# Patient Record
Sex: MALE | Race: WHITE
[De-identification: names, ages, dates, MRNs, and addresses within clinical notes are randomized per-mention and may not be internally consistent; named-entity substitution may affect disease eponyms.]

---

## 2017-03-08 ENCOUNTER — APPOINTMENT (OUTPATIENT)
Dept: OTOLARYNGOLOGY | Facility: CLINIC | Age: 72
End: 2017-03-08

## 2017-03-13 ENCOUNTER — INPATIENT (INPATIENT)
Facility: HOSPITAL | Age: 72
LOS: 6 days | Discharge: HOME | End: 2017-03-20
Attending: INTERNAL MEDICINE | Admitting: INTERNAL MEDICINE

## 2017-04-18 ENCOUNTER — OUTPATIENT (OUTPATIENT)
Dept: OUTPATIENT SERVICES | Facility: HOSPITAL | Age: 72
LOS: 1 days | Discharge: HOME | End: 2017-04-18

## 2017-05-02 ENCOUNTER — APPOINTMENT (OUTPATIENT)
Dept: OTOLARYNGOLOGY | Facility: HOSPITAL | Age: 72
End: 2017-05-02

## 2017-05-02 ENCOUNTER — APPOINTMENT (OUTPATIENT)
Dept: OTOLARYNGOLOGY | Facility: AMBULATORY SURGERY CENTER | Age: 72
End: 2017-05-02

## 2017-05-16 ENCOUNTER — APPOINTMENT (OUTPATIENT)
Dept: OTOLARYNGOLOGY | Facility: HOSPITAL | Age: 72
End: 2017-05-16

## 2017-05-18 ENCOUNTER — APPOINTMENT (OUTPATIENT)
Dept: OTOLARYNGOLOGY | Facility: CLINIC | Age: 72
End: 2017-05-18

## 2017-05-23 ENCOUNTER — APPOINTMENT (OUTPATIENT)
Dept: OTOLARYNGOLOGY | Facility: AMBULATORY SURGERY CENTER | Age: 72
End: 2017-05-23

## 2017-05-24 ENCOUNTER — APPOINTMENT (OUTPATIENT)
Dept: OTOLARYNGOLOGY | Facility: CLINIC | Age: 72
End: 2017-05-24

## 2017-06-10 ENCOUNTER — OUTPATIENT (OUTPATIENT)
Dept: OUTPATIENT SERVICES | Facility: HOSPITAL | Age: 72
LOS: 1 days | Discharge: HOME | End: 2017-06-10

## 2017-06-10 DIAGNOSIS — I95.1 ORTHOSTATIC HYPOTENSION: ICD-10-CM

## 2017-06-10 DIAGNOSIS — G40.909 EPILEPSY, UNSPECIFIED, NOT INTRACTABLE, WITHOUT STATUS EPILEPTICUS: ICD-10-CM

## 2017-06-14 ENCOUNTER — APPOINTMENT (OUTPATIENT)
Dept: OTOLARYNGOLOGY | Facility: CLINIC | Age: 72
End: 2017-06-14

## 2017-06-28 DIAGNOSIS — E06.3 AUTOIMMUNE THYROIDITIS: ICD-10-CM

## 2017-06-28 DIAGNOSIS — E21.0 PRIMARY HYPERPARATHYROIDISM: ICD-10-CM

## 2017-07-13 ENCOUNTER — OUTPATIENT (OUTPATIENT)
Dept: OUTPATIENT SERVICES | Facility: HOSPITAL | Age: 72
LOS: 1 days | Discharge: HOME | End: 2017-07-13

## 2017-07-13 DIAGNOSIS — G40.909 EPILEPSY, UNSPECIFIED, NOT INTRACTABLE, WITHOUT STATUS EPILEPTICUS: ICD-10-CM

## 2017-07-13 DIAGNOSIS — I95.1 ORTHOSTATIC HYPOTENSION: ICD-10-CM

## 2017-07-13 DIAGNOSIS — E03.9 HYPOTHYROIDISM, UNSPECIFIED: ICD-10-CM

## 2017-07-13 DIAGNOSIS — D64.9 ANEMIA, UNSPECIFIED: ICD-10-CM

## 2017-07-13 DIAGNOSIS — E78.9 DISORDER OF LIPOPROTEIN METABOLISM, UNSPECIFIED: ICD-10-CM

## 2017-10-07 ENCOUNTER — OUTPATIENT (OUTPATIENT)
Dept: OUTPATIENT SERVICES | Facility: HOSPITAL | Age: 72
LOS: 1 days | Discharge: HOME | End: 2017-10-07

## 2017-10-07 DIAGNOSIS — Z79.899 OTHER LONG TERM (CURRENT) DRUG THERAPY: ICD-10-CM

## 2017-10-07 DIAGNOSIS — I25.10 ATHEROSCLEROTIC HEART DISEASE OF NATIVE CORONARY ARTERY WITHOUT ANGINA PECTORIS: ICD-10-CM

## 2017-10-07 DIAGNOSIS — D64.9 ANEMIA, UNSPECIFIED: ICD-10-CM

## 2017-10-07 DIAGNOSIS — G40.909 EPILEPSY, UNSPECIFIED, NOT INTRACTABLE, WITHOUT STATUS EPILEPTICUS: ICD-10-CM

## 2017-10-07 DIAGNOSIS — E11.9 TYPE 2 DIABETES MELLITUS WITHOUT COMPLICATIONS: ICD-10-CM

## 2017-10-07 DIAGNOSIS — I95.1 ORTHOSTATIC HYPOTENSION: ICD-10-CM

## 2017-10-07 DIAGNOSIS — E78.00 PURE HYPERCHOLESTEROLEMIA, UNSPECIFIED: ICD-10-CM

## 2017-12-12 DIAGNOSIS — D50.9 IRON DEFICIENCY ANEMIA, UNSPECIFIED: ICD-10-CM

## 2017-12-12 DIAGNOSIS — E66.01 MORBID (SEVERE) OBESITY DUE TO EXCESS CALORIES: ICD-10-CM

## 2017-12-12 DIAGNOSIS — I25.2 OLD MYOCARDIAL INFARCTION: ICD-10-CM

## 2017-12-12 DIAGNOSIS — Z95.5 PRESENCE OF CORONARY ANGIOPLASTY IMPLANT AND GRAFT: ICD-10-CM

## 2017-12-12 DIAGNOSIS — I10 ESSENTIAL (PRIMARY) HYPERTENSION: ICD-10-CM

## 2017-12-12 DIAGNOSIS — N40.0 BENIGN PROSTATIC HYPERPLASIA WITHOUT LOWER URINARY TRACT SYMPTOMS: ICD-10-CM

## 2017-12-12 DIAGNOSIS — G40.909 EPILEPSY, UNSPECIFIED, NOT INTRACTABLE, WITHOUT STATUS EPILEPTICUS: ICD-10-CM

## 2017-12-12 DIAGNOSIS — E11.9 TYPE 2 DIABETES MELLITUS WITHOUT COMPLICATIONS: ICD-10-CM

## 2017-12-12 DIAGNOSIS — Z79.84 LONG TERM (CURRENT) USE OF ORAL HYPOGLYCEMIC DRUGS: ICD-10-CM

## 2017-12-12 DIAGNOSIS — I25.10 ATHEROSCLEROTIC HEART DISEASE OF NATIVE CORONARY ARTERY WITHOUT ANGINA PECTORIS: ICD-10-CM

## 2017-12-12 DIAGNOSIS — G89.29 OTHER CHRONIC PAIN: ICD-10-CM

## 2017-12-12 DIAGNOSIS — K21.9 GASTRO-ESOPHAGEAL REFLUX DISEASE WITHOUT ESOPHAGITIS: ICD-10-CM

## 2017-12-12 DIAGNOSIS — G47.33 OBSTRUCTIVE SLEEP APNEA (ADULT) (PEDIATRIC): ICD-10-CM

## 2017-12-12 DIAGNOSIS — M54.9 DORSALGIA, UNSPECIFIED: ICD-10-CM

## 2017-12-12 DIAGNOSIS — Z86.718 PERSONAL HISTORY OF OTHER VENOUS THROMBOSIS AND EMBOLISM: ICD-10-CM

## 2017-12-12 DIAGNOSIS — E21.3 HYPERPARATHYROIDISM, UNSPECIFIED: ICD-10-CM

## 2017-12-12 DIAGNOSIS — E78.5 HYPERLIPIDEMIA, UNSPECIFIED: ICD-10-CM

## 2017-12-12 DIAGNOSIS — M25.512 PAIN IN LEFT SHOULDER: ICD-10-CM

## 2017-12-12 DIAGNOSIS — E03.9 HYPOTHYROIDISM, UNSPECIFIED: ICD-10-CM

## 2017-12-12 DIAGNOSIS — M25.511 PAIN IN RIGHT SHOULDER: ICD-10-CM

## 2017-12-27 ENCOUNTER — OUTPATIENT (OUTPATIENT)
Dept: OUTPATIENT SERVICES | Facility: HOSPITAL | Age: 72
LOS: 1 days | Discharge: HOME | End: 2017-12-27

## 2017-12-27 DIAGNOSIS — E11.9 TYPE 2 DIABETES MELLITUS WITHOUT COMPLICATIONS: ICD-10-CM

## 2017-12-27 DIAGNOSIS — I10 ESSENTIAL (PRIMARY) HYPERTENSION: ICD-10-CM

## 2017-12-27 DIAGNOSIS — G40.909 EPILEPSY, UNSPECIFIED, NOT INTRACTABLE, WITHOUT STATUS EPILEPTICUS: ICD-10-CM

## 2017-12-27 DIAGNOSIS — E78.5 HYPERLIPIDEMIA, UNSPECIFIED: ICD-10-CM

## 2017-12-27 DIAGNOSIS — I95.1 ORTHOSTATIC HYPOTENSION: ICD-10-CM

## 2018-01-18 ENCOUNTER — OUTPATIENT (OUTPATIENT)
Dept: OUTPATIENT SERVICES | Facility: HOSPITAL | Age: 73
LOS: 1 days | Discharge: HOME | End: 2018-01-18

## 2018-01-18 DIAGNOSIS — I95.1 ORTHOSTATIC HYPOTENSION: ICD-10-CM

## 2018-01-18 DIAGNOSIS — G40.909 EPILEPSY, UNSPECIFIED, NOT INTRACTABLE, WITHOUT STATUS EPILEPTICUS: ICD-10-CM

## 2018-01-23 DIAGNOSIS — H25.89 OTHER AGE-RELATED CATARACT: ICD-10-CM

## 2018-01-23 DIAGNOSIS — E11.9 TYPE 2 DIABETES MELLITUS WITHOUT COMPLICATIONS: ICD-10-CM

## 2018-01-23 DIAGNOSIS — J44.9 CHRONIC OBSTRUCTIVE PULMONARY DISEASE, UNSPECIFIED: ICD-10-CM

## 2018-01-23 DIAGNOSIS — E66.9 OBESITY, UNSPECIFIED: ICD-10-CM

## 2018-01-25 ENCOUNTER — OUTPATIENT (OUTPATIENT)
Dept: OUTPATIENT SERVICES | Facility: HOSPITAL | Age: 73
LOS: 1 days | Discharge: HOME | End: 2018-01-25

## 2018-02-04 DIAGNOSIS — G40.909 EPILEPSY, UNSPECIFIED, NOT INTRACTABLE, WITHOUT STATUS EPILEPTICUS: ICD-10-CM

## 2018-02-04 DIAGNOSIS — I95.1 ORTHOSTATIC HYPOTENSION: ICD-10-CM

## 2018-02-09 DIAGNOSIS — H25.89 OTHER AGE-RELATED CATARACT: ICD-10-CM

## 2018-02-09 DIAGNOSIS — E11.9 TYPE 2 DIABETES MELLITUS WITHOUT COMPLICATIONS: ICD-10-CM

## 2018-02-09 DIAGNOSIS — R56.9 UNSPECIFIED CONVULSIONS: ICD-10-CM

## 2018-02-09 DIAGNOSIS — I25.2 OLD MYOCARDIAL INFARCTION: ICD-10-CM

## 2018-02-09 DIAGNOSIS — I10 ESSENTIAL (PRIMARY) HYPERTENSION: ICD-10-CM

## 2018-02-09 DIAGNOSIS — E78.5 HYPERLIPIDEMIA, UNSPECIFIED: ICD-10-CM

## 2018-02-09 DIAGNOSIS — E66.01 MORBID (SEVERE) OBESITY DUE TO EXCESS CALORIES: ICD-10-CM

## 2018-02-09 DIAGNOSIS — K21.9 GASTRO-ESOPHAGEAL REFLUX DISEASE WITHOUT ESOPHAGITIS: ICD-10-CM

## 2018-02-09 DIAGNOSIS — Z95.5 PRESENCE OF CORONARY ANGIOPLASTY IMPLANT AND GRAFT: ICD-10-CM

## 2018-02-21 DIAGNOSIS — Z01.818 ENCOUNTER FOR OTHER PREPROCEDURAL EXAMINATION: ICD-10-CM

## 2018-02-21 DIAGNOSIS — E21.0 PRIMARY HYPERPARATHYROIDISM: ICD-10-CM

## 2018-03-30 ENCOUNTER — OUTPATIENT (OUTPATIENT)
Dept: OUTPATIENT SERVICES | Facility: HOSPITAL | Age: 73
LOS: 1 days | Discharge: HOME | End: 2018-03-30

## 2018-03-30 DIAGNOSIS — I10 ESSENTIAL (PRIMARY) HYPERTENSION: ICD-10-CM

## 2018-03-30 DIAGNOSIS — E78.5 HYPERLIPIDEMIA, UNSPECIFIED: ICD-10-CM

## 2018-03-30 DIAGNOSIS — E11.9 TYPE 2 DIABETES MELLITUS WITHOUT COMPLICATIONS: ICD-10-CM

## 2018-04-23 ENCOUNTER — EMERGENCY (EMERGENCY)
Facility: HOSPITAL | Age: 73
LOS: 0 days | Discharge: HOME | End: 2018-04-23
Attending: EMERGENCY MEDICINE | Admitting: EMERGENCY MEDICINE

## 2018-04-23 VITALS
SYSTOLIC BLOOD PRESSURE: 154 MMHG | HEIGHT: 71 IN | DIASTOLIC BLOOD PRESSURE: 84 MMHG | OXYGEN SATURATION: 98 % | HEART RATE: 67 BPM | TEMPERATURE: 98 F | RESPIRATION RATE: 18 BRPM | WEIGHT: 279.99 LBS

## 2018-04-23 DIAGNOSIS — I82.432 ACUTE EMBOLISM AND THROMBOSIS OF LEFT POPLITEAL VEIN: ICD-10-CM

## 2018-04-23 DIAGNOSIS — I10 ESSENTIAL (PRIMARY) HYPERTENSION: ICD-10-CM

## 2018-04-23 DIAGNOSIS — E11.9 TYPE 2 DIABETES MELLITUS WITHOUT COMPLICATIONS: ICD-10-CM

## 2018-04-23 DIAGNOSIS — M25.569 PAIN IN UNSPECIFIED KNEE: ICD-10-CM

## 2018-04-23 RX ORDER — RIVAROXABAN 15 MG-20MG
1 KIT ORAL
Qty: 1 | Refills: 0 | OUTPATIENT
Start: 2018-04-23 | End: 2018-05-22

## 2018-04-23 NOTE — ED PROVIDER NOTE - PHYSICAL EXAMINATION
--EXAM--  VITAL SIGNS: I have reviewed vs documented at present.  CONSTITUTIONAL: Well-developed; well-nourished; in no acute distress.   SKIN: Warm and dry, no acute rash.   HEAD: Normocephalic; atraumatic.  EYES: PERRL, EOM intact; conjunctiva and sclera clear. No nystagmus.  ENT: No nasal discharge; airway clear.  NECK: Supple; non tender.CARD: S1, S2, Regular rate and rhythm.   RESP: No wheezes, rales or rhonchi.    EXT: Normal ROM. left knee no tenderness no swelling no eccymosis full rom   NEURO: Alert, oriented, grossly unremarkable. Strength 5/5 in all extremities. Sensation intact throughout. --EXAM--  VITAL SIGNS: I have reviewed vs documented at present.  CONSTITUTIONAL: Well-developed; well-nourished; in no acute distress.   SKIN: Warm and dry, no acute rash.   HEAD: Normocephalic; atraumatic.  EYES: PERRL, EOM intact; conjunctiva and sclera clear. No nystagmus.  ENT: No nasal discharge; airway clear.  NECK: Supple; non tender.CARD: S1, S2, Regular rate and rhythm.   RESP: No wheezes, rales or rhonchi.  EXT: Normal ROM. left knee no tenderness no swelling no eccymosis full rom   NEURO: Alert, oriented, grossly unremarkable. Strength 5/5 in all extremities. Sensation intact throughout.

## 2018-04-23 NOTE — ED PROVIDER NOTE - NS ED ROS FT
Review of Systems:  	•	CONSTITUTIONAL - no fever, no diaphoresis, no chills  	•	SKIN - no rash  	•	HEMATOLOGIC - no bleeding, no bruising    	•	ENT - no change in hearing, no sore throat, no ear pain or tinnitus  	•	RESPIRATORY - no shortness of breath, no cough  	•	CARDIAC - no chest pain, no palpitations  	  	•	MUSCULOSKELETAL -left knee pain , no swelling, no redness  	•	NEUROLOGIC - no weakness, no headache, no paresthesias, no LOC Review of Systems:  	•	CONSTITUTIONAL - no fever, no diaphoresis, no chills  	•	SKIN - no rash  	•	HEMATOLOGIC - no bleeding, no bruising  	•	ENT - no change in hearing, no sore throat, no ear pain or tinnitus  	•	RESPIRATORY - no shortness of breath, no cough  	•	CARDIAC - no chest pain, no palpitations  	•	MUSCULOSKELETAL -left knee pain , no swelling, no redness  	•	NEUROLOGIC - no weakness, no headache, no paresthesias, no LOC

## 2018-04-23 NOTE — ED PROVIDER NOTE - PROGRESS NOTE DETAILS
Pt aware of dvt.  Denies rectal bleeding or other rf.  Pt wishes case d/w his cardiologist Dr. Rizzo prior to anticoagulation. Unable to d/w pts cardiologist.  Pt states he will take xarelto and hold his plavix today.  He states he will call his cardiologist tomorrow to discuss further treatment.  I discussed with patient treatment, need to follow-up as well as reasons to return and they agree.

## 2018-04-23 NOTE — ED PROVIDER NOTE - OBJECTIVE STATEMENT
this is 71 yo male presents to ed for evaluation of knee pain . patient states that pain is going on for past couple of days. patient denies any injury to knee. patient states pain is in the back of his knee. patient states that about 5 years ago he had blood clot in that leg. patient admits that he did have injury at that time. patient states that his feel the same . patient states the only difference is no injury. patient is able to ambulate with no problem

## 2018-04-27 ENCOUNTER — EMERGENCY (EMERGENCY)
Facility: HOSPITAL | Age: 73
LOS: 0 days | Discharge: HOME | End: 2018-04-27
Attending: EMERGENCY MEDICINE | Admitting: EMERGENCY MEDICINE

## 2018-04-27 VITALS
TEMPERATURE: 97 F | OXYGEN SATURATION: 98 % | HEART RATE: 69 BPM | DIASTOLIC BLOOD PRESSURE: 79 MMHG | SYSTOLIC BLOOD PRESSURE: 158 MMHG | RESPIRATION RATE: 18 BRPM

## 2018-04-27 VITALS
DIASTOLIC BLOOD PRESSURE: 97 MMHG | HEIGHT: 71 IN | TEMPERATURE: 97 F | SYSTOLIC BLOOD PRESSURE: 163 MMHG | OXYGEN SATURATION: 100 % | WEIGHT: 184.97 LBS | HEART RATE: 80 BPM | RESPIRATION RATE: 18 BRPM

## 2018-04-27 DIAGNOSIS — Z79.899 OTHER LONG TERM (CURRENT) DRUG THERAPY: ICD-10-CM

## 2018-04-27 DIAGNOSIS — E11.9 TYPE 2 DIABETES MELLITUS WITHOUT COMPLICATIONS: ICD-10-CM

## 2018-04-27 DIAGNOSIS — I82.402 ACUTE EMBOLISM AND THROMBOSIS OF UNSPECIFIED DEEP VEINS OF LEFT LOWER EXTREMITY: ICD-10-CM

## 2018-04-27 DIAGNOSIS — Z79.02 LONG TERM (CURRENT) USE OF ANTITHROMBOTICS/ANTIPLATELETS: ICD-10-CM

## 2018-04-27 DIAGNOSIS — Z79.01 LONG TERM (CURRENT) USE OF ANTICOAGULANTS: ICD-10-CM

## 2018-04-27 DIAGNOSIS — Z79.2 LONG TERM (CURRENT) USE OF ANTIBIOTICS: ICD-10-CM

## 2018-04-27 DIAGNOSIS — Z79.811 LONG TERM (CURRENT) USE OF AROMATASE INHIBITORS: ICD-10-CM

## 2018-04-27 DIAGNOSIS — I10 ESSENTIAL (PRIMARY) HYPERTENSION: ICD-10-CM

## 2018-04-27 LAB
ALBUMIN SERPL ELPH-MCNC: 4.6 G/DL — SIGNIFICANT CHANGE UP (ref 3.5–5.2)
ALP SERPL-CCNC: 57 U/L — SIGNIFICANT CHANGE UP (ref 30–115)
ALT FLD-CCNC: 33 U/L — SIGNIFICANT CHANGE UP (ref 0–41)
ANION GAP SERPL CALC-SCNC: 14 MMOL/L — SIGNIFICANT CHANGE UP (ref 7–14)
AST SERPL-CCNC: 22 U/L — SIGNIFICANT CHANGE UP (ref 0–41)
BILIRUB SERPL-MCNC: 0.3 MG/DL — SIGNIFICANT CHANGE UP (ref 0.2–1.2)
BUN SERPL-MCNC: 20 MG/DL — SIGNIFICANT CHANGE UP (ref 10–20)
CALCIUM SERPL-MCNC: 9.3 MG/DL — SIGNIFICANT CHANGE UP (ref 8.5–10.1)
CHLORIDE SERPL-SCNC: 103 MMOL/L — SIGNIFICANT CHANGE UP (ref 98–110)
CO2 SERPL-SCNC: 24 MMOL/L — SIGNIFICANT CHANGE UP (ref 17–32)
CREAT SERPL-MCNC: 0.8 MG/DL — SIGNIFICANT CHANGE UP (ref 0.7–1.5)
GLUCOSE SERPL-MCNC: 127 MG/DL — HIGH (ref 70–99)
HCT VFR BLD CALC: 38.7 % — LOW (ref 42–52)
HGB BLD-MCNC: 13.3 G/DL — LOW (ref 14–18)
MCHC RBC-ENTMCNC: 30.2 PG — SIGNIFICANT CHANGE UP (ref 27–31)
MCHC RBC-ENTMCNC: 34.4 G/DL — SIGNIFICANT CHANGE UP (ref 32–37)
MCV RBC AUTO: 88 FL — SIGNIFICANT CHANGE UP (ref 80–94)
NRBC # BLD: 0 /100 WBCS — SIGNIFICANT CHANGE UP (ref 0–0)
PLATELET # BLD AUTO: 131 K/UL — SIGNIFICANT CHANGE UP (ref 130–400)
POTASSIUM SERPL-MCNC: 4.2 MMOL/L — SIGNIFICANT CHANGE UP (ref 3.5–5)
POTASSIUM SERPL-SCNC: 4.2 MMOL/L — SIGNIFICANT CHANGE UP (ref 3.5–5)
PROT SERPL-MCNC: 6.7 G/DL — SIGNIFICANT CHANGE UP (ref 6–8)
RBC # BLD: 4.4 M/UL — LOW (ref 4.7–6.1)
RBC # FLD: 13.2 % — SIGNIFICANT CHANGE UP (ref 11.5–14.5)
SODIUM SERPL-SCNC: 141 MMOL/L — SIGNIFICANT CHANGE UP (ref 135–146)
WBC # BLD: 5.01 K/UL — SIGNIFICANT CHANGE UP (ref 4.8–10.8)
WBC # FLD AUTO: 5.01 K/UL — SIGNIFICANT CHANGE UP (ref 4.8–10.8)

## 2018-04-27 NOTE — ED PROVIDER NOTE - PHYSICAL EXAMINATION
Alert, NAD, WDWN, well-appearing  PERRL, EOMI, normal pupils, no icterus, normal external ENT, pink/moist membranes  Airway intact, Lungs CTAB, no wheezing or rhonchi, normal resp effort w/o tachypnea, no retractions  CVS1S2, RRR, no m/g/r, 2+ pulses b/l, warm/well-perfused  Abdomen soft, nondistended, no tenderness on palpation to all 4 quadrants  FROM all 4 ext, no bony tenderness or obvious deformities, no LE edema, left leg tenderness  Skin warm/dry, no acute rash

## 2018-04-27 NOTE — ED PROVIDER NOTE - ATTENDING CONTRIBUTION TO CARE
I personally evaluated the patient. I reviewed the Resident’s or Physician Assistant’s note (as assigned above), and agree with the findings and plan except as documented in my note.  Pt instructed in proper dosing/frequency

## 2018-04-27 NOTE — ED PROVIDER NOTE - NS ED ROS FT
Review of Systems:  	•	CONSTITUTIONAL - no fever, no diaphoresis, no chills  	•	SKIN - no rash  	•	RESPIRATORY - no shortness of breath, no cough  	•	CARDIAC - no chest pain, no palpitations  	•	GI - no abd pain, no nausea, no vomiting, no diarrhea  	•	MUSCULOSKELETAL - +left leg pain

## 2018-04-27 NOTE — ED PROVIDER NOTE - OBJECTIVE STATEMENT
72 yr old male, presenting with concern for taking his Xarelto, was just prescribed 4 days ago for newly discovered left popliteal DVT, started taking it 2 days ago, states he got discharge instructions to take 15 mg PO daily for first 3 weeks, however read package insert only which stated for him to take 15 mg PO BID for 3 weeks and has been doing so for the last 2 days, denies any active bleeding or bruising since taking medications. Has appointment on May 12 for vascular surg

## 2018-05-11 ENCOUNTER — APPOINTMENT (OUTPATIENT)
Dept: VASCULAR SURGERY | Facility: CLINIC | Age: 73
End: 2018-05-11
Payer: MEDICARE

## 2018-05-11 VITALS
BODY MASS INDEX: 39.2 KG/M2 | HEIGHT: 71 IN | DIASTOLIC BLOOD PRESSURE: 90 MMHG | WEIGHT: 280 LBS | SYSTOLIC BLOOD PRESSURE: 142 MMHG

## 2018-05-11 DIAGNOSIS — Z78.9 OTHER SPECIFIED HEALTH STATUS: ICD-10-CM

## 2018-05-11 DIAGNOSIS — K22.70 BARRETT'S ESOPHAGUS W/OUT DYSPLASIA: ICD-10-CM

## 2018-05-11 DIAGNOSIS — E03.9 HYPOTHYROIDISM, UNSPECIFIED: ICD-10-CM

## 2018-05-11 PROCEDURE — 99203 OFFICE O/P NEW LOW 30 MIN: CPT

## 2018-05-11 PROCEDURE — 93971 EXTREMITY STUDY: CPT

## 2018-06-29 ENCOUNTER — APPOINTMENT (OUTPATIENT)
Dept: VASCULAR SURGERY | Facility: CLINIC | Age: 73
End: 2018-06-29
Payer: MEDICARE

## 2018-06-29 PROCEDURE — 99213 OFFICE O/P EST LOW 20 MIN: CPT

## 2018-06-29 PROCEDURE — 93971 EXTREMITY STUDY: CPT

## 2018-07-07 ENCOUNTER — OUTPATIENT (OUTPATIENT)
Dept: OUTPATIENT SERVICES | Facility: HOSPITAL | Age: 73
LOS: 1 days | Discharge: HOME | End: 2018-07-07

## 2018-07-07 DIAGNOSIS — N19 UNSPECIFIED KIDNEY FAILURE: ICD-10-CM

## 2018-07-07 DIAGNOSIS — E11.9 TYPE 2 DIABETES MELLITUS WITHOUT COMPLICATIONS: ICD-10-CM

## 2018-07-07 DIAGNOSIS — E03.9 HYPOTHYROIDISM, UNSPECIFIED: ICD-10-CM

## 2018-07-07 DIAGNOSIS — E78.2 MIXED HYPERLIPIDEMIA: ICD-10-CM

## 2018-07-31 PROBLEM — I10 ESSENTIAL (PRIMARY) HYPERTENSION: Chronic | Status: ACTIVE | Noted: 2018-04-23

## 2018-08-14 ENCOUNTER — OUTPATIENT (OUTPATIENT)
Dept: OUTPATIENT SERVICES | Facility: HOSPITAL | Age: 73
LOS: 1 days | Discharge: HOME | End: 2018-08-14

## 2018-08-14 VITALS
HEIGHT: 71 IN | WEIGHT: 270.07 LBS | DIASTOLIC BLOOD PRESSURE: 94 MMHG | OXYGEN SATURATION: 96 % | RESPIRATION RATE: 16 BRPM | HEART RATE: 76 BPM | SYSTOLIC BLOOD PRESSURE: 158 MMHG | TEMPERATURE: 98 F

## 2018-08-14 DIAGNOSIS — Z01.818 ENCOUNTER FOR OTHER PREPROCEDURAL EXAMINATION: ICD-10-CM

## 2018-08-14 DIAGNOSIS — Z98.890 OTHER SPECIFIED POSTPROCEDURAL STATES: Chronic | ICD-10-CM

## 2018-08-14 DIAGNOSIS — M75.122 COMPLETE ROTATOR CUFF TEAR OR RUPTURE OF LEFT SHOULDER, NOT SPECIFIED AS TRAUMATIC: ICD-10-CM

## 2018-08-14 RX ORDER — LOSARTAN POTASSIUM 100 MG/1
0 TABLET, FILM COATED ORAL
Qty: 0 | Refills: 0 | COMMUNITY

## 2018-08-14 RX ORDER — OMEPRAZOLE 10 MG/1
0 CAPSULE, DELAYED RELEASE ORAL
Qty: 0 | Refills: 0 | COMMUNITY

## 2018-08-14 NOTE — H&P PST ADULT - HISTORY OF PRESENT ILLNESS
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 Denies c/o CP ,PALP, SOB, URI , FEVER, RASH OR UTI SYMPTOMS. Exercise anjana 2 FOS

## 2018-08-27 NOTE — ASU PATIENT PROFILE, ADULT - PMH
CAD (coronary artery disease)  STENTS 2000 AND 2002  Cardiac abnormality    Diabetes    DVT (deep venous thrombosis)  LEFT LE- 2008 AND 2018 AFTER INJURY  GERD (gastroesophageal reflux disease)    Hypertension complications    ETELVINA (obstructive sleep apnea)  NOT USING cpap

## 2018-08-28 ENCOUNTER — OUTPATIENT (OUTPATIENT)
Dept: OUTPATIENT SERVICES | Facility: HOSPITAL | Age: 73
LOS: 1 days | Discharge: HOME | End: 2018-08-28

## 2018-08-28 ENCOUNTER — RESULT REVIEW (OUTPATIENT)
Age: 73
End: 2018-08-28

## 2018-08-28 VITALS
OXYGEN SATURATION: 95 % | SYSTOLIC BLOOD PRESSURE: 140 MMHG | DIASTOLIC BLOOD PRESSURE: 69 MMHG | RESPIRATION RATE: 19 BRPM | HEART RATE: 80 BPM

## 2018-08-28 VITALS
WEIGHT: 270.07 LBS | HEIGHT: 71 IN | OXYGEN SATURATION: 97 % | TEMPERATURE: 99 F | SYSTOLIC BLOOD PRESSURE: 187 MMHG | HEART RATE: 77 BPM | RESPIRATION RATE: 18 BRPM | DIASTOLIC BLOOD PRESSURE: 89 MMHG

## 2018-08-28 DIAGNOSIS — Z98.890 OTHER SPECIFIED POSTPROCEDURAL STATES: Chronic | ICD-10-CM

## 2018-08-28 RX ORDER — SODIUM CHLORIDE 9 MG/ML
1000 INJECTION, SOLUTION INTRAVENOUS
Qty: 0 | Refills: 0 | Status: DISCONTINUED | OUTPATIENT
Start: 2018-08-28 | End: 2018-09-12

## 2018-08-28 RX ORDER — ONDANSETRON 8 MG/1
4 TABLET, FILM COATED ORAL ONCE
Qty: 0 | Refills: 0 | Status: DISCONTINUED | OUTPATIENT
Start: 2018-08-28 | End: 2018-09-12

## 2018-08-28 RX ORDER — OXYCODONE AND ACETAMINOPHEN 5; 325 MG/1; MG/1
1 TABLET ORAL ONCE
Qty: 0 | Refills: 0 | Status: DISCONTINUED | OUTPATIENT
Start: 2018-08-28 | End: 2018-08-28

## 2018-08-28 RX ORDER — HYDROMORPHONE HYDROCHLORIDE 2 MG/ML
0.5 INJECTION INTRAMUSCULAR; INTRAVENOUS; SUBCUTANEOUS
Qty: 0 | Refills: 0 | Status: DISCONTINUED | OUTPATIENT
Start: 2018-08-28 | End: 2018-08-28

## 2018-08-28 RX ADMIN — OXYCODONE AND ACETAMINOPHEN 1 TABLET(S): 5; 325 TABLET ORAL at 13:35

## 2018-08-28 NOTE — BRIEF OPERATIVE NOTE - PROCEDURE
<<-----Click on this checkbox to enter Procedure Decompression, subacromial space, shoulder, arthroscopic  08/28/2018    Active  YOHANA  Rotator cuff repair  08/28/2018    Active  YOHANA

## 2018-08-29 LAB
GLUCOSE BLDC GLUCOMTR-MCNC: 105 MG/DL — HIGH (ref 70–99)
SURGICAL PATHOLOGY STUDY: SIGNIFICANT CHANGE UP

## 2018-09-03 DIAGNOSIS — S46.012A STRAIN OF MUSCLE(S) AND TENDON(S) OF THE ROTATOR CUFF OF LEFT SHOULDER, INITIAL ENCOUNTER: ICD-10-CM

## 2018-09-03 DIAGNOSIS — I25.10 ATHEROSCLEROTIC HEART DISEASE OF NATIVE CORONARY ARTERY WITHOUT ANGINA PECTORIS: ICD-10-CM

## 2018-09-03 DIAGNOSIS — I10 ESSENTIAL (PRIMARY) HYPERTENSION: ICD-10-CM

## 2018-09-03 DIAGNOSIS — K21.9 GASTRO-ESOPHAGEAL REFLUX DISEASE WITHOUT ESOPHAGITIS: ICD-10-CM

## 2018-09-03 DIAGNOSIS — S43.432A SUPERIOR GLENOID LABRUM LESION OF LEFT SHOULDER, INITIAL ENCOUNTER: ICD-10-CM
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
## 2018-09-03 DIAGNOSIS — Z79.01 LONG TERM (CURRENT) USE OF ANTICOAGULANTS: ICD-10-CM
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
## 2018-09-03 DIAGNOSIS — Z86.718 PERSONAL HISTORY OF OTHER VENOUS THROMBOSIS AND EMBOLISM: ICD-10-CM

## 2018-09-03 DIAGNOSIS — M75.42 IMPINGEMENT SYNDROME OF LEFT SHOULDER: ICD-10-CM

## 2018-09-03 DIAGNOSIS — Z95.5 PRESENCE OF CORONARY ANGIOPLASTY IMPLANT AND GRAFT: ICD-10-CM

## 2018-09-03 DIAGNOSIS — E11.9 TYPE 2 DIABETES MELLITUS WITHOUT COMPLICATIONS: ICD-10-CM

## 2018-09-03 DIAGNOSIS — Z88.1 ALLERGY STATUS TO OTHER ANTIBIOTIC AGENTS STATUS: ICD-10-CM

## 2018-09-03 DIAGNOSIS — Z79.02 LONG TERM (CURRENT) USE OF ANTITHROMBOTICS/ANTIPLATELETS: ICD-10-CM

## 2018-09-03 DIAGNOSIS — Z79.84 LONG TERM (CURRENT) USE OF ORAL HYPOGLYCEMIC DRUGS: ICD-10-CM

## 2018-09-03 DIAGNOSIS — M65.9 SYNOVITIS AND TENOSYNOVITIS, UNSPECIFIED: ICD-10-CM

## 2018-09-03 DIAGNOSIS — G47.33 OBSTRUCTIVE SLEEP APNEA (ADULT) (PEDIATRIC): ICD-10-CM

## 2018-09-28 NOTE — ASU PREOP CHECKLIST - TO WHOM
2 RN skin assessment. Redness/blanching to posterior head, B/L elbows, B/L heels, and sacrum. Mepilex applied to posterior head and sacrum. Heels and elbows floated on pillows. Bruising to face. Redness to abdominal and breast folds-interdry and moisture barrier cream applied.    Rayray Batista RN OR

## 2018-10-16 ENCOUNTER — OUTPATIENT (OUTPATIENT)
Dept: OUTPATIENT SERVICES | Facility: HOSPITAL | Age: 73
LOS: 1 days | Discharge: HOME | End: 2018-10-16

## 2018-10-16 DIAGNOSIS — E78.00 PURE HYPERCHOLESTEROLEMIA, UNSPECIFIED: ICD-10-CM

## 2018-10-16 DIAGNOSIS — N17.9 ACUTE KIDNEY FAILURE, UNSPECIFIED: ICD-10-CM

## 2018-10-16 DIAGNOSIS — Z98.890 OTHER SPECIFIED POSTPROCEDURAL STATES: Chronic | ICD-10-CM

## 2018-10-16 DIAGNOSIS — Z79.899 OTHER LONG TERM (CURRENT) DRUG THERAPY: ICD-10-CM

## 2018-10-16 DIAGNOSIS — I25.10 ATHEROSCLEROTIC HEART DISEASE OF NATIVE CORONARY ARTERY WITHOUT ANGINA PECTORIS: ICD-10-CM

## 2018-10-16 PROBLEM — K21.9 GASTRO-ESOPHAGEAL REFLUX DISEASE WITHOUT ESOPHAGITIS: Chronic | Status: ACTIVE | Noted: 2018-08-14

## 2018-10-16 PROBLEM — G47.33 OBSTRUCTIVE SLEEP APNEA (ADULT) (PEDIATRIC): Chronic | Status: ACTIVE | Noted: 2018-08-14

## 2018-10-16 PROBLEM — I82.409 ACUTE EMBOLISM AND THROMBOSIS OF UNSPECIFIED DEEP VEINS OF UNSPECIFIED LOWER EXTREMITY: Chronic | Status: ACTIVE | Noted: 2018-08-14

## 2018-10-23 ENCOUNTER — APPOINTMENT (OUTPATIENT)
Dept: VASCULAR SURGERY | Facility: CLINIC | Age: 73
End: 2018-10-23
Payer: MEDICARE

## 2018-10-23 DIAGNOSIS — Z86.718 PERSONAL HISTORY OF OTHER VENOUS THROMBOSIS AND EMBOLISM: ICD-10-CM

## 2018-10-23 DIAGNOSIS — M79.89 OTHER SPECIFIED SOFT TISSUE DISORDERS: ICD-10-CM

## 2018-10-23 PROCEDURE — 99213 OFFICE O/P EST LOW 20 MIN: CPT

## 2018-10-23 PROCEDURE — 93971 EXTREMITY STUDY: CPT

## 2018-10-26 ENCOUNTER — RX RENEWAL (OUTPATIENT)
Age: 73
End: 2018-10-26

## 2018-11-05 ENCOUNTER — OUTPATIENT (OUTPATIENT)
Dept: OUTPATIENT SERVICES | Facility: HOSPITAL | Age: 73
LOS: 1 days | Discharge: HOME | End: 2018-11-05

## 2018-11-05 DIAGNOSIS — Z98.890 OTHER SPECIFIED POSTPROCEDURAL STATES: Chronic | ICD-10-CM

## 2018-11-05 DIAGNOSIS — Z01.818 ENCOUNTER FOR OTHER PREPROCEDURAL EXAMINATION: ICD-10-CM

## 2019-02-04 ENCOUNTER — OUTPATIENT (OUTPATIENT)
Dept: OUTPATIENT SERVICES | Facility: HOSPITAL | Age: 74
LOS: 1 days | Discharge: HOME | End: 2019-02-04

## 2019-02-04 DIAGNOSIS — Z98.890 OTHER SPECIFIED POSTPROCEDURAL STATES: Chronic | ICD-10-CM

## 2019-02-04 DIAGNOSIS — E11.9 TYPE 2 DIABETES MELLITUS WITHOUT COMPLICATIONS: ICD-10-CM

## 2019-02-04 DIAGNOSIS — E03.9 HYPOTHYROIDISM, UNSPECIFIED: ICD-10-CM

## 2019-02-04 DIAGNOSIS — E21.3 HYPERPARATHYROIDISM, UNSPECIFIED: ICD-10-CM

## 2019-02-04 DIAGNOSIS — I10 ESSENTIAL (PRIMARY) HYPERTENSION: ICD-10-CM

## 2019-02-04 DIAGNOSIS — D64.9 ANEMIA, UNSPECIFIED: ICD-10-CM

## 2019-02-07 ENCOUNTER — OUTPATIENT (OUTPATIENT)
Dept: OUTPATIENT SERVICES | Facility: HOSPITAL | Age: 74
LOS: 1 days | Discharge: HOME | End: 2019-02-07

## 2019-02-07 DIAGNOSIS — Z98.890 OTHER SPECIFIED POSTPROCEDURAL STATES: Chronic | ICD-10-CM

## 2019-02-07 DIAGNOSIS — R80.9 PROTEINURIA, UNSPECIFIED: ICD-10-CM

## 2019-02-07 DIAGNOSIS — E11.9 TYPE 2 DIABETES MELLITUS WITHOUT COMPLICATIONS: ICD-10-CM

## 2019-02-07 DIAGNOSIS — I10 ESSENTIAL (PRIMARY) HYPERTENSION: ICD-10-CM

## 2019-02-09 ENCOUNTER — OUTPATIENT (OUTPATIENT)
Dept: OUTPATIENT SERVICES | Facility: HOSPITAL | Age: 74
LOS: 1 days | Discharge: HOME | End: 2019-02-09

## 2019-02-09 DIAGNOSIS — Z98.890 OTHER SPECIFIED POSTPROCEDURAL STATES: Chronic | ICD-10-CM

## 2019-02-09 DIAGNOSIS — I10 ESSENTIAL (PRIMARY) HYPERTENSION: ICD-10-CM

## 2019-02-17 ENCOUNTER — EMERGENCY (EMERGENCY)
Facility: HOSPITAL | Age: 74
LOS: 0 days | Discharge: HOME | End: 2019-02-17
Attending: EMERGENCY MEDICINE | Admitting: EMERGENCY MEDICINE

## 2019-02-17 VITALS
HEIGHT: 71 IN | RESPIRATION RATE: 17 BRPM | OXYGEN SATURATION: 99 % | WEIGHT: 272.05 LBS | TEMPERATURE: 96 F | DIASTOLIC BLOOD PRESSURE: 95 MMHG | HEART RATE: 82 BPM | SYSTOLIC BLOOD PRESSURE: 172 MMHG

## 2019-02-17 VITALS
OXYGEN SATURATION: 98 % | DIASTOLIC BLOOD PRESSURE: 88 MMHG | HEART RATE: 78 BPM | SYSTOLIC BLOOD PRESSURE: 165 MMHG | RESPIRATION RATE: 18 BRPM

## 2019-02-17 DIAGNOSIS — Z86.718 PERSONAL HISTORY OF OTHER VENOUS THROMBOSIS AND EMBOLISM: ICD-10-CM

## 2019-02-17 DIAGNOSIS — Z79.84 LONG TERM (CURRENT) USE OF ORAL HYPOGLYCEMIC DRUGS: ICD-10-CM

## 2019-02-17 DIAGNOSIS — M54.9 DORSALGIA, UNSPECIFIED: ICD-10-CM

## 2019-02-17 DIAGNOSIS — I25.2 OLD MYOCARDIAL INFARCTION: ICD-10-CM

## 2019-02-17 DIAGNOSIS — Z98.890 OTHER SPECIFIED POSTPROCEDURAL STATES: ICD-10-CM

## 2019-02-17 DIAGNOSIS — Z95.5 PRESENCE OF CORONARY ANGIOPLASTY IMPLANT AND GRAFT: ICD-10-CM

## 2019-02-17 DIAGNOSIS — M54.2 CERVICALGIA: ICD-10-CM

## 2019-02-17 DIAGNOSIS — E11.9 TYPE 2 DIABETES MELLITUS WITHOUT COMPLICATIONS: ICD-10-CM

## 2019-02-17 DIAGNOSIS — E03.9 HYPOTHYROIDISM, UNSPECIFIED: ICD-10-CM

## 2019-02-17 DIAGNOSIS — E78.00 PURE HYPERCHOLESTEROLEMIA, UNSPECIFIED: ICD-10-CM

## 2019-02-17 DIAGNOSIS — I25.10 ATHEROSCLEROTIC HEART DISEASE OF NATIVE CORONARY ARTERY WITHOUT ANGINA PECTORIS: ICD-10-CM

## 2019-02-17 DIAGNOSIS — K21.9 GASTRO-ESOPHAGEAL REFLUX DISEASE WITHOUT ESOPHAGITIS: ICD-10-CM

## 2019-02-17 DIAGNOSIS — Z98.890 OTHER SPECIFIED POSTPROCEDURAL STATES: Chronic | ICD-10-CM

## 2019-02-17 DIAGNOSIS — I10 ESSENTIAL (PRIMARY) HYPERTENSION: ICD-10-CM

## 2019-02-17 LAB
ALBUMIN SERPL ELPH-MCNC: 4.7 G/DL — SIGNIFICANT CHANGE UP (ref 3.5–5.2)
ALP SERPL-CCNC: 63 U/L — SIGNIFICANT CHANGE UP (ref 30–115)
ALT FLD-CCNC: 31 U/L — SIGNIFICANT CHANGE UP (ref 0–41)
ANION GAP SERPL CALC-SCNC: 13 MMOL/L — SIGNIFICANT CHANGE UP (ref 7–14)
APTT BLD: 29.3 SEC — SIGNIFICANT CHANGE UP (ref 27–39.2)
AST SERPL-CCNC: 34 U/L — SIGNIFICANT CHANGE UP (ref 0–41)
BILIRUB SERPL-MCNC: 0.4 MG/DL — SIGNIFICANT CHANGE UP (ref 0.2–1.2)
BUN SERPL-MCNC: 14 MG/DL — SIGNIFICANT CHANGE UP (ref 10–20)
CALCIUM SERPL-MCNC: 9.8 MG/DL — SIGNIFICANT CHANGE UP (ref 8.5–10.1)
CHLORIDE SERPL-SCNC: 100 MMOL/L — SIGNIFICANT CHANGE UP (ref 98–110)
CO2 SERPL-SCNC: 27 MMOL/L — SIGNIFICANT CHANGE UP (ref 17–32)
CREAT SERPL-MCNC: 0.9 MG/DL — SIGNIFICANT CHANGE UP (ref 0.7–1.5)
GLUCOSE SERPL-MCNC: 110 MG/DL — HIGH (ref 70–99)
HCT VFR BLD CALC: 44 % — SIGNIFICANT CHANGE UP (ref 42–52)
HGB BLD-MCNC: 14.8 G/DL — SIGNIFICANT CHANGE UP (ref 14–18)
INR BLD: 1.07 RATIO — SIGNIFICANT CHANGE UP (ref 0.65–1.3)
MAGNESIUM SERPL-MCNC: 1.5 MG/DL — LOW (ref 1.8–2.4)
MCHC RBC-ENTMCNC: 29.5 PG — SIGNIFICANT CHANGE UP (ref 27–31)
MCHC RBC-ENTMCNC: 33.6 G/DL — SIGNIFICANT CHANGE UP (ref 32–37)
MCV RBC AUTO: 87.6 FL — SIGNIFICANT CHANGE UP (ref 80–94)
NRBC # BLD: 0 /100 WBCS — SIGNIFICANT CHANGE UP (ref 0–0)
PLATELET # BLD AUTO: 149 K/UL — SIGNIFICANT CHANGE UP (ref 130–400)
POTASSIUM SERPL-MCNC: 5.5 MMOL/L — HIGH (ref 3.5–5)
POTASSIUM SERPL-SCNC: 5.5 MMOL/L — HIGH (ref 3.5–5)
PROT SERPL-MCNC: 7.5 G/DL — SIGNIFICANT CHANGE UP (ref 6–8)
PROTHROM AB SERPL-ACNC: 12.3 SEC — SIGNIFICANT CHANGE UP (ref 9.95–12.87)
RBC # BLD: 5.02 M/UL — SIGNIFICANT CHANGE UP (ref 4.7–6.1)
RBC # FLD: 13.2 % — SIGNIFICANT CHANGE UP (ref 11.5–14.5)
SODIUM SERPL-SCNC: 140 MMOL/L — SIGNIFICANT CHANGE UP (ref 135–146)
TROPONIN T SERPL-MCNC: <0.01 NG/ML — SIGNIFICANT CHANGE UP
TROPONIN T SERPL-MCNC: <0.01 NG/ML — SIGNIFICANT CHANGE UP
WBC # BLD: 4.99 K/UL — SIGNIFICANT CHANGE UP (ref 4.8–10.8)
WBC # FLD AUTO: 4.99 K/UL — SIGNIFICANT CHANGE UP (ref 4.8–10.8)

## 2019-02-17 NOTE — ED ADULT NURSE NOTE - PMH
CAD (coronary artery disease)  STENTS 2000 AND 2002  Cardiac abnormality    Diabetes    DVT (deep venous thrombosis)  LEFT LE- 2008 AND 2018 AFTER INJURY  GERD (gastroesophageal reflux disease)    Hypertension complications    ETELVINA (obstructive sleep apnea)  NOT USING cpap CAD (coronary artery disease)  STENTS 2000 AND 2002  Cardiac abnormality    Diabetes    DVT (deep venous thrombosis)  LEFT LE- 2008 AND 2018 AFTER INJURY  GERD (gastroesophageal reflux disease)    High cholesterol    Hypertension complications    Hypothyroid    ETELVINA (obstructive sleep apnea)  NOT USING cpap  Pain    Seizure

## 2019-02-17 NOTE — ED PROVIDER NOTE - NSFOLLOWUPINSTRUCTIONS_ED_ALL_ED_FT
Strain    A strain is a stretch or tear in one of the muscles in your body. This is caused by an injury to the area such as a twisting mechanism. Symptoms include pain, swelling, or bruising. Rest that area over the next several days and slowly resume activity when tolerated. Ice can help with swelling and pain.     SEEK IMMEDIATE MEDICAL CARE IF YOU HAVE ANY OF THE FOLLOWING SYMPTOMS: worsening pain, inability to move that body part, numbness or tingling.    Chest Pain    Chest pain can be caused by many different conditions which may or may not be dangerous. Causes include heartburn, lung infections, heart attack, blood clot in lungs, skin infections, strain or damage to muscle, cartilage, or bones, etc. In addition to a history and physical examination, an electrocardiogram (ECG) or other lab tests may have been performed to determine the cause of your chest pain. Follow up with your primary care provider or with a cardiologist as instructed.     SEEK IMMEDIATE MEDICAL CARE IF YOU HAVE ANY OF THE FOLLOWING SYMPTOMS: worsening chest pain, coughing up blood, unexplained back/neck/jaw pain, severe abdominal pain, dizziness or lightheadedness, fainting, shortness of breath, sweaty or clammy skin, vomiting, or racing heart beat. These symptoms may represent a serious problem that is an emergency. Do not wait to see if the symptoms will go away. Get medical help right away. Call 911 and do not drive yourself to the hospital.    Follow up w/ Dr. Snider cardiology in 1-2 days Strain    A strain is a stretch or tear in one of the muscles in your body. This is caused by an injury to the area such as a twisting mechanism. Symptoms include pain, swelling, or bruising. Rest that area over the next several days and slowly resume activity when tolerated. Ice can help with swelling and pain.     SEEK IMMEDIATE MEDICAL CARE IF YOU HAVE ANY OF THE FOLLOWING SYMPTOMS: worsening pain, inability to move that body part, numbness or tingling.    Chest Pain    Chest pain can be caused by many different conditions which may or may not be dangerous. Causes include heartburn, lung infections, heart attack, blood clot in lungs, skin infections, strain or damage to muscle, cartilage, or bones, etc. In addition to a history and physical examination, an electrocardiogram (ECG) or other lab tests may have been performed to determine the cause of your chest pain. Follow up with your primary care provider or with a cardiologist as instructed.     SEEK IMMEDIATE MEDICAL CARE IF YOU HAVE ANY OF THE FOLLOWING SYMPTOMS: worsening chest pain, coughing up blood, unexplained back/neck/jaw pain, severe abdominal pain, dizziness or lightheadedness, fainting, shortness of breath, sweaty or clammy skin, vomiting, or racing heart beat. These symptoms may represent a serious problem that is an emergency. Do not wait to see if the symptoms will go away. Get medical help right away. Call 911 and do not drive yourself to the hospital.    Follow up w/ Dr. Rizzo cardiology in 1-2 days

## 2019-02-17 NOTE — ED PROVIDER NOTE - NS ED ROS FT
Constitutional: See HPI.  Eyes: No visual changes, eye pain or discharge.   ENMT: No hearing changes, pain, discharge or infections.  Cardiac: No SOB or edema. No chest pain with exertion.  Respiratory: No cough or respiratory distress  GI: No nausea, vomiting, diarrhea or abdominal pain.  : No dysuria, frequency or burning. No Discharge  MS: + neck pain. No myalgia, muscle weakness, joint pain or back pain.  Neuro: No headache or weakness  Skin: No skin rash.  Except as documented in the HPI, all other systems are negative.

## 2019-02-17 NOTE — ED PROVIDER NOTE - ATTENDING CONTRIBUTION TO CARE
73y male above PMH had 10 minute episode of sharp neck pain radiating to trapezii while looking down at bible in [employer], concerned as prior MI had presented with upper back pain (today's episode on neck), pt asymptomatic, on exam vital signs appreciated, head nc/at, perrla, EOMI, conj pink op clear neck supple no midline ttp cor rrr lungs cta abd snt with ventral diastasis no c/c/e pulses equal calves nontender neuro intact, labs and studies reviewed, will d/c to f/u with cardio. Patient counseled regarding conditions which should prompt return.

## 2019-02-17 NOTE — ED ADULT NURSE NOTE - CHIEF COMPLAINT QUOTE
"I was sitting in [employer]. I suddenly got a sharp pain in between my shoulder blades and it went up to my neck. It resolved then returned again." Pt has history of MI which presented in similar fashion.

## 2019-02-17 NOTE — ED ADULT TRIAGE NOTE - CHIEF COMPLAINT QUOTE
"I was sitting in [employer]. I suddenly got a sharp pain in between my shoulder blades and it went up to my neck. It resolved then returned again." Pt has history of MI which presented in similar fashion.

## 2019-02-17 NOTE — ED PROVIDER NOTE - CARE PROVIDER_API CALL
Jose Raul Rizzo)  Cardiovascular Disease; Internal Medicine; Nuclear Cardiology  [address] [place] [address], [place] [address]  Phone: [phone]  Fax: [phone]  Follow Up Time: 1-3 Days

## 2019-02-17 NOTE — ED PROVIDER NOTE - CLINICAL SUMMARY MEDICAL DECISION MAKING FREE TEXT BOX
73y male above PMH had 10 minute episode of sharp neck pain radiating to trapezii while looking down at bible in [employer], concerned as prior MI had presented with upper back pain (today's episode on neck), pt asymptomatic, on exam vital signs appreciated, head nc/at, perrla, EOMI, conj pink op clear neck supple no midline ttp cor rrr lungs cta abd snt with ventral diastasis no c/c/e pulses equal calves nontender neuro intact, labs and studies reviewed, will d/c to f/u with cardio. Patient counseled regarding conditions which should prompt return.

## 2019-02-17 NOTE — ED PROVIDER NOTE - PHYSICAL EXAMINATION
CONST: Well appearing in NAD  EYES: Sclera and conjunctiva clear.  NECK: Non-tender, no midline C/T/L tenderness, no meningeal signs, supple  CARD: Normal S1 S2; Normal rate and rhythm  RESP: Equal BS B/L, No wheezes, rhonchi or rales. No distress  GI: Soft, non-tender, non-distended.  MS: Normal ROM in all extremities. No edema of lower extremities, no calf pain, pupils equal and symmetric bilaterally   SKIN: Warm, dry, no acute rashes. Good turgor  NEURO: A&Ox3, No focal deficits. Strength 5/5 with no sensory deficits

## 2019-02-17 NOTE — ED PROVIDER NOTE - PROGRESS NOTE DETAILS
2/14/17Impression:  1. IV Adenosine Dual Isotope Study which was negative with respect to symptoms  and EKG changes.  2. Myocardial perfusion imaging reveals evidence of a prior lateral wall  myocardial infarction with minimal terry-infarct ischemia, unchanged since 2011.  3. Gated imaging reveals mild global left ventricular hypokinesis, severe  hypokinesis of the lateral wall. The overall left ventricular systolic function  is mildly to moderately reduced. pt asx at this time.  2 trops negative, unremarkable ekgs.  pt asx at this time. no chest pain today.  Discussed results with pt.  All questions were answered and return precautions discussed.  Pt is asx and comfortable at this time.  Unremarkable re-exam.  No further concerns at this time from pt.  Will follow up with PMD and cardiology.  Pt understands and agrees with tx plan.

## 2019-03-05 PROBLEM — R52 PAIN, UNSPECIFIED: Chronic | Status: ACTIVE | Noted: 2019-02-17

## 2019-03-05 PROBLEM — E03.9 HYPOTHYROIDISM, UNSPECIFIED: Chronic | Status: ACTIVE | Noted: 2019-02-17

## 2019-03-05 PROBLEM — E78.00 PURE HYPERCHOLESTEROLEMIA, UNSPECIFIED: Chronic | Status: ACTIVE | Noted: 2019-02-17

## 2019-03-07 ENCOUNTER — OUTPATIENT (OUTPATIENT)
Dept: OUTPATIENT SERVICES | Facility: HOSPITAL | Age: 74
LOS: 1 days | Discharge: HOME | End: 2019-03-07

## 2019-03-07 VITALS
WEIGHT: 274.48 LBS | DIASTOLIC BLOOD PRESSURE: 81 MMHG | SYSTOLIC BLOOD PRESSURE: 144 MMHG | HEART RATE: 88 BPM | RESPIRATION RATE: 16 BRPM | OXYGEN SATURATION: 96 % | TEMPERATURE: 98 F | HEIGHT: 71 IN

## 2019-03-07 DIAGNOSIS — M75.122 COMPLETE ROTATOR CUFF TEAR OR RUPTURE OF LEFT SHOULDER, NOT SPECIFIED AS TRAUMATIC: ICD-10-CM

## 2019-03-07 DIAGNOSIS — Z98.890 OTHER SPECIFIED POSTPROCEDURAL STATES: Chronic | ICD-10-CM

## 2019-03-07 DIAGNOSIS — Z01.818 ENCOUNTER FOR OTHER PREPROCEDURAL EXAMINATION: ICD-10-CM

## 2019-03-07 DIAGNOSIS — I25.10 ATHEROSCLEROTIC HEART DISEASE OF NATIVE CORONARY ARTERY WITHOUT ANGINA PECTORIS: Chronic | ICD-10-CM

## 2019-03-07 LAB
ALBUMIN SERPL ELPH-MCNC: 4.8 G/DL — SIGNIFICANT CHANGE UP (ref 3.5–5.2)
ALP SERPL-CCNC: 68 U/L — SIGNIFICANT CHANGE UP (ref 30–115)
ALT FLD-CCNC: 24 U/L — SIGNIFICANT CHANGE UP (ref 0–41)
ANION GAP SERPL CALC-SCNC: 13 MMOL/L — SIGNIFICANT CHANGE UP (ref 7–14)
AST SERPL-CCNC: 19 U/L — SIGNIFICANT CHANGE UP (ref 0–41)
BILIRUB SERPL-MCNC: 0.4 MG/DL — SIGNIFICANT CHANGE UP (ref 0.2–1.2)
BUN SERPL-MCNC: 17 MG/DL — SIGNIFICANT CHANGE UP (ref 10–20)
CALCIUM SERPL-MCNC: 9.9 MG/DL — SIGNIFICANT CHANGE UP (ref 8.5–10.1)
CHLORIDE SERPL-SCNC: 102 MMOL/L — SIGNIFICANT CHANGE UP (ref 98–110)
CO2 SERPL-SCNC: 25 MMOL/L — SIGNIFICANT CHANGE UP (ref 17–32)
CREAT SERPL-MCNC: 0.9 MG/DL — SIGNIFICANT CHANGE UP (ref 0.7–1.5)
ESTIMATED AVERAGE GLUCOSE: 126 MG/DL — HIGH (ref 68–114)
GLUCOSE SERPL-MCNC: 131 MG/DL — HIGH (ref 70–99)
HBA1C BLD-MCNC: 6 % — HIGH (ref 4–5.6)
POTASSIUM SERPL-MCNC: 4.4 MMOL/L — SIGNIFICANT CHANGE UP (ref 3.5–5)
POTASSIUM SERPL-SCNC: 4.4 MMOL/L — SIGNIFICANT CHANGE UP (ref 3.5–5)
PROT SERPL-MCNC: 7.3 G/DL — SIGNIFICANT CHANGE UP (ref 6–8)
SODIUM SERPL-SCNC: 140 MMOL/L — SIGNIFICANT CHANGE UP (ref 135–146)

## 2019-03-07 RX ORDER — ESLICARBAZEPINE ACETATE 800 MG/1
1 TABLET ORAL
Qty: 0 | Refills: 0 | COMMUNITY

## 2019-03-07 RX ORDER — METFORMIN HYDROCHLORIDE 850 MG/1
1 TABLET ORAL
Qty: 0 | Refills: 0 | COMMUNITY

## 2019-03-07 NOTE — ED ADULT NURSE NOTE - CHIEF COMPLAINT
PT ACUTE  Treatment Session          Pt seen on 11ST nursing unit. Frequency Comments: M T TH F (OT triaged IN) (CD)    RECOMMENDATIONS FOR DISCHARGE:  Recommendation for Discharge: PT: Sub-acute nursing home (03/07/19 1210)                                                                                                                 Admitting complaint: Gait instability [R26.81]  Left hip pain [M25.552]  Intractable pain [R52]                                     Precautions  Weight Bearing Status Comments: per MD Carol Menjivar note on 3/4, pt may WB L LE (03/04/19 1225)  Other Precautions: fall risk (03/05/19 1130)    SUBJECTIVE:    Subjective: Pt agreeable to participate in some therapy. (03/07/19 1210)  Subjective/Objective Comments: RN Annabella Calle aware of therapy session. Pt comfortable in recliner at end of session, call light in reach. Pt's daughter present in room. (03/07/19 1210)    OBJECTIVE:  Basic Lines: Capped IV (03/07/19 1210)  Safety Measures: Other (comment)(call light in reach) (03/07/19 1210)    RN reported Yessenia Back Fall Scale Score: 70    ASSESSMENT:   Pt displayed fair tolerance to session, activity limited due to pain, fatigue. Pt reported pain of 5/10 in L hip. Pt completed sit to/from stand transfers with supervision for safety, cues for hand placement, cues for anterior weight shift. Pt ambulated 35 feet x 1 with 2ww and supervision for safety, step to pattern, decreased gait speed. Pt displayed decreased functional mobility, activity tolerance, safety, balance, strength. Pt would benefit from continued skilled PT services to address above limitations. EDUCATION:   On this date, the patient was educated on functional transfers, safe mobility with 2ww.     The response to education was: Verbalizes understanding and Needs reinforcement    PT Identified Barriers to Discharge: decreased functional mobility, lives alone, pain     PLAN:   Continue skilled PT, including the following Treatment/Interventions: Functional transfer training;Strengthening; Endurance training;Patient/Family training;Bed mobility;Gait training; Safety Education (03/07/19 1210)   Frequency Comments: M T TH F (OT triaged IN) (CD) (03/07/19 1210)    Treatment Plan for Next Session: increase independence with bed mobility, transfers, ambulation with 4ww          RECOMMENDATIONS FOR DISCHARGE:  Recommendation for Discharge: PT: Sub-acute nursing home (03/07/19 1210)    PT/OT Mobility Equipment for Discharge: pt owns 4ww, cane (03/07/19 1210)  PT/OT ADL Equipment for Discharge: continue to assess (03/04/19 1637)     Assistance needed when returning home:   Not discussed at this time due to discharge plan/needs not established. Will continue to address as hospital stay progresses.        ICU Mobility Assesment (PERME):       Last 24 hours of Functional Data  Bed Mobility   Bed Mobility  Bed Mobility Comments: not assess as pt seated in recliner at start and end of session, declined reviewing bed mobility techniques this date (03/07/19 1210)    Transfers  Transfers  Sit to Stand: Supervision Mauricio Mensah) (03/07/19 1210)  Stand to Sit: Supervision Mauricio Mensah) (03/07/19 1210)  Assistive Device/: 2-wheeled walker (03/07/19 1210)  Transfer Comments 1: supervision for safety, cues for hand placement, cues for anterior weight shift (03/07/19 1210)      Gait  Gait  Gait Assistance: Supervision Mauricio Mensah) (03/07/19 1210)  Assistive Device/: 2-wheeled walker (03/07/19 1210)  Ambulation Distance (Feet): 35 Feet (03/07/19 1210)  Ambulation Surface: Tile (03/07/19 1210)  Gait Comments 1: supervision for safety, step to pattern, decreased gait speed (03/07/19 1210)  Second Trial: No (03/07/19 1210),      Stairs          Neuromuscular Re-education       Balance  Balance  Sitting - Static: Modified Independent (03/07/19 1210)  Sitting - Dynamic: Modified Independent (03/07/19 1210)  Standing - Static: Supervision Svitlana Van) (03/07/19 1210)  Standing - Dynamic (eyes open): Supervision Svitlana Van) (03/07/19 1210)  Balance Comments #1: B UE support on 2ww when standing (03/07/19 1210)    Wheelchair Mobility       Patient's Personal Goal: to return home (03/04/19 1225)    Therapy Goals:    Goals  Short Term Goals to Be Reviewed On: 03/11/19 (03/04/19 1225)  Short Term Goals = Discharge Goals: Yes (03/04/19 1225)  Goal Agreement: Patient agrees with goals and treatment plan (03/04/19 1225)  Bed Mobility Discharge Goal: modified independent with HOB flat, no rail (03/04/19 1225)  Transfer Discharge Goal: modified independent with 4ww (03/04/19 1225)  Ambulation Discharge Goal: >50 feet with 4ww and modified independence (03/04/19 1225)        PT Time Spent: 25 minutes (03/07/19 1210)    See PT flowsheet for full details regarding the PT therapy provided. The patient is a 72y Male complaining of see chief complaint quote.

## 2019-03-07 NOTE — H&P PST ADULT - PSH
CAD (coronary artery disease)  stents  H/O arthroscopy of knee  RT -2 AND LT -1  H/O hernia repair  RT INGUINAL -2 AND LT INGUINAL -1  History of parathyroid surgery

## 2019-03-07 NOTE — H&P PST ADULT - PMH
Back pain    BPH (benign prostatic hyperplasia)    CAD (coronary artery disease)  STENTS 2000 AND 2002  Diabetes    DVT (deep venous thrombosis)  LEFT LE- 2008 AND 2018 AFTER INJURY  Esophagitis    GERD (gastroesophageal reflux disease)    High cholesterol    Hypertension complications    Hypothyroid    ETELVINA (obstructive sleep apnea)  NOT USING cpap  Pain    Seizure  last episode 2 yrs ago

## 2019-03-07 NOTE — H&P PST ADULT - REASON FOR ADMISSION
72 yo male presents s/p injury to right shoulder @ work 7/2014, pt is scheduled for repair;  denies chest pain, palpitations, shortness of breath, dyspnea, or dysuria. exercise tolerance: 2 blocks/ flights of stairs w/o sob

## 2019-03-11 PROBLEM — K20.9 ESOPHAGITIS, UNSPECIFIED: Chronic | Status: ACTIVE | Noted: 2019-03-07

## 2019-03-11 PROBLEM — N40.0 BENIGN PROSTATIC HYPERPLASIA WITHOUT LOWER URINARY TRACT SYMPTOMS: Chronic | Status: ACTIVE | Noted: 2019-03-07

## 2019-03-28 ENCOUNTER — RESULT REVIEW (OUTPATIENT)
Age: 74
End: 2019-03-28

## 2019-03-28 ENCOUNTER — OUTPATIENT (OUTPATIENT)
Dept: OUTPATIENT SERVICES | Facility: HOSPITAL | Age: 74
LOS: 1 days | Discharge: HOME | End: 2019-03-28
Payer: OTHER MISCELLANEOUS

## 2019-03-28 VITALS
SYSTOLIC BLOOD PRESSURE: 173 MMHG | DIASTOLIC BLOOD PRESSURE: 79 MMHG | HEART RATE: 68 BPM | WEIGHT: 274.48 LBS | OXYGEN SATURATION: 98 % | RESPIRATION RATE: 18 BRPM | TEMPERATURE: 99 F | HEIGHT: 71 IN

## 2019-03-28 VITALS
HEART RATE: 91 BPM | RESPIRATION RATE: 19 BRPM | SYSTOLIC BLOOD PRESSURE: 143 MMHG | DIASTOLIC BLOOD PRESSURE: 63 MMHG | OXYGEN SATURATION: 95 %

## 2019-03-28 DIAGNOSIS — Z98.890 OTHER SPECIFIED POSTPROCEDURAL STATES: Chronic | ICD-10-CM

## 2019-03-28 DIAGNOSIS — I25.10 ATHEROSCLEROTIC HEART DISEASE OF NATIVE CORONARY ARTERY WITHOUT ANGINA PECTORIS: Chronic | ICD-10-CM

## 2019-03-28 PROCEDURE — 88304 TISSUE EXAM BY PATHOLOGIST: CPT | Mod: 26

## 2019-03-28 RX ORDER — SODIUM CHLORIDE 9 MG/ML
1000 INJECTION, SOLUTION INTRAVENOUS
Qty: 0 | Refills: 0 | Status: DISCONTINUED | OUTPATIENT
Start: 2019-03-28 | End: 2019-04-12

## 2019-03-28 RX ORDER — OXYCODONE AND ACETAMINOPHEN 5; 325 MG/1; MG/1
1 TABLET ORAL ONCE
Qty: 0 | Refills: 0 | Status: DISCONTINUED | OUTPATIENT
Start: 2019-03-28 | End: 2019-03-28

## 2019-03-28 RX ORDER — ONDANSETRON 8 MG/1
4 TABLET, FILM COATED ORAL ONCE
Qty: 0 | Refills: 0 | Status: DISCONTINUED | OUTPATIENT
Start: 2019-03-28 | End: 2019-04-12

## 2019-03-28 RX ORDER — HYDROMORPHONE HYDROCHLORIDE 2 MG/ML
0.5 INJECTION INTRAMUSCULAR; INTRAVENOUS; SUBCUTANEOUS
Qty: 0 | Refills: 0 | Status: DISCONTINUED | OUTPATIENT
Start: 2019-03-28 | End: 2019-03-28

## 2019-03-28 RX ORDER — GENTAMICIN SULFATE 3 MG/ML
1 SOLUTION/ DROPS OPHTHALMIC EVERY 4 HOURS
Qty: 0 | Refills: 0 | Status: DISCONTINUED | OUTPATIENT
Start: 2019-03-28 | End: 2019-04-12

## 2019-03-28 RX ORDER — GENTAMICIN SULFATE 3 MG/ML
1 SOLUTION/ DROPS OPHTHALMIC EVERY 4 HOURS
Qty: 0 | Refills: 0 | Status: DISCONTINUED | OUTPATIENT
Start: 2019-03-28 | End: 2019-03-28

## 2019-03-28 RX ADMIN — Medication 1 DROP(S): at 13:16

## 2019-03-28 RX ADMIN — SODIUM CHLORIDE 100 MILLILITER(S): 9 INJECTION, SOLUTION INTRAVENOUS at 11:03

## 2019-03-28 RX ADMIN — GENTAMICIN SULFATE 1 DROP(S): 3 SOLUTION/ DROPS OPHTHALMIC at 13:21

## 2019-03-28 NOTE — CHART NOTE - NSCHARTNOTEFT_GEN_A_CORE
PACU ANESTHESIA ADMISSION NOTE      Procedure: Right shoulder arthroscopy, debridement and decompression, right rotator cuff repair  Post op diagnosis:      ____  Intubated  TV:______       Rate: ______      FiO2: ______    _X___  Patent Airway    ____  Full return of protective reflexes    ____  Full recovery from anesthesia / back to baseline status    Vitals:  T: 98.5F  HR: 87  BP: 170/80  RR: 18   SpO2: 98%    Mental Status:  _X___ Awake   _____ Alert   _____ Drowsy   _____ Sedated    Nausea/Vomiting:  __X__ NO  ______Yes,   See Post - Op Orders          Pain Scale (0-10):  _____    Treatment: ____ None    _X___ See Post - Op/PCA Orders    Post - Operative Fluids:   ____ Oral   ___X_ See Post - Op Orders    Plan: Discharge:   X____Home       _____Floor     _____Critical Care    _____  Other:_________________    Comments: pt. tolerated procedure well, transported to PACU, report endorsed to RN

## 2019-03-28 NOTE — ASU DISCHARGE PLAN (ADULT/PEDIATRIC) - ASU DC SPECIAL INSTRUCTIONSFT
Post -Operative Shoulder Arthroscopy Instructions    Anesthesia:  - No alcoholic beverages, including beer and wine, for 24 hours or while on presecribed pain medications  - Do not make any important decisions or sign any legal documents  - Do not drive or operate machinary for 24 hours  - You are required upon discharge to leave the surgical center with a responsible adult who will drive you home    Surgery:  - Keep sling on until being seen in office.  You can move fingers, wrist and elbow while in sling  - Keep clean and dry for 3 days  - Remove dressing in 3 days and cover wounds with band-aids, you can then shower  - Take pain medication as prescribed  - Resume regular diet  - Follow-up in approximately 1 week    FOR QUESTIONS OR CONCERNS, CALL [phone]    Notify your doctor if you develop: fever, chills, excessive swelling, drainage, pain not controlled by pain medication, persistent numbness in hand or fingers     IF AN EMERGENCY ARISES , CALL 911 AND/OR GO TO THE EMERGENCY ROOM    Dr. Baker  [phone]

## 2019-03-28 NOTE — ANESTHESIA FOLLOW-UP NOTE - NSEVALATIONFT_GEN_ALL_CORE
Pt c/o right eye pain not relieved with saline flush administered by nurse.  Corneal abrasion policy to be started.  Instructions to be given to pt and pt's wife.

## 2019-04-02 LAB — SURGICAL PATHOLOGY STUDY: SIGNIFICANT CHANGE UP

## 2019-04-03 DIAGNOSIS — I25.10 ATHEROSCLEROTIC HEART DISEASE OF NATIVE CORONARY ARTERY WITHOUT ANGINA PECTORIS: ICD-10-CM

## 2019-04-03 DIAGNOSIS — Z88.1 ALLERGY STATUS TO OTHER ANTIBIOTIC AGENTS STATUS: ICD-10-CM

## 2019-04-03 DIAGNOSIS — Z79.02 LONG TERM (CURRENT) USE OF ANTITHROMBOTICS/ANTIPLATELETS: ICD-10-CM

## 2019-04-03 DIAGNOSIS — E11.9 TYPE 2 DIABETES MELLITUS WITHOUT COMPLICATIONS: ICD-10-CM

## 2019-04-03 DIAGNOSIS — Z99.81 DEPENDENCE ON SUPPLEMENTAL OXYGEN: ICD-10-CM

## 2019-04-03 DIAGNOSIS — M25.811 OTHER SPECIFIED JOINT DISORDERS, RIGHT SHOULDER: ICD-10-CM

## 2019-04-03 DIAGNOSIS — G47.33 OBSTRUCTIVE SLEEP APNEA (ADULT) (PEDIATRIC): ICD-10-CM

## 2019-04-03 DIAGNOSIS — M75.101 UNSPECIFIED ROTATOR CUFF TEAR OR RUPTURE OF RIGHT SHOULDER, NOT SPECIFIED AS TRAUMATIC: ICD-10-CM

## 2019-04-03 DIAGNOSIS — M65.811 OTHER SYNOVITIS AND TENOSYNOVITIS, RIGHT SHOULDER: ICD-10-CM

## 2019-04-03 DIAGNOSIS — I10 ESSENTIAL (PRIMARY) HYPERTENSION: ICD-10-CM

## 2019-04-03 DIAGNOSIS — M24.111 OTHER ARTICULAR CARTILAGE DISORDERS, RIGHT SHOULDER: ICD-10-CM

## 2019-05-08 ENCOUNTER — OUTPATIENT (OUTPATIENT)
Dept: OUTPATIENT SERVICES | Facility: HOSPITAL | Age: 74
LOS: 1 days | Discharge: HOME | End: 2019-05-08

## 2019-05-08 DIAGNOSIS — E03.9 HYPOTHYROIDISM, UNSPECIFIED: ICD-10-CM

## 2019-05-08 DIAGNOSIS — Z79.899 OTHER LONG TERM (CURRENT) DRUG THERAPY: ICD-10-CM

## 2019-05-08 DIAGNOSIS — Z98.890 OTHER SPECIFIED POSTPROCEDURAL STATES: Chronic | ICD-10-CM

## 2019-05-08 DIAGNOSIS — E11.9 TYPE 2 DIABETES MELLITUS WITHOUT COMPLICATIONS: ICD-10-CM

## 2019-05-08 DIAGNOSIS — E78.00 PURE HYPERCHOLESTEROLEMIA, UNSPECIFIED: ICD-10-CM

## 2019-05-08 DIAGNOSIS — I25.10 ATHEROSCLEROTIC HEART DISEASE OF NATIVE CORONARY ARTERY WITHOUT ANGINA PECTORIS: ICD-10-CM

## 2019-05-08 DIAGNOSIS — I25.10 ATHEROSCLEROTIC HEART DISEASE OF NATIVE CORONARY ARTERY WITHOUT ANGINA PECTORIS: Chronic | ICD-10-CM

## 2019-05-11 ENCOUNTER — EMERGENCY (EMERGENCY)
Facility: HOSPITAL | Age: 74
LOS: 0 days | Discharge: HOME | End: 2019-05-11
Attending: EMERGENCY MEDICINE | Admitting: EMERGENCY MEDICINE
Payer: MEDICARE

## 2019-05-11 VITALS
HEART RATE: 75 BPM | SYSTOLIC BLOOD PRESSURE: 181 MMHG | DIASTOLIC BLOOD PRESSURE: 81 MMHG | RESPIRATION RATE: 18 BRPM | TEMPERATURE: 96 F | HEIGHT: 71 IN | WEIGHT: 272.05 LBS | OXYGEN SATURATION: 98 %

## 2019-05-11 VITALS
HEART RATE: 67 BPM | OXYGEN SATURATION: 96 % | DIASTOLIC BLOOD PRESSURE: 84 MMHG | RESPIRATION RATE: 18 BRPM | SYSTOLIC BLOOD PRESSURE: 153 MMHG

## 2019-05-11 DIAGNOSIS — I10 ESSENTIAL (PRIMARY) HYPERTENSION: ICD-10-CM

## 2019-05-11 DIAGNOSIS — K21.9 GASTRO-ESOPHAGEAL REFLUX DISEASE WITHOUT ESOPHAGITIS: ICD-10-CM

## 2019-05-11 DIAGNOSIS — I25.10 ATHEROSCLEROTIC HEART DISEASE OF NATIVE CORONARY ARTERY WITHOUT ANGINA PECTORIS: Chronic | ICD-10-CM

## 2019-05-11 DIAGNOSIS — E11.9 TYPE 2 DIABETES MELLITUS WITHOUT COMPLICATIONS: ICD-10-CM

## 2019-05-11 DIAGNOSIS — M54.2 CERVICALGIA: ICD-10-CM

## 2019-05-11 DIAGNOSIS — I25.10 ATHEROSCLEROTIC HEART DISEASE OF NATIVE CORONARY ARTERY WITHOUT ANGINA PECTORIS: ICD-10-CM

## 2019-05-11 DIAGNOSIS — E03.9 HYPOTHYROIDISM, UNSPECIFIED: ICD-10-CM
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
## 2019-05-11 DIAGNOSIS — Z98.890 OTHER SPECIFIED POSTPROCEDURAL STATES: Chronic | ICD-10-CM

## 2019-05-11 DIAGNOSIS — N40.0 BENIGN PROSTATIC HYPERPLASIA WITHOUT LOWER URINARY TRACT SYMPTOMS: ICD-10-CM

## 2019-05-11 DIAGNOSIS — E78.00 PURE HYPERCHOLESTEROLEMIA, UNSPECIFIED: ICD-10-CM
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
## 2019-05-11 LAB — TROPONIN T SERPL-MCNC: <0.01 NG/ML — SIGNIFICANT CHANGE UP

## 2019-05-11 PROCEDURE — 99284 EMERGENCY DEPT VISIT MOD MDM: CPT

## 2019-05-11 RX ORDER — ACETAMINOPHEN 500 MG
650 TABLET ORAL ONCE
Refills: 0 | Status: COMPLETED | OUTPATIENT
Start: 2019-05-11 | End: 2019-05-11

## 2019-05-11 RX ADMIN — Medication 650 MILLIGRAM(S): at 08:01

## 2019-05-11 NOTE — ED PROVIDER NOTE - ATTENDING CONTRIBUTION TO CARE
73y male above PMH c/o neck pain, awoke yesterday morning around 0530 feeling well, went downstairs and fell asleep on couch, wife found him with head cocked to side, pt with pain to base of skull and neck upon awakening which worsened throughout day, took ASA without relief, no diaphoresis, no CP, no exertional component, finally sought care today as his MI presented with neck pain (but also n/v/diaphoresis), pt seen by myself 3 mos ago for same, ruled out, since then was medically cleared by Matthias for rotator cuff surgery, on exam vital signs appreciated, well appearing, head nc/at, perrla, eomi, conj pink nekc supple, pain along base of skull (palpation actually improves pain), +tenderness upper cervical paraspinal muscles, no midline ttp, FROM, cor reg lungs cta abd snt calves nontender pulses equal neuro intact, do not suspect anginal equivalent however will check ekg, pain constant since yesterday, will check trop, symptomatic relief

## 2019-05-11 NOTE — ED PROVIDER NOTE - NSFOLLOWUPCLINICS_GEN_ALL_ED_FT
[facility] ([facility])  Rehabilitation  [address] [place] [address]  Phone: [phone]  Fax:   Follow Up Time:

## 2019-05-11 NOTE — ED ADULT TRIAGE NOTE - BMI (KG/M2)
Accidentally Sent the methylphenadate she was on before to [facility], can you call and cancel thx. I resent to anderson    As for her URI sx has only been a few days, highly unlikely to be bacterial, needs to continue sx tx through the weekend and if not starting to improve next week let us know.
37.9

## 2019-05-11 NOTE — ED PROVIDER NOTE - PHYSICAL EXAMINATION
CONSTITUTIONAL: Well-appearing; well-nourished; in no apparent distress.   EYES: PERRL; EOM intact, no nystagmus  NECK: No midline spinal ttp. + mild left/right paraspinal cervical ttp. + full rom of c spine  CARDIOVASCULAR: Normal S1, S2; no murmurs, rubs, or gallops.   RESPIRATORY: Normal chest excursion with respiration; breath sounds clear and equal bilaterally; no wheezes, rhonchi, or rales.  GI/: Normal bowel sounds; non-distended; non-tender; no palpable organomegaly.   MS: No evidence of trauma or deformity. Normal ROM in all four extremities; non-tender to palpation; distal pulses are normal. Strength equal b/l 5/5 throughout  SKIN: Normal for age and race; warm; dry; good turgor; no apparent lesions or exudate.   NEURO/PSYCH: A & O x 4; grossly unremarkable. mood and manner are appropriate. No focal deficits. No facial droop. No tongue deviation. Cerebellar intact. Sensation intact. Normal gait.

## 2019-05-11 NOTE — ED PROVIDER NOTE - NS ED ROS FT
Constitutional: no fever, chills, no recent weight loss, change in appetite or malaise  Eyes: no redness/discharge/pain/vision changes  Cardiac: No chest pain, SOB or edema.  Respiratory: No cough or respiratory distress  GI: No nausea, vomiting, diarrhea or abdominal pain.  : No dysuria, frequency, urgency or hematuria  MS: + neck pain. no loss of ROM, no weakness  Neuro: No headache or weakness. No LOC.  Skin: No skin rash.  Endocrine: + hx of DM  Except as documented in the HPI, all other systems are negative.

## 2019-05-11 NOTE — ED PROVIDER NOTE - CLINICAL SUMMARY MEDICAL DECISION MAKING FREE TEXT BOX
Pt with msk neck pain after sleeping wrong on couch, pt concerned given cardiac hx, PE as above, ekg and trop appreciated, will d/c supportive care, pmd f/u. Patient counseled regarding conditions which should prompt return.

## 2019-05-11 NOTE — ED PROVIDER NOTE - OBJECTIVE STATEMENT
73 Year old M with hx of DM s/p MI in 2000 s/p 4 stents, on plavix, hypothyroidism, DVT c/o neck pain x 1 day. Pt sts the neck pain started after he fell asleep on his recliner chair yesterday. The pain has been constant, non radiating. It is worse with movement. He did not take anything for the pain. He denies any assoc headache, fever/chills, visual changes, back pain, trauma/injuries, photophobia, nausea, vomiting, weakness, paresthesias, inability to ambulate, chest pain, sob.

## 2019-05-11 NOTE — ED PROVIDER NOTE - CARE PROVIDER_API CALL
Jose Raul Rizzo)  Cardiovascular Disease; Internal Medicine; Nuclear Cardiology  [address] [place] [address], [place] [address]  Phone: [phone]  Fax: [phone]  Follow Up Time:

## 2019-05-13 ENCOUNTER — EMERGENCY (EMERGENCY)
Facility: HOSPITAL | Age: 74
LOS: 0 days | Discharge: HOME | End: 2019-05-13
Attending: EMERGENCY MEDICINE | Admitting: EMERGENCY MEDICINE
Payer: MEDICARE

## 2019-05-13 VITALS
TEMPERATURE: 100 F | RESPIRATION RATE: 18 BRPM | OXYGEN SATURATION: 99 % | DIASTOLIC BLOOD PRESSURE: 93 MMHG | WEIGHT: 272.05 LBS | SYSTOLIC BLOOD PRESSURE: 141 MMHG | HEART RATE: 93 BPM

## 2019-05-13 VITALS
DIASTOLIC BLOOD PRESSURE: 107 MMHG | OXYGEN SATURATION: 99 % | HEART RATE: 87 BPM | RESPIRATION RATE: 18 BRPM | SYSTOLIC BLOOD PRESSURE: 167 MMHG

## 2019-05-13 DIAGNOSIS — I25.2 OLD MYOCARDIAL INFARCTION: ICD-10-CM

## 2019-05-13 DIAGNOSIS — I25.10 ATHEROSCLEROTIC HEART DISEASE OF NATIVE CORONARY ARTERY WITHOUT ANGINA PECTORIS: Chronic | ICD-10-CM

## 2019-05-13 DIAGNOSIS — E03.9 HYPOTHYROIDISM, UNSPECIFIED: ICD-10-CM

## 2019-05-13 DIAGNOSIS — R51 HEADACHE: ICD-10-CM

## 2019-05-13 DIAGNOSIS — Z79.02 LONG TERM (CURRENT) USE OF ANTITHROMBOTICS/ANTIPLATELETS: ICD-10-CM

## 2019-05-13 DIAGNOSIS — Z98.890 OTHER SPECIFIED POSTPROCEDURAL STATES: Chronic | ICD-10-CM

## 2019-05-13 DIAGNOSIS — M54.2 CERVICALGIA: ICD-10-CM

## 2019-05-13 DIAGNOSIS — E78.00 PURE HYPERCHOLESTEROLEMIA, UNSPECIFIED: ICD-10-CM

## 2019-05-13 DIAGNOSIS — E11.9 TYPE 2 DIABETES MELLITUS WITHOUT COMPLICATIONS: ICD-10-CM

## 2019-05-13 DIAGNOSIS — Z88.1 ALLERGY STATUS TO OTHER ANTIBIOTIC AGENTS STATUS: ICD-10-CM

## 2019-05-13 DIAGNOSIS — Z95.5 PRESENCE OF CORONARY ANGIOPLASTY IMPLANT AND GRAFT: ICD-10-CM

## 2019-05-13 DIAGNOSIS — I25.10 ATHEROSCLEROTIC HEART DISEASE OF NATIVE CORONARY ARTERY WITHOUT ANGINA PECTORIS: ICD-10-CM

## 2019-05-13 DIAGNOSIS — N40.0 BENIGN PROSTATIC HYPERPLASIA WITHOUT LOWER URINARY TRACT SYMPTOMS: ICD-10-CM

## 2019-05-13 DIAGNOSIS — K21.9 GASTRO-ESOPHAGEAL REFLUX DISEASE WITHOUT ESOPHAGITIS: ICD-10-CM

## 2019-05-13 LAB
ALBUMIN SERPL ELPH-MCNC: 4.6 G/DL — SIGNIFICANT CHANGE UP (ref 3.5–5.2)
ALP SERPL-CCNC: 78 U/L — SIGNIFICANT CHANGE UP (ref 30–115)
ALT FLD-CCNC: 19 U/L — SIGNIFICANT CHANGE UP (ref 0–41)
ANION GAP SERPL CALC-SCNC: 14 MMOL/L — SIGNIFICANT CHANGE UP (ref 7–14)
AST SERPL-CCNC: 17 U/L — SIGNIFICANT CHANGE UP (ref 0–41)
BASOPHILS # BLD AUTO: 0.01 K/UL — SIGNIFICANT CHANGE UP (ref 0–0.2)
BASOPHILS NFR BLD AUTO: 0.1 % — SIGNIFICANT CHANGE UP (ref 0–1)
BILIRUB SERPL-MCNC: 0.4 MG/DL — SIGNIFICANT CHANGE UP (ref 0.2–1.2)
BUN SERPL-MCNC: 13 MG/DL — SIGNIFICANT CHANGE UP (ref 10–20)
CALCIUM SERPL-MCNC: 9.7 MG/DL — SIGNIFICANT CHANGE UP (ref 8.5–10.1)
CHLORIDE SERPL-SCNC: 100 MMOL/L — SIGNIFICANT CHANGE UP (ref 98–110)
CO2 SERPL-SCNC: 26 MMOL/L — SIGNIFICANT CHANGE UP (ref 17–32)
CREAT SERPL-MCNC: 0.8 MG/DL — SIGNIFICANT CHANGE UP (ref 0.7–1.5)
EOSINOPHIL # BLD AUTO: 0.06 K/UL — SIGNIFICANT CHANGE UP (ref 0–0.7)
EOSINOPHIL NFR BLD AUTO: 0.8 % — SIGNIFICANT CHANGE UP (ref 0–8)
GLUCOSE SERPL-MCNC: 129 MG/DL — HIGH (ref 70–99)
HCT VFR BLD CALC: 42.4 % — SIGNIFICANT CHANGE UP (ref 42–52)
HGB BLD-MCNC: 14.3 G/DL — SIGNIFICANT CHANGE UP (ref 14–18)
IMM GRANULOCYTES NFR BLD AUTO: 0.3 % — SIGNIFICANT CHANGE UP (ref 0.1–0.3)
LYMPHOCYTES # BLD AUTO: 1.52 K/UL — SIGNIFICANT CHANGE UP (ref 1.2–3.4)
LYMPHOCYTES # BLD AUTO: 21.4 % — SIGNIFICANT CHANGE UP (ref 20.5–51.1)
MCHC RBC-ENTMCNC: 29.5 PG — SIGNIFICANT CHANGE UP (ref 27–31)
MCHC RBC-ENTMCNC: 33.7 G/DL — SIGNIFICANT CHANGE UP (ref 32–37)
MCV RBC AUTO: 87.6 FL — SIGNIFICANT CHANGE UP (ref 80–94)
MONOCYTES # BLD AUTO: 0.67 K/UL — HIGH (ref 0.1–0.6)
MONOCYTES NFR BLD AUTO: 9.4 % — HIGH (ref 1.7–9.3)
NEUTROPHILS # BLD AUTO: 4.83 K/UL — SIGNIFICANT CHANGE UP (ref 1.4–6.5)
NEUTROPHILS NFR BLD AUTO: 68 % — SIGNIFICANT CHANGE UP (ref 42.2–75.2)
NRBC # BLD: 0 /100 WBCS — SIGNIFICANT CHANGE UP (ref 0–0)
PLATELET # BLD AUTO: 152 K/UL — SIGNIFICANT CHANGE UP (ref 130–400)
POTASSIUM SERPL-MCNC: 4.7 MMOL/L — SIGNIFICANT CHANGE UP (ref 3.5–5)
POTASSIUM SERPL-SCNC: 4.7 MMOL/L — SIGNIFICANT CHANGE UP (ref 3.5–5)
PROT SERPL-MCNC: 7.7 G/DL — SIGNIFICANT CHANGE UP (ref 6–8)
RBC # BLD: 4.84 M/UL — SIGNIFICANT CHANGE UP (ref 4.7–6.1)
RBC # FLD: 13.5 % — SIGNIFICANT CHANGE UP (ref 11.5–14.5)
SODIUM SERPL-SCNC: 140 MMOL/L — SIGNIFICANT CHANGE UP (ref 135–146)
TROPONIN T SERPL-MCNC: <0.01 NG/ML — SIGNIFICANT CHANGE UP
WBC # BLD: 7.11 K/UL — SIGNIFICANT CHANGE UP (ref 4.8–10.8)
WBC # FLD AUTO: 7.11 K/UL — SIGNIFICANT CHANGE UP (ref 4.8–10.8)

## 2019-05-13 PROCEDURE — 71045 X-RAY EXAM CHEST 1 VIEW: CPT | Mod: 26

## 2019-05-13 PROCEDURE — 99285 EMERGENCY DEPT VISIT HI MDM: CPT

## 2019-05-13 PROCEDURE — 70498 CT ANGIOGRAPHY NECK: CPT | Mod: 26

## 2019-05-13 RX ORDER — MORPHINE SULFATE 50 MG/1
6 CAPSULE, EXTENDED RELEASE ORAL ONCE
Refills: 0 | Status: DISCONTINUED | OUTPATIENT
Start: 2019-05-13 | End: 2019-05-13

## 2019-05-13 RX ORDER — METHOCARBAMOL 500 MG/1
1 TABLET, FILM COATED ORAL
Qty: 9 | Refills: 0
Start: 2019-05-13 | End: 2019-05-15

## 2019-05-13 RX ADMIN — MORPHINE SULFATE 6 MILLIGRAM(S): 50 CAPSULE, EXTENDED RELEASE ORAL at 16:41

## 2019-05-13 NOTE — ED PROVIDER NOTE - NSFOLLOWUPCLINICS_GEN_ALL_ED_FT
[facility] ([facility])  Rehabilitation  [address] [place] [address]  Phone: [phone]  Fax:   Follow Up Time:

## 2019-05-13 NOTE — ED PROVIDER NOTE - NSFOLLOWUPINSTRUCTIONS_ED_ALL_ED_FT
Follow up with primary care provider and Rehab in 1-2 days.    Chronic Pain    Chronic pain is a complex condition and the Emergency Department is not the ideal place to manage this condition. Prescription painkillers must be written by your primary care provider or a pain management physician. Avoid activities or triggers that exacerbate your pain.    SEEK IMMEDIATE MEDICAL CARE IF YOU HAVE ANY OF THE FOLLOWING SYMPTOMS: severe chest pain, fainting, vomiting blood, dark or bloody stools, or pain different from your chronic pain.

## 2019-05-13 NOTE — ED PROVIDER NOTE - PHYSICAL EXAMINATION
CONST: Well appearing in NAD  EYES: PERRL, EOMI, Sclera and conjunctiva clear.  ENT: No nasal discharge. Oropharynx normal appearing, no erythema or exudates. No abscess or swelling. Uvula midline.  NECK: Tenderness with ROM, reproducible with palpation along the trapizium. supple   CARD: S1 S2; No jvd  RESP: Equal BS B/L, No wheezes, rhonchi or rales. No distress  GI: Soft, non-tender, non-distended. no cva tenderness. normal BS  MS: Normal ROM in all extremities. No midline spinal tenderness. pulses 2 +. no calf tenderness or swelling  SKIN: Warm, dry, no acute rashes.   NEURO: A&Ox3, No focal deficits. Strength 5/5 with no sensory deficits. Steady gait. Finger to nose intact. Negative pronator drift

## 2019-05-13 NOTE — ED PROVIDER NOTE - CLINICAL SUMMARY MEDICAL DECISION MAKING FREE TEXT BOX
ct neg for dissection, trop neg, ekg neg, pt feelign better after meds, will f/u rehab and massage therapy for neck spasm.

## 2019-05-13 NOTE — ED PROVIDER NOTE - PROGRESS NOTE DETAILS
Results d/w patient and family and copies of results provided.  Pt instructed to return if any worsening symptoms or concerns.  They verbalize understanding. PA fellow note reviewed, CT neg, Dc'd home

## 2019-05-13 NOTE — ED PROVIDER NOTE - NS ED ROS FT
Constitutional: (-) fever  Eyes/ENT: (-) blurry vision, (-) epistaxis  Cardiovascular: (-) chest pain, (-) syncope  Respiratory: (-) cough, (-) shortness of breath  Gastrointestinal: (-) vomiting, (-) diarrhea  Musculoskeletal: (+) neck pain, (-) back pain, (-) joint pain  Integumentary: (-) rash, (-) edema  Neurological: (-) headache, (-) altered mental status  Psychiatric: (-) hallucinations  Allergic/Immunologic: (-) pruritus

## 2019-05-13 NOTE — ED ADULT TRIAGE NOTE - PAIN: PRESENCE, MLM
EXAM:

  CT Abdomen and Pelvis Without Intravenous Contrast



CLINICAL HISTORY:

  31 years old, male; Pain; Abdominal pain; Other: Back and hematuria; 

Additional info: Back pain, hematuria



TECHNIQUE:

  Axial computed tomography images of the abdomen and pelvis without 

intravenous contrast.  This CT exam was performed using one or more of the 

following dose reduction techniques:  automated exposure control, adjustment of 

the mA and/or kV according to patient size, and/or use of iterative 

reconstruction technique.

  Coronal and sagittal reformatted images were created and reviewed.



EXAM DATE/TIME:

  5/20/2017 2:09 AM



COMPARISON:

  No relevant prior studies available.



FINDINGS:

  LIMITATIONS:  Exam is somewhat limited by mild streak/motion artifact.

  LOWER THORAX:  No infiltrate seen in the lung bases.



 ABDOMEN:

  LIVER:  No acute abnormality of the liver identified.

  GALLBLADDER AND BILE DUCTS:  No CT evidence of acute cholecystitis.  No 

evidence of significant biliary ductal dilatation.

  PANCREAS:  No CT evidence of acute pancreatitis.

  SPLEEN:  No acute abnormality of the spleen identified.

  ADRENALS:  No acute abnormality of the adrenal glands identified.

  KIDNEYS AND URETERS:  No acute abnormality of the kidneys seen.  No renal 

stones, hydronephrosis, or hydroureter seen.

  STOMACH AND BOWEL:  No acute abnormality of the stomach or duodenum 

identified. No evidence of small bowel obstruction. No acute abnormality of the 

colon identified.

  APPENDIX:  Appendix is seen, and is within normal limits in appearance.



 PELVIS:

  BLADDER:  No acute abnormality of the bladder identified.

  REPRODUCTIVE: See below. 



 ABDOMEN and PELVIS:

  INTRAPERITONEAL SPACE:  No evidence of free intraperitoneal air or fluid.

  BONES/JOINTS:  No acute fractures or other acute bony abnormality noted.

  SOFT TISSUES:  Best seen on images 55-77 of series 601, there is a bandlike 

area of soft tissue in the ischiorectal fossa on the right which appears to 

connect to the right demond of the penis.  This is of uncertain etiology.  There 

is no adjacent inflammation or associated gas.

  VASCULATURE:  No evidence of abdominal aortic aneurysm. No evidence of 

periaortic hemorrhage.

  LYMPH NODES:  No evidence of diffuse lymphadenopathy.



IMPRESSION:     

- No evidence of significant acute process on this unenhanced exam. There is no 

evidence of nephrolithiasis or obstructive uropathy.

- An abnormal, bandlike area of soft tissue is seen in the right ischiorectal 

fossa laterally, which appears to connect with the demond of the penis on the 

right.  This is of uncertain etiology, but could represent some type of 

congenital anomaly.  It does not have a CT appearance to suggest an abscess or 

other acute inflammatory process. Recommend clinical correlation.  Nonemergent 

pelvic MRI may be helpful for further evaluation.  

- See above for remaining findings.
complains of pain/discomfort/neck

## 2019-05-13 NOTE — ED PROVIDER NOTE - ATTENDING CONTRIBUTION TO CARE
73M to ED with neck pain L lateral neck since sleeping on neck 2d ago.  pain persistent and worse with movement, assoc with HA.  No fevers  h/o MI with stent on plavix  AVSS, exam as noted, CTAB, RRR, abdomen soft NTND, (+) bowel sounds, neuro nonfocal

## 2019-05-13 NOTE — ED PROVIDER NOTE - CARE PROVIDER_API CALL
Rk Avelar)  [facility]  [address] [place] [address], [address]  [place] [address]  Phone: [phone]  Fax: [phone]  Follow Up Time:

## 2019-05-13 NOTE — ED PROVIDER NOTE - OBJECTIVE STATEMENT
73y M PMH back pain, CAD, DM, BPH, ETELVINA presents for Left sided neck pain. Pt states he has had moderate, cramping left sided neck pain radiating to his left shoulder, worse with movement, no relieving factors. Denies swelling, cp, sob, weakness, numbness, n/v

## 2019-05-21 ENCOUNTER — APPOINTMENT (OUTPATIENT)
Dept: CARDIOLOGY | Facility: CLINIC | Age: 74
End: 2019-05-21
Payer: MEDICARE

## 2019-05-21 PROCEDURE — 93000 ELECTROCARDIOGRAM COMPLETE: CPT

## 2019-05-21 PROCEDURE — 99214 OFFICE O/P EST MOD 30 MIN: CPT

## 2019-08-26 ENCOUNTER — OUTPATIENT (OUTPATIENT)
Dept: OUTPATIENT SERVICES | Facility: HOSPITAL | Age: 74
LOS: 1 days | Discharge: HOME | End: 2019-08-26

## 2019-08-26 DIAGNOSIS — E11.9 TYPE 2 DIABETES MELLITUS WITHOUT COMPLICATIONS: ICD-10-CM

## 2019-08-26 DIAGNOSIS — Z98.890 OTHER SPECIFIED POSTPROCEDURAL STATES: Chronic | ICD-10-CM

## 2019-08-26 DIAGNOSIS — E78.5 HYPERLIPIDEMIA, UNSPECIFIED: ICD-10-CM

## 2019-08-26 DIAGNOSIS — N39.41 URGE INCONTINENCE: ICD-10-CM

## 2019-08-26 DIAGNOSIS — I10 ESSENTIAL (PRIMARY) HYPERTENSION: ICD-10-CM

## 2019-08-26 DIAGNOSIS — I25.10 ATHEROSCLEROTIC HEART DISEASE OF NATIVE CORONARY ARTERY WITHOUT ANGINA PECTORIS: Chronic | ICD-10-CM

## 2019-09-06 ENCOUNTER — OUTPATIENT (OUTPATIENT)
Dept: OUTPATIENT SERVICES | Facility: HOSPITAL | Age: 74
LOS: 1 days | Discharge: HOME | End: 2019-09-06

## 2019-09-06 DIAGNOSIS — I25.10 ATHEROSCLEROTIC HEART DISEASE OF NATIVE CORONARY ARTERY WITHOUT ANGINA PECTORIS: Chronic | ICD-10-CM

## 2019-09-06 DIAGNOSIS — Z98.890 OTHER SPECIFIED POSTPROCEDURAL STATES: Chronic | ICD-10-CM

## 2019-09-06 DIAGNOSIS — R82.71 BACTERIURIA: ICD-10-CM

## 2019-09-14 ENCOUNTER — EMERGENCY (EMERGENCY)
Facility: HOSPITAL | Age: 74
LOS: 0 days | Discharge: HOME | End: 2019-09-14
Attending: EMERGENCY MEDICINE | Admitting: EMERGENCY MEDICINE
Payer: MEDICARE

## 2019-09-14 VITALS
HEIGHT: 71 IN | TEMPERATURE: 98 F | HEART RATE: 80 BPM | OXYGEN SATURATION: 98 % | RESPIRATION RATE: 18 BRPM | WEIGHT: 279.99 LBS | DIASTOLIC BLOOD PRESSURE: 96 MMHG | SYSTOLIC BLOOD PRESSURE: 161 MMHG

## 2019-09-14 VITALS
RESPIRATION RATE: 18 BRPM | SYSTOLIC BLOOD PRESSURE: 130 MMHG | OXYGEN SATURATION: 99 % | DIASTOLIC BLOOD PRESSURE: 84 MMHG | TEMPERATURE: 98 F | HEART RATE: 70 BPM

## 2019-09-14 DIAGNOSIS — E78.00 PURE HYPERCHOLESTEROLEMIA, UNSPECIFIED: ICD-10-CM

## 2019-09-14 DIAGNOSIS — I25.10 ATHEROSCLEROTIC HEART DISEASE OF NATIVE CORONARY ARTERY WITHOUT ANGINA PECTORIS: Chronic | ICD-10-CM

## 2019-09-14 DIAGNOSIS — I48.91 UNSPECIFIED ATRIAL FIBRILLATION: ICD-10-CM

## 2019-09-14 DIAGNOSIS — Z79.899 OTHER LONG TERM (CURRENT) DRUG THERAPY: ICD-10-CM

## 2019-09-14 DIAGNOSIS — Z88.1 ALLERGY STATUS TO OTHER ANTIBIOTIC AGENTS STATUS: ICD-10-CM

## 2019-09-14 DIAGNOSIS — Z98.890 OTHER SPECIFIED POSTPROCEDURAL STATES: Chronic | ICD-10-CM

## 2019-09-14 DIAGNOSIS — I25.10 ATHEROSCLEROTIC HEART DISEASE OF NATIVE CORONARY ARTERY WITHOUT ANGINA PECTORIS: ICD-10-CM

## 2019-09-14 DIAGNOSIS — I10 ESSENTIAL (PRIMARY) HYPERTENSION: ICD-10-CM

## 2019-09-14 DIAGNOSIS — Z95.5 PRESENCE OF CORONARY ANGIOPLASTY IMPLANT AND GRAFT: ICD-10-CM

## 2019-09-14 DIAGNOSIS — R10.9 UNSPECIFIED ABDOMINAL PAIN: ICD-10-CM

## 2019-09-14 DIAGNOSIS — N20.0 CALCULUS OF KIDNEY: ICD-10-CM

## 2019-09-14 DIAGNOSIS — Z79.02 LONG TERM (CURRENT) USE OF ANTITHROMBOTICS/ANTIPLATELETS: ICD-10-CM

## 2019-09-14 LAB
ALBUMIN SERPL ELPH-MCNC: 4.9 G/DL — SIGNIFICANT CHANGE UP (ref 3.5–5.2)
ALP SERPL-CCNC: 72 U/L — SIGNIFICANT CHANGE UP (ref 30–115)
ALT FLD-CCNC: 34 U/L — SIGNIFICANT CHANGE UP (ref 0–41)
ANION GAP SERPL CALC-SCNC: 13 MMOL/L — SIGNIFICANT CHANGE UP (ref 7–14)
APPEARANCE UR: CLEAR — SIGNIFICANT CHANGE UP
APTT BLD: 35.6 SEC — SIGNIFICANT CHANGE UP (ref 27–39.2)
AST SERPL-CCNC: 30 U/L — SIGNIFICANT CHANGE UP (ref 0–41)
BACTERIA # UR AUTO: ABNORMAL
BASOPHILS # BLD AUTO: 0.02 K/UL — SIGNIFICANT CHANGE UP (ref 0–0.2)
BASOPHILS NFR BLD AUTO: 0.3 % — SIGNIFICANT CHANGE UP (ref 0–1)
BILIRUB SERPL-MCNC: 0.4 MG/DL — SIGNIFICANT CHANGE UP (ref 0.2–1.2)
BILIRUB UR-MCNC: NEGATIVE — SIGNIFICANT CHANGE UP
BUN SERPL-MCNC: 16 MG/DL — SIGNIFICANT CHANGE UP (ref 10–20)
CALCIUM SERPL-MCNC: 9.9 MG/DL — SIGNIFICANT CHANGE UP (ref 8.5–10.1)
CHLORIDE SERPL-SCNC: 100 MMOL/L — SIGNIFICANT CHANGE UP (ref 98–110)
CO2 SERPL-SCNC: 26 MMOL/L — SIGNIFICANT CHANGE UP (ref 17–32)
COLOR SPEC: YELLOW — SIGNIFICANT CHANGE UP
CREAT SERPL-MCNC: 0.9 MG/DL — SIGNIFICANT CHANGE UP (ref 0.7–1.5)
DIFF PNL FLD: ABNORMAL
EOSINOPHIL # BLD AUTO: 0.08 K/UL — SIGNIFICANT CHANGE UP (ref 0–0.7)
EOSINOPHIL NFR BLD AUTO: 1.3 % — SIGNIFICANT CHANGE UP (ref 0–8)
EPI CELLS # UR: ABNORMAL /HPF
GLUCOSE SERPL-MCNC: 183 MG/DL — HIGH (ref 70–99)
GLUCOSE UR QL: NEGATIVE MG/DL — SIGNIFICANT CHANGE UP
GRAN CASTS # UR COMP ASSIST: ABNORMAL /LPF
HCT VFR BLD CALC: 43.7 % — SIGNIFICANT CHANGE UP (ref 42–52)
HGB BLD-MCNC: 14.6 G/DL — SIGNIFICANT CHANGE UP (ref 14–18)
IMM GRANULOCYTES NFR BLD AUTO: 0.3 % — SIGNIFICANT CHANGE UP (ref 0.1–0.3)
INR BLD: 1.03 RATIO — SIGNIFICANT CHANGE UP (ref 0.65–1.3)
KETONES UR-MCNC: NEGATIVE — SIGNIFICANT CHANGE UP
LEUKOCYTE ESTERASE UR-ACNC: NEGATIVE — SIGNIFICANT CHANGE UP
LIDOCAIN IGE QN: 34 U/L — SIGNIFICANT CHANGE UP (ref 7–60)
LYMPHOCYTES # BLD AUTO: 1.72 K/UL — SIGNIFICANT CHANGE UP (ref 1.2–3.4)
LYMPHOCYTES # BLD AUTO: 27.5 % — SIGNIFICANT CHANGE UP (ref 20.5–51.1)
MCHC RBC-ENTMCNC: 29.6 PG — SIGNIFICANT CHANGE UP (ref 27–31)
MCHC RBC-ENTMCNC: 33.4 G/DL — SIGNIFICANT CHANGE UP (ref 32–37)
MCV RBC AUTO: 88.5 FL — SIGNIFICANT CHANGE UP (ref 80–94)
MONOCYTES # BLD AUTO: 0.46 K/UL — SIGNIFICANT CHANGE UP (ref 0.1–0.6)
MONOCYTES NFR BLD AUTO: 7.3 % — SIGNIFICANT CHANGE UP (ref 1.7–9.3)
NEUTROPHILS # BLD AUTO: 3.96 K/UL — SIGNIFICANT CHANGE UP (ref 1.4–6.5)
NEUTROPHILS NFR BLD AUTO: 63.3 % — SIGNIFICANT CHANGE UP (ref 42.2–75.2)
NITRITE UR-MCNC: NEGATIVE — SIGNIFICANT CHANGE UP
NRBC # BLD: 0 /100 WBCS — SIGNIFICANT CHANGE UP (ref 0–0)
NT-PROBNP SERPL-SCNC: 251 PG/ML — SIGNIFICANT CHANGE UP (ref 0–300)
PH UR: 7 — SIGNIFICANT CHANGE UP (ref 5–8)
PLATELET # BLD AUTO: 160 K/UL — SIGNIFICANT CHANGE UP (ref 130–400)
POTASSIUM SERPL-MCNC: 4.6 MMOL/L — SIGNIFICANT CHANGE UP (ref 3.5–5)
POTASSIUM SERPL-SCNC: 4.6 MMOL/L — SIGNIFICANT CHANGE UP (ref 3.5–5)
PROT SERPL-MCNC: 7.6 G/DL — SIGNIFICANT CHANGE UP (ref 6–8)
PROT UR-MCNC: >=300 MG/DL
PROTHROM AB SERPL-ACNC: 11.8 SEC — SIGNIFICANT CHANGE UP (ref 9.95–12.87)
RBC # BLD: 4.94 M/UL — SIGNIFICANT CHANGE UP (ref 4.7–6.1)
RBC # FLD: 13.2 % — SIGNIFICANT CHANGE UP (ref 11.5–14.5)
RBC CASTS # UR COMP ASSIST: ABNORMAL /HPF
SODIUM SERPL-SCNC: 139 MMOL/L — SIGNIFICANT CHANGE UP (ref 135–146)
SP GR SPEC: >=1.03 (ref 1.01–1.03)
TROPONIN T SERPL-MCNC: <0.01 NG/ML — SIGNIFICANT CHANGE UP
UROBILINOGEN FLD QL: 0.2 MG/DL — SIGNIFICANT CHANGE UP (ref 0.2–0.2)
WBC # BLD: 6.26 K/UL — SIGNIFICANT CHANGE UP (ref 4.8–10.8)
WBC # FLD AUTO: 6.26 K/UL — SIGNIFICANT CHANGE UP (ref 4.8–10.8)
WBC UR QL: SIGNIFICANT CHANGE UP /HPF

## 2019-09-14 PROCEDURE — 74177 CT ABD & PELVIS W/CONTRAST: CPT | Mod: 26

## 2019-09-14 PROCEDURE — 99284 EMERGENCY DEPT VISIT MOD MDM: CPT

## 2019-09-14 RX ORDER — SODIUM CHLORIDE 9 MG/ML
1000 INJECTION INTRAMUSCULAR; INTRAVENOUS; SUBCUTANEOUS
Refills: 0 | Status: DISCONTINUED | OUTPATIENT
Start: 2019-09-14 | End: 2019-09-14

## 2019-09-14 RX ORDER — APIXABAN 2.5 MG/1
1 TABLET, FILM COATED ORAL
Qty: 60 | Refills: 0
Start: 2019-09-14 | End: 2019-10-13

## 2019-09-14 RX ORDER — METOPROLOL TARTRATE 50 MG
1 TABLET ORAL
Qty: 60 | Refills: 0
Start: 2019-09-14 | End: 2019-10-13

## 2019-09-14 RX ORDER — TAMSULOSIN HYDROCHLORIDE 0.4 MG/1
0.4 CAPSULE ORAL ONCE
Refills: 0 | Status: COMPLETED | OUTPATIENT
Start: 2019-09-14 | End: 2019-09-14

## 2019-09-14 RX ADMIN — SODIUM CHLORIDE 125 MILLILITER(S): 9 INJECTION INTRAMUSCULAR; INTRAVENOUS; SUBCUTANEOUS at 09:00

## 2019-09-14 RX ADMIN — TAMSULOSIN HYDROCHLORIDE 0.4 MILLIGRAM(S): 0.4 CAPSULE ORAL at 11:44

## 2019-09-14 NOTE — ED PROVIDER NOTE - OBJECTIVE STATEMENT
73 yo M hx of prostate problem, CAD with stents, HTN, DM, high cholesterol c/o left flank pain since 4 am. Pain is intermittent, moderate to severe in severity, with no modifying factors.  +n/v. No f/c/v/d. No dysuria or hematuria.

## 2019-09-14 NOTE — ED PROVIDER NOTE - PROVIDER TOKENS
FREE:[LAST:[your PMD 1 day],PHONE:[(   )    -],FAX:[(   )    -]],PROVIDER:[TOKEN:[[id number]],FOLLOWUP:[1-3 Days]],PROVIDER:[TOKEN:[[id number]],FOLLOWUP:[1-3 Days]]

## 2019-09-14 NOTE — ED ADULT NURSE NOTE - NSIMPLEMENTINTERV_GEN_ALL_ED
Implemented All Universal Safety Interventions:  [place] to call system. Call bell, personal items and telephone within reach. Instruct patient to call for assistance. Room bathroom lighting operational. Non-slip footwear when patient is off stretcher. Physically safe environment: no spills, clutter or unnecessary equipment. Stretcher in lowest position, wheels locked, appropriate side rails in place.

## 2019-09-14 NOTE — ED PROVIDER NOTE - PROGRESS NOTE DETAILS
Sx ESTELA Landin aware and will see patient Dr. Sabillon spoke with  cards --> give Eliquis 5mg BID, metoprolol 25mg BID

## 2019-09-14 NOTE — ED PROVIDER NOTE - NS ED ROS FT
Review of Systems    Constitutional: (-) fever, (-) chills  Eyes/ENT: (-) blurry vision, (-) epistaxis, (-) sore throat  Cardiovascular: (-) chest pain, (-) syncope  Respiratory: (-) cough, (-) shortness of breath  Gastrointestinal: (+) pain, (+) nausea, (+) vomiting, (-) diarrhea  Musculoskeletal: (-) neck pain, (+) back pain, (-) joint pain  Integumentary: (-) rash, (-) edema  Neurological: (-) headache, (-) altered mental status  Psychiatric: (-) hallucinations  Allergic/Immunologic: (-) pruritus

## 2019-09-14 NOTE — ED PROVIDER NOTE - PATIENT PORTAL LINK FT
You can access the FollowMyHealth Patient Portal offered by [facility] by registering at the following website: [email or web address]. By joining [facility]’s FollowMyHealth portal, you will also be able to view your health information using other applications (apps) compatible with our system.

## 2019-09-14 NOTE — ED PROVIDER NOTE - PHYSICAL EXAMINATION
Gen: Alert, NAD, well appearing  Head: NC, AT, PERRL, EOMI, normal lids/conjunctiva  ENT: normal hearing, patent oropharynx without erythema/exudate  Neck: +supple, no tenderness/meningismus,  Pulm: Bilateral BS, normal resp effort, no wheeze/stridor/retractions  CV: S1S2, irregular  Abd: soft, NT/ND. No CVA tenderness. No flank tenderness  Mskel: no edema/erythema/cyanosis  Skin: no rash, warm/dry  Neuro: AAOx3, no sensory/motor deficits

## 2019-09-14 NOTE — ED PROVIDER NOTE - ATTENDING CONTRIBUTION TO CARE
I personally evaluated patient. I agree with the findings and plan with all documentation on chart except as documented  in my note.    75 yo M hx of prostate problem, CAD with stents, HTN, DM, high cholesterol c/o left flank pain since 4 am. Pain is intermittent, moderate to severe in severity, with no modifying factors.  +n/v. No f/c/v/d. No dysuria or hematuria.  Patient found to have pain from a symptomatic kidney stone.    On exam, patient irregularly irregular.  Clinical concern for possible A Fib and EKG done and consistent with A Fib. Cardiologist is Dr. Rizzo and we attempted to page.  Case later discussed with Cardiology fellow.  Given Chads-Vasc score, plan made to anticoagulate. Will start NOAC. Will start beta blocker and patient to immediately follow up with Dr. Rizzo. Bleeding risks and warning signs discussed.    Work up consistent with a symptomatic kidney stone.  Pain controlled in the Emergency Department.  Labs, imaging and urine reviewed. Patient evaluated by Urology. Patient is well appearing and a good candidate for outpatient management.  Size of stone and chance of passing discussed. Will hold NSAID given that pain is resolved and patient being started on NSAID.    Full DC instructions discussed and patient knows when to seek immediate medical attention.  Patient has proper follow up.  All results discussed and patient aware they may require further work up.  Proper follow up ensured. Limitations of ED work up discussed.  Medications administered and prescribed/OTC home meds discussed.  All questions and concerns from patient or family addressed. Understanding of instructions verbalized.

## 2019-09-14 NOTE — ED ADULT NURSE NOTE - PMH
Back pain    BPH (benign prostatic hyperplasia)    CAD (coronary artery disease)  STENTS 2000 AND 2002  Diabetes    DVT (deep venous thrombosis)  LEFT LE- 2008 AND 2018 AFTER INJURY  Esophagitis    GERD (gastroesophageal reflux disease)    High cholesterol    Hypertension complications    Hypothyroid    ETELVIAN (obstructive sleep apnea)  NOT USING cpap  Pain    Seizure  last episode 2 yrs ago

## 2019-09-14 NOTE — ED PROVIDER NOTE - CLINICAL SUMMARY MEDICAL DECISION MAKING FREE TEXT BOX
On exam, patient irregularly irregular.  Clinical concern for possible A Fib and EKG done and consistent with A Fib. Cardiologist is Dr. Rizzo and we attempted to page.  Case later discussed with Cardiology fellow.  Given Chads-Vasc score, plan made to anticoagulate. Will start NOAC. Will start beta blocker and patient to immediately follow up with Dr. Rizzo. Bleeding risks and warning signs discussed.    Work up consistent with a symptomatic kidney stone.  Pain controlled in the Emergency Department.  Labs, imaging and urine reviewed. Patient evaluated by Urology. Patient is well appearing and a good candidate for outpatient management.  Size of stone and chance of passing discussed. Will hold NSAID given that pain is resolved and patient being started on NSAID.    Full DC instructions discussed and patient knows when to seek immediate medical attention.  Patient has proper follow up.  All results discussed and patient aware they may require further work up.  Proper follow up ensured. Limitations of ED work up discussed.  Medications administered and prescribed/OTC home meds discussed.  All questions and concerns from patient or family addressed. Understanding of instructions verbalized.

## 2019-09-14 NOTE — CONSULT NOTE ADULT - ASSESSMENT
74 yr old male with left flank/groin pain since AM, now with left proximal calculi. Ptr also found to have afib w/RVR while in ER    1. Left proximal ureteral calculi, no hydronephrosis   - Case D/W Dr. Jovel: no acute  intervention at this time. Recommend to give patient a trial of passage: continue flomax, encourage PO fluids, strain urine and pain medications (toradol).  If patient admitted for cardio reasons, will follow. Otherwise he may F/U with Dr. Saxena on Monday 9/16 for further recommendations    2. New onset A-fib   - f/u cardio recomendations 74 yr old male with left flank/groin pain since AM, now with left proximal calculi. Ptr also found to have afib w/RVR while in ER    1. Left proximal ureteral calculi, no hydronephrosis   - Case D/W Dr. Jovel: no acute  intervention at this time. Recommend to give patient a trial of passage: continue flomax, encourage PO fluids, strain urine and pain medications (toradol).  If patient admitted for cardio reasons, will follow. Otherwise he may F/U with Dr. Saxena on Monday 9/16 for further recommendations    2. New onset A-fib   - f/u cardio recomendations    ADDENDUM:  Per ER staff, cardiology recommends to D/C patient home with Eliquis and metoprolol.  Pt counseled  on reasons to return to ER with regards to his stone (Ex: fevers, intractable pain)

## 2019-09-14 NOTE — ED PROVIDER NOTE - CARE PROVIDER_API CALL
your PMD 1 day,   Phone: (   )    -  Fax: (   )    -  Follow Up Time:     Jose Raul Rizzo)  Cardiovascular Disease; Internal Medicine; Nuclear Cardiology  [address] [place] [id number]  Phone: [phone]  Fax: [phone]  Follow Up Time: 1-3 Days    Brayden Saxena)  Urology  [address] [place] [address]  Phone: [phone]  Fax: [phone]  Follow Up Time: 1-3 Days

## 2019-09-14 NOTE — CONSULT NOTE ADULT - SUBJECTIVE AND OBJECTIVE BOX
Patient is a 74y old  Male who presents with a chief complaint of left flank abdominal pain which began around 430 am; pain radiated to back afterwards; severs, constant; (+) N/V x 1 (+) cold sweats. Denies fevers, hematuria, dysuria. No prior episodes. Pt c/o left back pain radiating to groin for approximately 1 month, went to see Dr. Saxena last week, and had a sono/xray and was told he had an enlarged prostate and flomax was recommended (did not start yet).  Currently, he denies any flank pain.    PAST MEDICAL & SURGICAL HISTORY:  BPH (benign prostatic hyperplasia)  Esophagitis ([place])  Back pain  Pain  Hypothyroid  High cholesterol  Seizure: last episode 2 yrs ago  DVT (deep venous thrombosis): LEFT LE- 2008 AND 2018 AFTER INJURY  CAD (coronary artery disease): STENTS 2000 AND 2002  ETELVINA (obstructive sleep apnea): NOT USING cpap  GERD (gastroesophageal reflux disease);  Diabetes  Hypertension complications  History of parathyroid surgery  CAD (coronary artery disease): stents  H/O arthroscopy of knee: RT -2 AND LT -1  H/O hernia repair: RT INGUINAL -2 AND LT INGUINAL -1    Home Medications:  amLODIPine 2.5 mg oral tablet: 1 tab(s) orally once a day (14 Sep 2019 08:30)  Aptiom 800 mg oral tablet: 1 tab(s) orally once a day (at bedtime) (14 Sep 2019 08:30)  clopidogrel 75 mg oral tablet:  (14 Sep 2019 08:30)  enalapril 2.5 mg oral tablet: 1 tab(s) orally once a day (14 Sep 2019 08:30)  famotidine 40 mg oral tablet: 1 tab(s) orally once a day (at bedtime) (14 Sep 2019 08:30)  fenofibrate 145 mg oral tablet:  (14 Sep 2019 08:30)  levothyroxine 150 mcg (0.15 mg) oral tablet:  (14 Sep 2019 08:30)  losartan 100 mg oral tablet: 1 tab(s) orally once a day (14 Sep 2019 08:30)  metFORMIN 500 mg oral tablet: 1 tab(s) orally 2 times a day (14 Sep 2019 08:30)  omeprazole 40 mg oral delayed release capsule: 1 cap(s) orally once a day (14 Sep 2019 08:30)  traMADol 50 mg oral tablet:  (14 Sep 2019 08:30) PRN  Vytorin 10 mg-40 mg oral tablet: 1 tab(s) orally once a day (14 Sep 2019 08:30)      MEDICATIONS  (STANDING):  sodium chloride 0.9%. 1000 milliLiter(s) (125 mL/Hr) IV Continuous <Continuous>  tamsulosin 0.4 milliGRAM(s) Oral once    MEDICATIONS  (PRN):      Allergies    Cipro (Other)    Intolerances    SOCIAL HISTORY: denies tob/etoh/drugs;     FAMILY HISTORY:  No pertinent family history in first degree relatives      Vital Signs Last 24 Hrs  T(C): 36.7 (14 Sep 2019 08:21), Max: 36.7 (14 Sep 2019 08:21)  T(F): 98 (14 Sep 2019 08:21), Max: 98 (14 Sep 2019 08:21)  HR: 80 (14 Sep 2019 08:21) (80 - 80)  BP: 161/96 (14 Sep 2019 08:21) (161/96 - 161/96)  BP(mean): --  RR: 18 (14 Sep 2019 08:21) (18 - 18)  SpO2: 98% (14 Sep 2019 08:21) (98% - 98%)    LABS:                        14.6   6.26  )-----------( 160      ( 14 Sep 2019 08:39 )             43.7     09-14    139  |  100  |  16  ----------------------------<  183<H>  4.6   |  26  |  0.9    Ca    9.9      14 Sep 2019 08:39    TPro  7.6  /  Alb  4.9  /  TBili  0.4  /  DBili  x   /  AST  30  /  ALT  34  /  AlkPhos  72  09-14    PT/INR - ( 14 Sep 2019 09:44 )   PT: 11.80 sec;   INR: 1.03 ratio         PTT - ( 14 Sep 2019 09:44 )  PTT:35.6 sec  Urinalysis Basic - ( 14 Sep 2019 09:11 )    Color: Yellow / Appearance: Clear / SG: >=1.030 / pH: x  Gluc: x / Ketone: Negative  / Bili: Negative / Urobili: 0.2 mg/dL   Blood: x / Protein: >=300 mg/dL / Nitrite: Negative   Leuk Esterase: Negative / RBC: 26-50 /HPF / WBC 1-2 /HPF   Sq Epi: x / Non Sq Epi: Occasional /HPF / Bacteria: Few      Urine Culture:     RADIOLOGY & ADDITIONAL STUDIES:    CT Abdomen and Pelvis w/ IV Cont (09.14.19 @ 10:15) >    IMPRESSION:  6 mm proximal right ureteral stone. No hydronephrosis.

## 2019-09-15 LAB
CULTURE RESULTS: SIGNIFICANT CHANGE UP
SPECIMEN SOURCE: SIGNIFICANT CHANGE UP

## 2019-09-19 ENCOUNTER — APPOINTMENT (OUTPATIENT)
Dept: CARDIOLOGY | Facility: CLINIC | Age: 74
End: 2019-09-19
Payer: MEDICARE

## 2019-09-19 VITALS — DIASTOLIC BLOOD PRESSURE: 80 MMHG | SYSTOLIC BLOOD PRESSURE: 140 MMHG

## 2019-09-19 PROCEDURE — 99214 OFFICE O/P EST MOD 30 MIN: CPT

## 2019-09-19 PROCEDURE — 93000 ELECTROCARDIOGRAM COMPLETE: CPT

## 2019-09-19 RX ORDER — FAMOTIDINE 40 MG/1
40 TABLET, FILM COATED ORAL
Refills: 0 | Status: DISCONTINUED | COMMUNITY
End: 2019-09-19

## 2019-09-19 RX ORDER — ESLICARBAZEPINE ACETATE 800 MG/1
800 TABLET ORAL
Refills: 0 | Status: DISCONTINUED | COMMUNITY
End: 2019-09-19

## 2019-09-19 RX ORDER — LOSARTAN POTASSIUM 100 MG/1
100 TABLET, FILM COATED ORAL
Refills: 0 | Status: DISCONTINUED | COMMUNITY
End: 2019-09-19

## 2019-09-19 RX ORDER — DICYCLOMINE HYDROCHLORIDE 20 MG/1
20 TABLET ORAL
Refills: 0 | Status: DISCONTINUED | COMMUNITY
End: 2019-09-19

## 2019-09-19 NOTE — HISTORY OF PRESENT ILLNESS
[FreeTextEntry1] : Patient with history  DVT, ETELVINA dies not wear mask, HTN, DM, PCI, CAD, HLD seizures and kidney stone. Pt was in hospital bc of back pain nd pt found to have AF. CHADVASC 4. Patietn was in painand did not feel much palpitation. Patient had syncope two years a ago for unknown reason. Work up was negative. Patient denies CP, SOB, STRINGER, NV\par \par \par EKG SR 68 bpm normal intervals

## 2019-09-19 NOTE — ASSESSMENT
[FreeTextEntry1] : new oset of PAF\par - CHADVASC 4 cont AC\par - event monitor to evaluate frequency\par - obtain records echo \par - discussed possibility ablation but not now\par -  FU woith pulmonary for ETELVINA adjustment

## 2019-09-19 NOTE — PHYSICAL EXAM
[Normal Conjunctiva] : the conjunctiva exhibited no abnormalities [Eyelids - No Xanthelasma] : the eyelids demonstrated no xanthelasmas [Normal Oral Mucosa] : normal oral mucosa [No Oral Pallor] : no oral pallor [No Oral Cyanosis] : no oral cyanosis [Heart Rate And Rhythm] : heart rate and rhythm were normal [Heart Sounds] : normal S1 and S2 [Murmurs] : no murmurs present [Respiration, Rhythm And Depth] : normal respiratory rhythm and effort [Exaggerated Use Of Accessory Muscles For Inspiration] : no accessory muscle use [Auscultation Breath Sounds / Voice Sounds] : lungs were clear to auscultation bilaterally [Abdomen Soft] : soft [Abdomen Tenderness] : non-tender [Abdomen Mass (___ Cm)] : no abdominal mass palpated [Abnormal Walk] : normal gait [Gait - Sufficient For Exercise Testing] : the gait was sufficient for exercise testing [Nail Clubbing] : no clubbing of the fingernails [Cyanosis, Localized] : no localized cyanosis [Petechial Hemorrhages (___cm)] : no petechial hemorrhages [Skin Color & Pigmentation] : normal skin color and pigmentation [] : no rash [No Venous Stasis] : no venous stasis [Skin Lesions] : no skin lesions [No Skin Ulcers] : no skin ulcer [No Xanthoma] : no  xanthoma was observed

## 2019-09-24 ENCOUNTER — OUTPATIENT (OUTPATIENT)
Dept: OUTPATIENT SERVICES | Facility: HOSPITAL | Age: 74
LOS: 1 days | Discharge: HOME | End: 2019-09-24

## 2019-09-24 VITALS
SYSTOLIC BLOOD PRESSURE: 138 MMHG | WEIGHT: 279.99 LBS | OXYGEN SATURATION: 97 % | RESPIRATION RATE: 16 BRPM | TEMPERATURE: 98 F | DIASTOLIC BLOOD PRESSURE: 62 MMHG | HEIGHT: 71 IN | HEART RATE: 74 BPM

## 2019-09-24 DIAGNOSIS — I25.10 ATHEROSCLEROTIC HEART DISEASE OF NATIVE CORONARY ARTERY WITHOUT ANGINA PECTORIS: Chronic | ICD-10-CM

## 2019-09-24 DIAGNOSIS — Z01.818 ENCOUNTER FOR OTHER PREPROCEDURAL EXAMINATION: ICD-10-CM

## 2019-09-24 DIAGNOSIS — Z98.890 OTHER SPECIFIED POSTPROCEDURAL STATES: Chronic | ICD-10-CM

## 2019-09-24 DIAGNOSIS — Z98.49 CATARACT EXTRACTION STATUS, UNSPECIFIED EYE: Chronic | ICD-10-CM

## 2019-09-24 DIAGNOSIS — N20.1 CALCULUS OF URETER: ICD-10-CM

## 2019-09-24 RX ORDER — CLOPIDOGREL BISULFATE 75 MG/1
0 TABLET, FILM COATED ORAL
Qty: 0 | Refills: 0 | DISCHARGE

## 2019-09-24 NOTE — H&P PST ADULT - NSICDXPASTSURGICALHX_GEN_ALL_CORE_FT
PAST SURGICAL HISTORY:  CAD (coronary artery disease) stents    H/O arthroscopy of knee RT -2 AND LT -1    H/O arthroscopy of shoulder RT ROTATOR CUFF 2019    H/O hernia repair RT INGUINAL -2 AND LT INGUINAL -1    History of parathyroid surgery PAST SURGICAL HISTORY:  CAD (coronary artery disease) stents    H/O arthroscopy of knee RT -2 AND LT -1    H/O arthroscopy of shoulder RT ROTATOR CUFF 2019    H/O hernia repair RT INGUINAL -2 AND LT INGUINAL -1    History of parathyroid surgery     S/P cataract surgery rt and lt

## 2019-09-24 NOTE — H&P PST ADULT - NSICDXPASTMEDICALHX_GEN_ALL_CORE_FT
PAST MEDICAL HISTORY:  Back pain     BPH (benign prostatic hyperplasia)     CAD (coronary artery disease) STENTS 2000 AND 2002    Diabetes     DVT (deep venous thrombosis) LEFT LE- 2008 AND 2018 AFTER INJURY    Esophagitis     GERD (gastroesophageal reflux disease)     High cholesterol     Hypertension complications     Hypothyroid     Myocardial infarction 2000    ETELVINA (obstructive sleep apnea) NOT USING cpap    Pain     Seizure last episode 2 yrs ago PAST MEDICAL HISTORY:  Afib sep 2019    Back pain     BPH (benign prostatic hyperplasia)     CAD (coronary artery disease) STENTS 2000 AND 2002    Diabetes     DVT (deep venous thrombosis) LEFT LE- 2008 AND 2018 AFTER INJURY    Esophagitis     GERD (gastroesophageal reflux disease)     High cholesterol     Hypertension complications     Hypothyroid     Myocardial infarction 2000    ETELVINA (obstructive sleep apnea) NOT USING cpap    Pain     Seizure last episode 2 yrs ago

## 2019-09-24 NOTE — H&P PST ADULT - HISTORY OF PRESENT ILLNESS
74yr old male states was in the ER 9/14 FOR RT FLANK PAIN -WAS FOUND TO HAVE RT KIDNEY STONE -PRESENTS TO PRETESTING FOR RT ESWL. All labs wnl. Was found to be in AFIB-NOW ON ELIQUIS.  denies any change in health status since the ER visit. Denies c/o CP, PALP, SOB, URI, FEVER RASH OR UTI SYMPTOMS. Exercise anjana 1-2 blocks LTD by back pain.

## 2019-10-01 PROBLEM — I21.9 ACUTE MYOCARDIAL INFARCTION, UNSPECIFIED: Chronic | Status: ACTIVE | Noted: 2019-09-24

## 2019-10-03 ENCOUNTER — APPOINTMENT (OUTPATIENT)
Dept: CARDIOLOGY | Facility: CLINIC | Age: 74
End: 2019-10-03
Payer: MEDICARE

## 2019-10-03 PROCEDURE — 99214 OFFICE O/P EST MOD 30 MIN: CPT

## 2019-10-03 PROCEDURE — 93000 ELECTROCARDIOGRAM COMPLETE: CPT

## 2019-10-16 ENCOUNTER — OUTPATIENT (OUTPATIENT)
Dept: OUTPATIENT SERVICES | Facility: HOSPITAL | Age: 74
LOS: 1 days | Discharge: HOME | End: 2019-10-16

## 2019-10-16 DIAGNOSIS — I25.10 ATHEROSCLEROTIC HEART DISEASE OF NATIVE CORONARY ARTERY WITHOUT ANGINA PECTORIS: Chronic | ICD-10-CM

## 2019-10-16 DIAGNOSIS — Z98.890 OTHER SPECIFIED POSTPROCEDURAL STATES: Chronic | ICD-10-CM

## 2019-10-16 DIAGNOSIS — Z79.899 OTHER LONG TERM (CURRENT) DRUG THERAPY: ICD-10-CM

## 2019-10-16 DIAGNOSIS — Z98.49 CATARACT EXTRACTION STATUS, UNSPECIFIED EYE: Chronic | ICD-10-CM

## 2019-10-17 ENCOUNTER — OUTPATIENT (OUTPATIENT)
Dept: OUTPATIENT SERVICES | Facility: HOSPITAL | Age: 74
LOS: 1 days | Discharge: HOME | End: 2019-10-17
Payer: MEDICARE

## 2019-10-17 DIAGNOSIS — Z98.890 OTHER SPECIFIED POSTPROCEDURAL STATES: Chronic | ICD-10-CM

## 2019-10-17 DIAGNOSIS — R07.9 CHEST PAIN, UNSPECIFIED: ICD-10-CM

## 2019-10-17 DIAGNOSIS — I25.10 ATHEROSCLEROTIC HEART DISEASE OF NATIVE CORONARY ARTERY WITHOUT ANGINA PECTORIS: Chronic | ICD-10-CM

## 2019-10-17 DIAGNOSIS — Z98.49 CATARACT EXTRACTION STATUS, UNSPECIFIED EYE: Chronic | ICD-10-CM

## 2019-10-17 PROCEDURE — 78452 HT MUSCLE IMAGE SPECT MULT: CPT | Mod: 26

## 2019-10-31 ENCOUNTER — APPOINTMENT (OUTPATIENT)
Dept: CARDIOLOGY | Facility: CLINIC | Age: 74
End: 2019-10-31
Payer: MEDICARE

## 2019-10-31 VITALS
SYSTOLIC BLOOD PRESSURE: 144 MMHG | DIASTOLIC BLOOD PRESSURE: 84 MMHG | BODY MASS INDEX: 39.61 KG/M2 | HEART RATE: 72 BPM | WEIGHT: 284 LBS

## 2019-10-31 PROCEDURE — 93000 ELECTROCARDIOGRAM COMPLETE: CPT

## 2019-10-31 PROCEDURE — 99214 OFFICE O/P EST MOD 30 MIN: CPT

## 2019-10-31 NOTE — PHYSICAL EXAM
[Normal Conjunctiva] : the conjunctiva exhibited no abnormalities [Eyelids - No Xanthelasma] : the eyelids demonstrated no xanthelasmas [Normal Oral Mucosa] : normal oral mucosa [No Oral Pallor] : no oral pallor [No Oral Cyanosis] : no oral cyanosis [Respiration, Rhythm And Depth] : normal respiratory rhythm and effort [Exaggerated Use Of Accessory Muscles For Inspiration] : no accessory muscle use [Auscultation Breath Sounds / Voice Sounds] : lungs were clear to auscultation bilaterally [Heart Rate And Rhythm] : heart rate and rhythm were normal [Heart Sounds] : normal S1 and S2 [Murmurs] : no murmurs present [Abdomen Soft] : soft [Abdomen Tenderness] : non-tender [Abdomen Mass (___ Cm)] : no abdominal mass palpated [Abnormal Walk] : normal gait [Gait - Sufficient For Exercise Testing] : the gait was sufficient for exercise testing [Nail Clubbing] : no clubbing of the fingernails [Cyanosis, Localized] : no localized cyanosis [Petechial Hemorrhages (___cm)] : no petechial hemorrhages [Skin Color & Pigmentation] : normal skin color and pigmentation [] : no rash [No Venous Stasis] : no venous stasis [Skin Lesions] : no skin lesions [No Skin Ulcers] : no skin ulcer [No Xanthoma] : no  xanthoma was observed

## 2019-11-04 ENCOUNTER — OUTPATIENT (OUTPATIENT)
Dept: OUTPATIENT SERVICES | Facility: HOSPITAL | Age: 74
LOS: 1 days | Discharge: HOME | End: 2019-11-04
Payer: MEDICARE

## 2019-11-04 VITALS
TEMPERATURE: 98 F | DIASTOLIC BLOOD PRESSURE: 88 MMHG | SYSTOLIC BLOOD PRESSURE: 148 MMHG | HEIGHT: 71 IN | OXYGEN SATURATION: 98 % | RESPIRATION RATE: 20 BRPM | HEART RATE: 60 BPM | WEIGHT: 279.99 LBS

## 2019-11-04 DIAGNOSIS — Z98.890 OTHER SPECIFIED POSTPROCEDURAL STATES: Chronic | ICD-10-CM

## 2019-11-04 DIAGNOSIS — I25.10 ATHEROSCLEROTIC HEART DISEASE OF NATIVE CORONARY ARTERY WITHOUT ANGINA PECTORIS: Chronic | ICD-10-CM

## 2019-11-04 DIAGNOSIS — N20.1 CALCULUS OF URETER: ICD-10-CM

## 2019-11-04 DIAGNOSIS — Z98.49 CATARACT EXTRACTION STATUS, UNSPECIFIED EYE: Chronic | ICD-10-CM

## 2019-11-04 DIAGNOSIS — Z01.818 ENCOUNTER FOR OTHER PREPROCEDURAL EXAMINATION: ICD-10-CM

## 2019-11-04 LAB
ALBUMIN SERPL ELPH-MCNC: 4.5 G/DL — SIGNIFICANT CHANGE UP (ref 3.5–5.2)
ALP SERPL-CCNC: 64 U/L — SIGNIFICANT CHANGE UP (ref 30–115)
ALT FLD-CCNC: 24 U/L — SIGNIFICANT CHANGE UP (ref 0–41)
ANION GAP SERPL CALC-SCNC: 17 MMOL/L — HIGH (ref 7–14)
APPEARANCE UR: CLEAR — SIGNIFICANT CHANGE UP
APTT BLD: 36.3 SEC — SIGNIFICANT CHANGE UP (ref 27–39.2)
AST SERPL-CCNC: 21 U/L — SIGNIFICANT CHANGE UP (ref 0–41)
BACTERIA # UR AUTO: NEGATIVE — SIGNIFICANT CHANGE UP
BASOPHILS # BLD AUTO: 0.02 K/UL — SIGNIFICANT CHANGE UP (ref 0–0.2)
BASOPHILS NFR BLD AUTO: 0.4 % — SIGNIFICANT CHANGE UP (ref 0–1)
BILIRUB SERPL-MCNC: 0.4 MG/DL — SIGNIFICANT CHANGE UP (ref 0.2–1.2)
BILIRUB UR-MCNC: NEGATIVE — SIGNIFICANT CHANGE UP
BUN SERPL-MCNC: 17 MG/DL — SIGNIFICANT CHANGE UP (ref 10–20)
CALCIUM SERPL-MCNC: 9.7 MG/DL — SIGNIFICANT CHANGE UP (ref 8.5–10.1)
CHLORIDE SERPL-SCNC: 101 MMOL/L — SIGNIFICANT CHANGE UP (ref 98–110)
CO2 SERPL-SCNC: 23 MMOL/L — SIGNIFICANT CHANGE UP (ref 17–32)
COLOR SPEC: SIGNIFICANT CHANGE UP
CREAT SERPL-MCNC: 0.9 MG/DL — SIGNIFICANT CHANGE UP (ref 0.7–1.5)
DIFF PNL FLD: ABNORMAL
EOSINOPHIL # BLD AUTO: 0.07 K/UL — SIGNIFICANT CHANGE UP (ref 0–0.7)
EOSINOPHIL NFR BLD AUTO: 1.4 % — SIGNIFICANT CHANGE UP (ref 0–8)
EPI CELLS # UR: 0 /HPF — SIGNIFICANT CHANGE UP (ref 0–5)
GLUCOSE SERPL-MCNC: 129 MG/DL — HIGH (ref 70–99)
GLUCOSE UR QL: NEGATIVE — SIGNIFICANT CHANGE UP
HCT VFR BLD CALC: 39.5 % — LOW (ref 42–52)
HGB BLD-MCNC: 13.2 G/DL — LOW (ref 14–18)
HYALINE CASTS # UR AUTO: 0 /LPF — SIGNIFICANT CHANGE UP (ref 0–7)
IMM GRANULOCYTES NFR BLD AUTO: 0.2 % — SIGNIFICANT CHANGE UP (ref 0.1–0.3)
INR BLD: 1.33 RATIO — HIGH (ref 0.65–1.3)
KETONES UR-MCNC: NEGATIVE — SIGNIFICANT CHANGE UP
LEUKOCYTE ESTERASE UR-ACNC: NEGATIVE — SIGNIFICANT CHANGE UP
LYMPHOCYTES # BLD AUTO: 1.6 K/UL — SIGNIFICANT CHANGE UP (ref 1.2–3.4)
LYMPHOCYTES # BLD AUTO: 33.1 % — SIGNIFICANT CHANGE UP (ref 20.5–51.1)
MCHC RBC-ENTMCNC: 29.7 PG — SIGNIFICANT CHANGE UP (ref 27–31)
MCHC RBC-ENTMCNC: 33.4 G/DL — SIGNIFICANT CHANGE UP (ref 32–37)
MCV RBC AUTO: 88.8 FL — SIGNIFICANT CHANGE UP (ref 80–94)
MONOCYTES # BLD AUTO: 0.42 K/UL — SIGNIFICANT CHANGE UP (ref 0.1–0.6)
MONOCYTES NFR BLD AUTO: 8.7 % — SIGNIFICANT CHANGE UP (ref 1.7–9.3)
NEUTROPHILS # BLD AUTO: 2.71 K/UL — SIGNIFICANT CHANGE UP (ref 1.4–6.5)
NEUTROPHILS NFR BLD AUTO: 56.2 % — SIGNIFICANT CHANGE UP (ref 42.2–75.2)
NITRITE UR-MCNC: NEGATIVE — SIGNIFICANT CHANGE UP
NRBC # BLD: 0 /100 WBCS — SIGNIFICANT CHANGE UP (ref 0–0)
PH UR: 7 — SIGNIFICANT CHANGE UP (ref 5–8)
PLATELET # BLD AUTO: 140 K/UL — SIGNIFICANT CHANGE UP (ref 130–400)
POTASSIUM SERPL-MCNC: 4.5 MMOL/L — SIGNIFICANT CHANGE UP (ref 3.5–5)
POTASSIUM SERPL-SCNC: 4.5 MMOL/L — SIGNIFICANT CHANGE UP (ref 3.5–5)
PROT SERPL-MCNC: 6.9 G/DL — SIGNIFICANT CHANGE UP (ref 6–8)
PROT UR-MCNC: ABNORMAL
PROTHROM AB SERPL-ACNC: 15.3 SEC — HIGH (ref 9.95–12.87)
RBC # BLD: 4.45 M/UL — LOW (ref 4.7–6.1)
RBC # FLD: 13.3 % — SIGNIFICANT CHANGE UP (ref 11.5–14.5)
RBC CASTS # UR COMP ASSIST: 36 /HPF — HIGH (ref 0–4)
SODIUM SERPL-SCNC: 141 MMOL/L — SIGNIFICANT CHANGE UP (ref 135–146)
SP GR SPEC: 1.01 — SIGNIFICANT CHANGE UP (ref 1.01–1.02)
UROBILINOGEN FLD QL: SIGNIFICANT CHANGE UP
WBC # BLD: 4.83 K/UL — SIGNIFICANT CHANGE UP (ref 4.8–10.8)
WBC # FLD AUTO: 4.83 K/UL — SIGNIFICANT CHANGE UP (ref 4.8–10.8)
WBC UR QL: 1 /HPF — SIGNIFICANT CHANGE UP (ref 0–5)

## 2019-11-04 PROCEDURE — 71046 X-RAY EXAM CHEST 2 VIEWS: CPT | Mod: 26

## 2019-11-04 PROCEDURE — 93010 ELECTROCARDIOGRAM REPORT: CPT

## 2019-11-04 RX ORDER — EZETIMIBE AND SIMVASTATIN 10; 80 MG/1; MG/1
1 TABLET, FILM COATED ORAL
Qty: 0 | Refills: 0 | DISCHARGE

## 2019-11-04 RX ORDER — METOPROLOL TARTRATE 50 MG
1 TABLET ORAL
Qty: 0 | Refills: 0 | DISCHARGE

## 2019-11-04 NOTE — H&P PST ADULT - NSICDXPASTSURGICALHX_GEN_ALL_CORE_FT
PAST SURGICAL HISTORY:  CAD (coronary artery disease) stents (4)    H/O arthroscopy of knee RT -2 AND LT -1    H/O arthroscopy of shoulder RT ROTATOR CUFF 2019    H/O hernia repair RT INGUINAL -2 AND LT INGUINAL -1    History of parathyroid surgery     S/P cataract surgery rt and lt

## 2019-11-04 NOTE — H&P PST ADULT - HISTORY OF PRESENT ILLNESS
75 y/o male, h/o right ureteral calculus; elected for procedure. Patient reports he said surgery was postponed for cardiac w/u; has Stress test 10/17/19.  Denies chest pain, palp, cough, fever, recent URI / UTI, dizziness or syncope; admits to STRINGER after 2 blks / 1 FOS.      Nuclear Stress (10/17/19)  Impression:  1. IV Adenosine Dual Isotope Study which was negative with respect to   symptoms and EKG changes.  2. Myocardial perfusion imaging reveals infarction in the distribution of   the left circumflex coronary artery as described above associated with   persistent dilatation of left ventricle  3. Gated imaging reveals severe hypokinesis and decreased thickening of   the inferolateral wall with ejection fraction moderately reduced at 40-45%  Recommendation:  ** Medical therapy.

## 2019-11-04 NOTE — H&P PST ADULT - NSICDXPASTMEDICALHX_GEN_ALL_CORE_FT
PAST MEDICAL HISTORY:  Afib Sep 2019; on Xarelto    Back pain chronic    BPH (benign prostatic hyperplasia)     CAD (coronary artery disease) STENTS 2000 AND 2002 (4 stents)    DM (diabetes mellitus)     Dry eyes     DVT (deep venous thrombosis) LEFT LE- 2008 AND 2018 AFTER INJURY    Esophagitis     GERD (gastroesophageal reflux disease)     High cholesterol     HTN (hypertension)     Hypertension complications     Hypothyroid     Myocardial infarction 2000    Obesity     ETELVINA (obstructive sleep apnea) NOT USING cpap    Pain     Seizure last episode 2 yrs ago    Sleep apnea Dx 10 yrs ago; denies CPAP use

## 2019-11-05 LAB
CULTURE RESULTS: NO GROWTH — SIGNIFICANT CHANGE UP
ESTIMATED AVERAGE GLUCOSE: 140 MG/DL — HIGH (ref 68–114)
HBA1C BLD-MCNC: 6.5 % — HIGH (ref 4–5.6)
SPECIMEN SOURCE: SIGNIFICANT CHANGE UP

## 2019-11-12 ENCOUNTER — OUTPATIENT (OUTPATIENT)
Dept: OUTPATIENT SERVICES | Facility: HOSPITAL | Age: 74
LOS: 1 days | Discharge: HOME | End: 2019-11-12
Payer: MEDICARE

## 2019-11-12 VITALS
SYSTOLIC BLOOD PRESSURE: 153 MMHG | HEART RATE: 72 BPM | DIASTOLIC BLOOD PRESSURE: 93 MMHG | HEIGHT: 71 IN | WEIGHT: 279.99 LBS | TEMPERATURE: 98 F | RESPIRATION RATE: 15 BRPM | OXYGEN SATURATION: 98 %

## 2019-11-12 VITALS — RESPIRATION RATE: 18 BRPM | SYSTOLIC BLOOD PRESSURE: 140 MMHG | HEART RATE: 70 BPM | DIASTOLIC BLOOD PRESSURE: 70 MMHG

## 2019-11-12 DIAGNOSIS — I25.10 ATHEROSCLEROTIC HEART DISEASE OF NATIVE CORONARY ARTERY WITHOUT ANGINA PECTORIS: Chronic | ICD-10-CM

## 2019-11-12 DIAGNOSIS — Z98.890 OTHER SPECIFIED POSTPROCEDURAL STATES: Chronic | ICD-10-CM

## 2019-11-12 DIAGNOSIS — Z98.49 CATARACT EXTRACTION STATUS, UNSPECIFIED EYE: Chronic | ICD-10-CM

## 2019-11-12 DIAGNOSIS — N20.1 CALCULUS OF URETER: ICD-10-CM

## 2019-11-12 LAB — GLUCOSE BLDC GLUCOMTR-MCNC: 101 MG/DL — HIGH (ref 70–99)

## 2019-11-12 PROCEDURE — 74018 RADEX ABDOMEN 1 VIEW: CPT | Mod: 26

## 2019-11-12 RX ORDER — SODIUM CHLORIDE 9 MG/ML
1000 INJECTION, SOLUTION INTRAVENOUS
Refills: 0 | Status: DISCONTINUED | OUTPATIENT
Start: 2019-11-12 | End: 2019-11-28

## 2019-11-12 RX ORDER — ONDANSETRON 8 MG/1
4 TABLET, FILM COATED ORAL ONCE
Refills: 0 | Status: DISCONTINUED | OUTPATIENT
Start: 2019-11-12 | End: 2019-11-28

## 2019-11-12 RX ORDER — OXYCODONE AND ACETAMINOPHEN 5; 325 MG/1; MG/1
1 TABLET ORAL ONCE
Refills: 0 | Status: DISCONTINUED | OUTPATIENT
Start: 2019-11-12 | End: 2019-11-12

## 2019-11-12 RX ORDER — TETRAHYDROZOLINE/POLYETHYL GLY 0.05 %-1 %
1 DROPS OPHTHALMIC (EYE)
Qty: 0 | Refills: 0 | DISCHARGE

## 2019-11-12 RX ORDER — MORPHINE SULFATE 50 MG/1
2 CAPSULE, EXTENDED RELEASE ORAL
Refills: 0 | Status: DISCONTINUED | OUTPATIENT
Start: 2019-11-12 | End: 2019-11-12

## 2019-11-12 RX ORDER — HYDROMORPHONE HYDROCHLORIDE 2 MG/ML
0.5 INJECTION INTRAMUSCULAR; INTRAVENOUS; SUBCUTANEOUS
Refills: 0 | Status: DISCONTINUED | OUTPATIENT
Start: 2019-11-12 | End: 2019-11-12

## 2019-11-12 RX ORDER — TRAMADOL HYDROCHLORIDE 50 MG/1
0 TABLET ORAL
Qty: 0 | Refills: 0 | DISCHARGE

## 2019-11-12 NOTE — ASU PATIENT PROFILE, ADULT - PMH
Afib  Sep 2019; on Xarelto  Back pain  chronic  BPH (benign prostatic hyperplasia)    CAD (coronary artery disease)  STENTS 2000 AND 2002 (4 stents)  DM (diabetes mellitus)    Dry eyes    DVT (deep venous thrombosis)  LEFT LE- 2008 AND 2018 AFTER INJURY  Esophagitis    GERD (gastroesophageal reflux disease)    High cholesterol    HTN (hypertension)    Hypertension complications    Hypothyroid    Myocardial infarction  2000  Obesity    ETELVINA (obstructive sleep apnea)  NOT USING cpap  Pain    Seizure  last episode 2 yrs ago  Sleep apnea  Dx 10 yrs ago; denies CPAP use

## 2019-11-12 NOTE — ASU PATIENT PROFILE, ADULT - PSH
CAD (coronary artery disease)  stents (4)  H/O arthroscopy of knee  RT -2 AND LT -1  H/O arthroscopy of shoulder  RT ROTATOR CUFF 2019  H/O hernia repair  RT INGUINAL -2 AND LT INGUINAL -1  History of parathyroid surgery    S/P cataract surgery  rt and lt

## 2019-11-17 DIAGNOSIS — N20.1 CALCULUS OF URETER: ICD-10-CM

## 2019-11-17 DIAGNOSIS — Z88.1 ALLERGY STATUS TO OTHER ANTIBIOTIC AGENTS STATUS: ICD-10-CM

## 2019-11-21 ENCOUNTER — EMERGENCY (EMERGENCY)
Facility: HOSPITAL | Age: 74
LOS: 0 days | Discharge: HOME | End: 2019-11-21
Attending: EMERGENCY MEDICINE | Admitting: EMERGENCY MEDICINE
Payer: MEDICARE

## 2019-11-21 VITALS
WEIGHT: 279.99 LBS | TEMPERATURE: 97 F | OXYGEN SATURATION: 98 % | HEART RATE: 86 BPM | SYSTOLIC BLOOD PRESSURE: 137 MMHG | DIASTOLIC BLOOD PRESSURE: 87 MMHG | HEIGHT: 71 IN | RESPIRATION RATE: 20 BRPM

## 2019-11-21 DIAGNOSIS — I25.10 ATHEROSCLEROTIC HEART DISEASE OF NATIVE CORONARY ARTERY WITHOUT ANGINA PECTORIS: Chronic | ICD-10-CM

## 2019-11-21 DIAGNOSIS — I10 ESSENTIAL (PRIMARY) HYPERTENSION: ICD-10-CM

## 2019-11-21 DIAGNOSIS — Z98.890 OTHER SPECIFIED POSTPROCEDURAL STATES: Chronic | ICD-10-CM

## 2019-11-21 DIAGNOSIS — M54.9 DORSALGIA, UNSPECIFIED: ICD-10-CM

## 2019-11-21 DIAGNOSIS — M54.5 LOW BACK PAIN: ICD-10-CM

## 2019-11-21 DIAGNOSIS — Z98.49 CATARACT EXTRACTION STATUS, UNSPECIFIED EYE: Chronic | ICD-10-CM

## 2019-11-21 DIAGNOSIS — Z88.1 ALLERGY STATUS TO OTHER ANTIBIOTIC AGENTS STATUS: ICD-10-CM

## 2019-11-21 DIAGNOSIS — Z98.890 OTHER SPECIFIED POSTPROCEDURAL STATES: ICD-10-CM

## 2019-11-21 PROBLEM — I48.91 UNSPECIFIED ATRIAL FIBRILLATION: Chronic | Status: ACTIVE | Noted: 2019-09-24

## 2019-11-21 PROBLEM — E66.9 OBESITY, UNSPECIFIED: Chronic | Status: ACTIVE | Noted: 2019-11-04

## 2019-11-21 PROBLEM — H04.123 DRY EYE SYNDROME OF BILATERAL LACRIMAL GLANDS: Chronic | Status: ACTIVE | Noted: 2019-11-04

## 2019-11-21 PROBLEM — G47.30 SLEEP APNEA, UNSPECIFIED: Chronic | Status: ACTIVE | Noted: 2019-11-04

## 2019-11-21 PROBLEM — E11.9 TYPE 2 DIABETES MELLITUS WITHOUT COMPLICATIONS: Chronic | Status: ACTIVE | Noted: 2019-11-04

## 2019-11-21 PROCEDURE — 99284 EMERGENCY DEPT VISIT MOD MDM: CPT | Mod: GC

## 2019-11-21 RX ORDER — OXYCODONE AND ACETAMINOPHEN 5; 325 MG/1; MG/1
2 TABLET ORAL ONCE
Refills: 0 | Status: DISCONTINUED | OUTPATIENT
Start: 2019-11-21 | End: 2019-11-21

## 2019-11-21 RX ADMIN — OXYCODONE AND ACETAMINOPHEN 2 TABLET(S): 5; 325 TABLET ORAL at 06:21

## 2019-11-21 NOTE — ED PROVIDER NOTE - PROGRESS NOTE DETAILS
Patient re-evaluated. Feeling much better after Percocet. Patient has leftover Percocet from his kidney stone. Encouraged close outpatient f/u with his PCP. Full DC instructions and precaution signs and symptoms discussed. Proper follow up ensured.  Medications administered and prescribed/OTC home meds discussed.  All questions and concerns from patient addressed.  Understanding of instructions verbalized.

## 2019-11-21 NOTE — ED PROVIDER NOTE - ATTENDING CONTRIBUTION TO CARE
I personally evaluated the patient. I reviewed the Resident’s or Physician Assistant’s note (as assigned above), and agree with the findings and plan except as documented in my note.  74yM cad stent,  afib - on Xarelto,  kidney stones , back pain ,  pw  low back pain  x 5 days -  triggered by  sitting in new chair for hours while hunting.  no fever, no trauma, no weakness, no bowel or bladder incontinence no neuro symptoms,  ambulating at home.  unable to describe quality of pain but worse with moving , position changes.    Pt with recent  xray back  11/12/19 - scoliosis,   and  multiple cts a/p with Iv contrast - most recent 9/14/19 -  normal caliber aorta .  PE alert nontoxic cvs irregular resp cta ab soft nontender  reducible ventral hernia, no pulsatile mass,  2+ radial pulse bl equal  5/5 strength LE sensation intact  no saddle anesthesia,  + dorsiflexion b/l great toe.

## 2019-11-21 NOTE — ED PROVIDER NOTE - NSFOLLOWUPCLINICS_GEN_ALL_ED_FT
[facility] ([facility])  Rehabilitation  [address] [place] [address]  Phone: [phone]  Fax:   Follow Up Time:

## 2019-11-21 NOTE — ED PROVIDER NOTE - PATIENT PORTAL LINK FT
You can access the FollowMyHealth Patient Portal offered by [facility] by registering at the following website: [email or web address]. By joining [facility]’s FollowMyHealth portal, you will also be able to view your health information using other applications (apps) compatible with our system.

## 2019-11-21 NOTE — ED PROVIDER NOTE - NSFOLLOWUPINSTRUCTIONS_ED_ALL_ED_FT
Please follow-up in 1 day with your primary care doctor and the rehab clinic provided.    Back Pain  Back pain is very common in adults. The cause of back pain is rarely dangerous and the pain often gets better over time. The cause of your back pain may not be known and may include strain of muscles or ligaments, degeneration of the spinal disks, or arthritis. Occasionally the pain may radiate down your leg(s). Over-the-counter medicines to reduce pain and inflammation are often the most helpful. Stretching and remaining active frequently helps the healing process.     SEEK IMMEDIATE MEDICAL CARE IF YOU HAVE ANY OF THE FOLLOWING SYMPTOMS: bowel or bladder control problems, unusual weakness or numbness in your arms or legs, nausea or vomiting, abdominal pain, fever, dizziness/lightheadedness.

## 2019-11-21 NOTE — ED PROVIDER NOTE - NS ED ROS FT
Constitutional:  No fevers or chills.  Eyes:  No visual changes.  ENT:  No sore throat.  Neck:  No neck pain.  Cardiac:  No CP or edema.  Resp:  No cough or SOB.  GI:  No nausea, vomiting, diarrhea, or abdominal pain.  :  No dysuria, frequency, or hematuria.  MSK:  +Low back pain.  Neuro:  No headache, dizziness, or weakness.  Skin:  No skin rash.

## 2019-11-21 NOTE — ED PROVIDER NOTE - PHYSICAL EXAMINATION
PHYSICAL EXAM: I have reviewed current vital signs.  GENERAL: NAD, elderly.  HEAD:  Normocephalic, atraumatic.  EYES: Conjunctiva and sclera clear.  ENT: MMM, no erythema/exudates.  NECK: Supple, no midline TTP.  CHEST/LUNG: Clear to auscultation bilaterally; no wheezes, rales, or rhonchi.  HEART: Regular rate and rhythm, normal S1 and S2; no murmurs, rubs, or gallops.  ABDOMEN: Soft, nontender, nondistended. Obese. Ventral hernia.  EXTREMITIES:  2+ peripheral pulses; no edema, FROM.  MSK: No spinal midline TTP. Back atraumatic.  PSYCH: Cooperative, appropriate, normal mood and affect.  NEUROLOGY: A&O x 3. Motor 5/5. No focal neurological deficits. Sensation intact bilaterally.  SKIN: Warm and dry.

## 2019-11-21 NOTE — ED PROVIDER NOTE - CLINICAL SUMMARY MEDICAL DECISION MAKING FREE TEXT BOX
Pt  pw  low back tyrell  after sitting in chair for hours Back pain most consistent with musculoskeletal pain, no fever, , no trauma, no weakness, no bowel or bladder incontinence  analgesia given pt feels much better     dcd stable well  appearing condition  outpt rehab and  return to ed instructions discussed Patient to be discharged from ED. Any available test results were discussed with and printed  for patient.  Verbal instructions given, including instructions to return to ED immediately for any new, worsening, or concerning symptoms. Patient reports understanding of above with capacity and insight. Written discharge instructions additionally given, including follow-up plan.

## 2019-11-21 NOTE — ED PROVIDER NOTE - OBJECTIVE STATEMENT
73 yo M hx of afib (on Xarelto), chronic back pain, BPH, CAD with stents, HTN, DM, HLD, DVT, and kidney stones s/p right ureter stone removal 11/10/19 (Dr. Saxena) presenting to ED with low lumbar back pain x 5-6 days. Describes the pain as moderate, waxing and waning, intermittent/episodic lasting hrs, non-radiating, exacerbated by exertion, and alleviated by rest. Has been taking Tramadol as needed for BP. States he has chronic lumbar back pain and a hx of scoliosis. Recently went hunting over this past weekend and states he was sitting in a hunting chair for 3 days straight. Denies any f/c, CP, SOB, abd pain, testicular pain, urinary sxs, n/v/d, urinary/fecal incontinence, numbness/tingling, or extremity pain. Able to ambulate.      ETELVINA (obstructive sleep apnea)  NOT USING cpap  Pain    Seizure  last episode 2 yrs ago  Sleep apnea  Dx 10 yrs ago; denies CPAP use

## 2019-11-29 ENCOUNTER — OUTPATIENT (OUTPATIENT)
Dept: OUTPATIENT SERVICES | Facility: HOSPITAL | Age: 74
LOS: 1 days | Discharge: HOME | End: 2019-11-29

## 2019-11-29 DIAGNOSIS — Z79.899 OTHER LONG TERM (CURRENT) DRUG THERAPY: ICD-10-CM

## 2019-11-29 DIAGNOSIS — E11.9 TYPE 2 DIABETES MELLITUS WITHOUT COMPLICATIONS: ICD-10-CM

## 2019-11-29 DIAGNOSIS — Z98.890 OTHER SPECIFIED POSTPROCEDURAL STATES: Chronic | ICD-10-CM

## 2019-11-29 DIAGNOSIS — I25.10 ATHEROSCLEROTIC HEART DISEASE OF NATIVE CORONARY ARTERY WITHOUT ANGINA PECTORIS: ICD-10-CM

## 2019-11-29 DIAGNOSIS — I10 ESSENTIAL (PRIMARY) HYPERTENSION: ICD-10-CM

## 2019-11-29 DIAGNOSIS — Z98.49 CATARACT EXTRACTION STATUS, UNSPECIFIED EYE: Chronic | ICD-10-CM

## 2019-11-29 DIAGNOSIS — I25.10 ATHEROSCLEROTIC HEART DISEASE OF NATIVE CORONARY ARTERY WITHOUT ANGINA PECTORIS: Chronic | ICD-10-CM

## 2019-11-29 DIAGNOSIS — E78.5 HYPERLIPIDEMIA, UNSPECIFIED: ICD-10-CM

## 2019-12-04 ENCOUNTER — OUTPATIENT (OUTPATIENT)
Dept: OUTPATIENT SERVICES | Facility: HOSPITAL | Age: 74
LOS: 1 days | Discharge: HOME | End: 2019-12-04
Payer: MEDICARE

## 2019-12-04 VITALS
WEIGHT: 285.06 LBS | DIASTOLIC BLOOD PRESSURE: 78 MMHG | SYSTOLIC BLOOD PRESSURE: 164 MMHG | RESPIRATION RATE: 16 BRPM | HEIGHT: 71 IN | TEMPERATURE: 98 F | OXYGEN SATURATION: 97 % | HEART RATE: 82 BPM

## 2019-12-04 DIAGNOSIS — Z98.890 OTHER SPECIFIED POSTPROCEDURAL STATES: Chronic | ICD-10-CM

## 2019-12-04 DIAGNOSIS — Z98.49 CATARACT EXTRACTION STATUS, UNSPECIFIED EYE: Chronic | ICD-10-CM

## 2019-12-04 DIAGNOSIS — N20.1 CALCULUS OF URETER: ICD-10-CM

## 2019-12-04 DIAGNOSIS — I25.10 ATHEROSCLEROTIC HEART DISEASE OF NATIVE CORONARY ARTERY WITHOUT ANGINA PECTORIS: Chronic | ICD-10-CM

## 2019-12-04 DIAGNOSIS — Z01.818 ENCOUNTER FOR OTHER PREPROCEDURAL EXAMINATION: ICD-10-CM

## 2019-12-04 DIAGNOSIS — N20.0 CALCULUS OF KIDNEY: Chronic | ICD-10-CM

## 2019-12-04 LAB
APPEARANCE UR: CLEAR — SIGNIFICANT CHANGE UP
BACTERIA # UR AUTO: NEGATIVE — SIGNIFICANT CHANGE UP
BILIRUB UR-MCNC: NEGATIVE — SIGNIFICANT CHANGE UP
COLOR SPEC: YELLOW — SIGNIFICANT CHANGE UP
DIFF PNL FLD: ABNORMAL
EPI CELLS # UR: 0 /HPF — SIGNIFICANT CHANGE UP (ref 0–5)
GLUCOSE UR QL: NEGATIVE — SIGNIFICANT CHANGE UP
HYALINE CASTS # UR AUTO: 0 /LPF — SIGNIFICANT CHANGE UP (ref 0–7)
KETONES UR-MCNC: NEGATIVE — SIGNIFICANT CHANGE UP
LEUKOCYTE ESTERASE UR-ACNC: NEGATIVE — SIGNIFICANT CHANGE UP
NITRITE UR-MCNC: NEGATIVE — SIGNIFICANT CHANGE UP
PH UR: 7 — SIGNIFICANT CHANGE UP (ref 5–8)
PROT UR-MCNC: ABNORMAL
RBC CASTS # UR COMP ASSIST: 83 /HPF — HIGH (ref 0–4)
SP GR SPEC: 1.02 — SIGNIFICANT CHANGE UP (ref 1.01–1.02)
UROBILINOGEN FLD QL: SIGNIFICANT CHANGE UP
WBC UR QL: 3 /HPF — SIGNIFICANT CHANGE UP (ref 0–5)

## 2019-12-04 PROCEDURE — 93010 ELECTROCARDIOGRAM REPORT: CPT

## 2019-12-04 NOTE — H&P PST ADULT - NSICDXPASTMEDICALHX_GEN_ALL_CORE_FT
PAST MEDICAL HISTORY:  Afib Sep 2019; on Xarelto    Back pain chronic    BPH (benign prostatic hyperplasia)     CAD (coronary artery disease) STENTS 2000 AND 2002 (4 stents)    DM (diabetes mellitus)     Dry eyes     DVT (deep venous thrombosis) LEFT LE- 2008 AND 2018 AFTER INJURY    Esophagitis     GERD (gastroesophageal reflux disease)     High cholesterol     HTN (hypertension)     Hypertension complications     Hypothyroid     Myocardial infarction 2000    Obesity     ETELVINA (obstructive sleep apnea) NOT USING cpap    Pain     Seizure last episode 2 yrs ago-REACTION TO CIPRO    Sleep apnea Dx 10 yrs ago; denies CPAP use

## 2019-12-04 NOTE — H&P PST ADULT - NSICDXPASTSURGICALHX_GEN_ALL_CORE_FT
PAST SURGICAL HISTORY:  CAD (coronary artery disease) stents (4)    H/O arthroscopy of knee RT -2 AND LT -1    H/O arthroscopy of shoulder RT ROTATOR CUFF 2019    H/O hernia repair RT INGUINAL -2 AND LT INGUINAL -1    History of parathyroid surgery     Kidney stone RT ESWL OCT 2019    S/P cataract surgery rt and lt

## 2019-12-04 NOTE — H&P PST ADULT - HISTORY OF PRESENT ILLNESS
74yr old male states " they did the procedure it didn't work , so he is going to try the procedure again, busting out the stones on my rt side" h/lo RT KIDNEY STONES-presents to pretesting for RT ESWL, Denies c/o CP, PALP URI, FEVER, RASH OR UTI SYMPTOMS. C/O SOB on lying down- " I took my daughter's  breathing medicine and felt much better for a long time, I could sleep, happening over the last 1 week". Exercise anjana 2 FOS.

## 2019-12-05 LAB
CULTURE RESULTS: NO GROWTH — SIGNIFICANT CHANGE UP
SPECIMEN SOURCE: SIGNIFICANT CHANGE UP

## 2019-12-09 ENCOUNTER — APPOINTMENT (OUTPATIENT)
Dept: CARDIOLOGY | Facility: CLINIC | Age: 74
End: 2019-12-09
Payer: MEDICARE

## 2019-12-09 PROCEDURE — 93306 TTE W/DOPPLER COMPLETE: CPT

## 2019-12-10 ENCOUNTER — APPOINTMENT (OUTPATIENT)
Dept: CARDIOLOGY | Facility: CLINIC | Age: 74
End: 2019-12-10
Payer: MEDICARE

## 2019-12-10 PROCEDURE — 99213 OFFICE O/P EST LOW 20 MIN: CPT

## 2019-12-10 PROCEDURE — 93000 ELECTROCARDIOGRAM COMPLETE: CPT

## 2019-12-13 ENCOUNTER — LABORATORY RESULT (OUTPATIENT)
Age: 74
End: 2019-12-13

## 2019-12-13 ENCOUNTER — OUTPATIENT (OUTPATIENT)
Dept: OUTPATIENT SERVICES | Facility: HOSPITAL | Age: 74
LOS: 1 days | Discharge: HOME | End: 2019-12-13

## 2019-12-13 DIAGNOSIS — N20.0 CALCULUS OF KIDNEY: Chronic | ICD-10-CM

## 2019-12-13 DIAGNOSIS — I25.10 ATHEROSCLEROTIC HEART DISEASE OF NATIVE CORONARY ARTERY WITHOUT ANGINA PECTORIS: Chronic | ICD-10-CM

## 2019-12-13 DIAGNOSIS — Z01.810 ENCOUNTER FOR PREPROCEDURAL CARDIOVASCULAR EXAMINATION: ICD-10-CM

## 2019-12-13 DIAGNOSIS — Z98.890 OTHER SPECIFIED POSTPROCEDURAL STATES: Chronic | ICD-10-CM

## 2019-12-13 DIAGNOSIS — Z98.49 CATARACT EXTRACTION STATUS, UNSPECIFIED EYE: Chronic | ICD-10-CM

## 2019-12-13 DIAGNOSIS — E06.0 ACUTE THYROIDITIS: ICD-10-CM

## 2019-12-13 DIAGNOSIS — I25.10 ATHEROSCLEROTIC HEART DISEASE OF NATIVE CORONARY ARTERY WITHOUT ANGINA PECTORIS: ICD-10-CM

## 2019-12-13 DIAGNOSIS — Z79.01 LONG TERM (CURRENT) USE OF ANTICOAGULANTS: ICD-10-CM

## 2019-12-13 PROBLEM — R56.9 UNSPECIFIED CONVULSIONS: Chronic | Status: ACTIVE | Noted: 2019-02-17

## 2019-12-20 ENCOUNTER — OUTPATIENT (OUTPATIENT)
Dept: OUTPATIENT SERVICES | Facility: HOSPITAL | Age: 74
LOS: 1 days | Discharge: HOME | End: 2019-12-20
Payer: MEDICARE

## 2019-12-20 DIAGNOSIS — Z98.890 OTHER SPECIFIED POSTPROCEDURAL STATES: Chronic | ICD-10-CM

## 2019-12-20 DIAGNOSIS — I20.0 UNSTABLE ANGINA: ICD-10-CM

## 2019-12-20 DIAGNOSIS — I25.10 ATHEROSCLEROTIC HEART DISEASE OF NATIVE CORONARY ARTERY WITHOUT ANGINA PECTORIS: Chronic | ICD-10-CM

## 2019-12-20 DIAGNOSIS — I25.10 ATHEROSCLEROTIC HEART DISEASE OF NATIVE CORONARY ARTERY WITHOUT ANGINA PECTORIS: ICD-10-CM

## 2019-12-20 DIAGNOSIS — N20.0 CALCULUS OF KIDNEY: Chronic | ICD-10-CM

## 2019-12-20 DIAGNOSIS — Z98.49 CATARACT EXTRACTION STATUS, UNSPECIFIED EYE: Chronic | ICD-10-CM

## 2019-12-20 LAB
ANION GAP SERPL CALC-SCNC: 14 MMOL/L — SIGNIFICANT CHANGE UP (ref 7–14)
ANION GAP SERPL CALC-SCNC: 17 MMOL/L — HIGH (ref 7–14)
BUN SERPL-MCNC: 18 MG/DL — SIGNIFICANT CHANGE UP (ref 10–20)
BUN SERPL-MCNC: 18 MG/DL — SIGNIFICANT CHANGE UP (ref 10–20)
CALCIUM SERPL-MCNC: 9.1 MG/DL — SIGNIFICANT CHANGE UP (ref 8.5–10.1)
CALCIUM SERPL-MCNC: 9.4 MG/DL — SIGNIFICANT CHANGE UP (ref 8.5–10.1)
CHLORIDE SERPL-SCNC: 103 MMOL/L — SIGNIFICANT CHANGE UP (ref 98–110)
CHLORIDE SERPL-SCNC: 105 MMOL/L — SIGNIFICANT CHANGE UP (ref 98–110)
CO2 SERPL-SCNC: 21 MMOL/L — SIGNIFICANT CHANGE UP (ref 17–32)
CO2 SERPL-SCNC: 22 MMOL/L — SIGNIFICANT CHANGE UP (ref 17–32)
CREAT SERPL-MCNC: 0.9 MG/DL — SIGNIFICANT CHANGE UP (ref 0.7–1.5)
CREAT SERPL-MCNC: 0.9 MG/DL — SIGNIFICANT CHANGE UP (ref 0.7–1.5)
GLUCOSE SERPL-MCNC: 137 MG/DL — HIGH (ref 70–99)
GLUCOSE SERPL-MCNC: 180 MG/DL — HIGH (ref 70–99)
HCT VFR BLD CALC: 36.5 % — LOW (ref 42–52)
HCT VFR BLD CALC: 40.4 % — LOW (ref 42–52)
HGB BLD-MCNC: 12 G/DL — LOW (ref 14–18)
HGB BLD-MCNC: 13.1 G/DL — LOW (ref 14–18)
MCHC RBC-ENTMCNC: 29.4 PG — SIGNIFICANT CHANGE UP (ref 27–31)
MCHC RBC-ENTMCNC: 29.6 PG — SIGNIFICANT CHANGE UP (ref 27–31)
MCHC RBC-ENTMCNC: 32.4 G/DL — SIGNIFICANT CHANGE UP (ref 32–37)
MCHC RBC-ENTMCNC: 32.9 G/DL — SIGNIFICANT CHANGE UP (ref 32–37)
MCV RBC AUTO: 90.1 FL — SIGNIFICANT CHANGE UP (ref 80–94)
MCV RBC AUTO: 90.6 FL — SIGNIFICANT CHANGE UP (ref 80–94)
NRBC # BLD: 0 /100 WBCS — SIGNIFICANT CHANGE UP (ref 0–0)
NRBC # BLD: 0 /100 WBCS — SIGNIFICANT CHANGE UP (ref 0–0)
PLATELET # BLD AUTO: 129 K/UL — LOW (ref 130–400)
PLATELET # BLD AUTO: 136 K/UL — SIGNIFICANT CHANGE UP (ref 130–400)
POTASSIUM SERPL-MCNC: 4.1 MMOL/L — SIGNIFICANT CHANGE UP (ref 3.5–5)
POTASSIUM SERPL-MCNC: 4.6 MMOL/L — SIGNIFICANT CHANGE UP (ref 3.5–5)
POTASSIUM SERPL-SCNC: 4.1 MMOL/L — SIGNIFICANT CHANGE UP (ref 3.5–5)
POTASSIUM SERPL-SCNC: 4.6 MMOL/L — SIGNIFICANT CHANGE UP (ref 3.5–5)
RBC # BLD: 4.05 M/UL — LOW (ref 4.7–6.1)
RBC # BLD: 4.46 M/UL — LOW (ref 4.7–6.1)
RBC # FLD: 13.6 % — SIGNIFICANT CHANGE UP (ref 11.5–14.5)
RBC # FLD: 13.7 % — SIGNIFICANT CHANGE UP (ref 11.5–14.5)
SODIUM SERPL-SCNC: 140 MMOL/L — SIGNIFICANT CHANGE UP (ref 135–146)
SODIUM SERPL-SCNC: 142 MMOL/L — SIGNIFICANT CHANGE UP (ref 135–146)
WBC # BLD: 4.77 K/UL — LOW (ref 4.8–10.8)
WBC # BLD: 5.89 K/UL — SIGNIFICANT CHANGE UP (ref 4.8–10.8)
WBC # FLD AUTO: 4.77 K/UL — LOW (ref 4.8–10.8)
WBC # FLD AUTO: 5.89 K/UL — SIGNIFICANT CHANGE UP (ref 4.8–10.8)

## 2019-12-20 PROCEDURE — 93010 ELECTROCARDIOGRAM REPORT: CPT

## 2019-12-20 RX ORDER — ASPIRIN/CALCIUM CARB/MAGNESIUM 324 MG
1 TABLET ORAL
Qty: 30 | Refills: 0
Start: 2019-12-20 | End: 2020-01-18

## 2019-12-20 RX ORDER — LOSARTAN POTASSIUM 100 MG/1
1 TABLET, FILM COATED ORAL
Qty: 0 | Refills: 0 | DISCHARGE

## 2019-12-20 NOTE — H&P CARDIOLOGY - HISTORY OF PRESENT ILLNESS
HPI  74 yr old male with PMH of CAD s/p PCI several yrs ago, Afib, DM, HTN, DLD came here for elective coronary angiogram. Pt has worsening STRINGER recently.     REVIEW OF SYSTEMS:  CONSTITUTIONAL: No weakness, fevers or chills  EYES/ENT: No visual changes;  No vertigo or throat pain   NECK: No pain or stiffness  RESPIRATORY: No cough, wheezing, hemoptysis; SEE HPI  CARDIOVASCULAR: SEE HPI  GASTROINTESTINAL: No abdominal or epigastric pain. No nausea, vomiting, or hematemesis; No diarrhea or constipation. No melena or hematochezia.  GENITOURINARY: No dysuria, frequency or hematuria  NEUROLOGICAL: No numbness or weakness  SKIN: No itching, rashes      PHYSICAL EXAM:  T(C): --  HR: --  BP: --  RR: --  SpO2: --  GENERAL: NAD  HEAD:  Atraumatic, Normocephalic  EYES: conjunctiva and sclera clear  NECK: No JVD  CHEST/LUNG: Clear to auscultation bilaterally; No wheeze  HEART: Regular rate and rhythm; No murmurs  ABDOMEN: Soft, Nontender, Nondistended; Bowel sounds present  EXTREMITIES:  2+ Peripheral Pulses, No clubbing, cyanosis, or edema  NEUROLOGY:  A&Ox3, appropriate  SKIN: No rashes or lesions  MALACHI TEST: WNL

## 2019-12-20 NOTE — ASU PATIENT PROFILE, ADULT - PSH
CAD (coronary artery disease)  stents (4)  H/O arthroscopy of knee  RT -2 AND LT -1  H/O arthroscopy of shoulder  RT ROTATOR CUFF 2019  H/O hernia repair  RT INGUINAL -2 AND LT INGUINAL -1  History of parathyroid surgery    Kidney stone  RT ESWL OCT 2019  S/P cataract surgery  rt and lt

## 2019-12-20 NOTE — CHART NOTE - NSCHARTNOTEFT_GEN_A_CORE
Preliminary Cardiac Catheterization Post-Procedure Report    PRE-OP DIAGNOSIS: stable angina CCS3, abnormal stress test    PROCEDURE: Coronary angiogram, iFR, PCI    Physician: Dr. Borrego  Assistant: Dr. German García     ANESTHESIA TYPE:  [  ]General Anesthesia  [ x ] Sedation  [ x ] Local/Regional    ESTIMATED BLOOD LOSS:   < 10 mL    CONDITION  [  ] Critical  [  ] Serious  [  ]Fair  [ x ]Good    ACCESS & HEMOSTASIS  [ x ] Right radial  -> D stat  [  ] Right femoral  [  ] Left radial  [  ] Left femoral       FINDINGS    Hemodynamics: Hemodynamic assessment demonstrates moderately elevated LVEDP.     Ventricles: EF calculated by contrast ventriculography was 45 %.     Coronary circulation: The coronary circulation is right dominant. There was 3-vessel coronary artery disease. Distal left main: There was a discrete 30- 40 % stenosis. LAD: The vessel was medium sized. Proximal LAD: Angiography showed minor luminal irregularities with no flow limiting lesions. Mid LAD: There was a tubular 60 % stenosis. iFR was performed and the lesion was not significant (iFR- 0.94). Distal LAD: Angiography showed no evidence of disease. 1st diagonal: Angiography showed mild atherosclerosis with no flow limiting lesions. Proximal circumflex: There was a 100 % stenosis. This lesion is a chronic total occlusion. RCA: The vessel was large sized (dominant). Proximal RCA: There was a discrete 90 % stenosis at a site with no prior intervention. There was SHERLY grade 3 flow through the vessel (brisk flow). This lesion is a likely culprit for the patient's anginal symptoms. Intervention was attempted but was unsuccessful. Mid RCA: Angiography showed no evidence of disease. Distal RCA: Angiography showed minor luminal irregularities with no flow limiting lesions. Right PDA: Normal. Right posterolateral segment: Normal.       INTERVENTION/ IMPLANTS: CASTILLO x 2 to proximal RCA       POST-OP DIAGNOSIS     There is significant double vessel coronary artery disease (RCA and LCX) s/p successful PCI of proximal RCA.         PLAN OF CARE  [ x] D/C Home today  [ ] Admit for observation   [ ] Return to In-patient bed  [ ] Return for staged procedure  [ ] CT Surgery consult called  [ x] Continue medical therapy including DAPT

## 2019-12-20 NOTE — ASU PATIENT PROFILE, ADULT - PMH
Afib  Sep 2019; on Xarelto  Back pain  chronic  BPH (benign prostatic hyperplasia)    CAD (coronary artery disease)  STENTS 2000 AND 2002 (4 stents)  DM (diabetes mellitus)    Dry eyes    DVT (deep venous thrombosis)  LEFT LE- 2008 AND 2018 AFTER INJURY  Esophagitis    GERD (gastroesophageal reflux disease)    High cholesterol    HTN (hypertension)    Hypertension complications    Hypothyroid    Myocardial infarction  2000  Obesity    ETELVINA (obstructive sleep apnea)  NOT USING cpap  Pain    Seizure  last episode 2 yrs ago-REACTION TO CIPRO  Sleep apnea  Dx 10 yrs ago; denies CPAP use

## 2019-12-20 NOTE — PROGRESS NOTE ADULT - SUBJECTIVE AND OBJECTIVE BOX
Cardiology Follow up    WEEKS, EMILIE   74y Male  PAST MEDICAL & SURGICAL HISTORY:  Dry eyes  Sleep apnea: Dx 10 yrs ago; denies CPAP use  Obesity  HTN (hypertension)  DM (diabetes mellitus)  Afib: Sep 2019; on Xarelto  Myocardial infarction: 2000  BPH (benign prostatic hyperplasia)  Esophagitis  Back pain: chronic  Pain  Hypothyroid  High cholesterol  Seizure: last episode 2 yrs ago-REACTION TO CIPRO  DVT (deep venous thrombosis): LEFT LE- 2008 AND 2018 AFTER INJURY  CAD (coronary artery disease): STENTS 2000 AND 2002 (4 stents)  ETELVINA (obstructive sleep apnea): NOT USING cpap  GERD (gastroesophageal reflux disease)  Hypertension complications  Kidney stone: RT ESWL OCT 2019  S/P cataract surgery: rt and lt  H/O arthroscopy of shoulder: RT ROTATOR CUFF 2019  History of parathyroid surgery  CAD (coronary artery disease): stents (4)  H/O arthroscopy of knee: RT -2 AND LT -1  H/O hernia repair: RT INGUINAL -2 AND LT INGUINAL -1       HPI:  74 yr old male with PMH of CAD s/p PCI several yrs ago, Afib, DM, HTN, DLD came here for elective coronary angiogram. Pt has worsening STRINGER recently.   Allergies    Cipro (Other)    Intolerances    Patient without complaints.   Denies CP, SOB, palpitations, or dizziness    HR: 62  BP: 139/60  BP(mean): 103  RR: 12  SpO2: 98% on RA      REVIEW OF SYSTEMS:          CONSTITUTIONAL: No weakness, fevers or chills          EYES/ENT: No visual changes;  No vertigo or throat pain           NECK: No pain or stiffness          RESPIRATORY: No cough, wheezing, hemoptysis          CARDIOVASCULAR: no pain, no STRINGER, no palpitations           GASTROINTESTINAL: No abdominal or epigastric pain. No nausea, vomiting, or hematemesis;           GENITOURINARY: No dysuria, frequency or hematuria          NEUROLOGICAL: No numbness or weakness          SKIN: No itching, rashes    PHYSICAL EXAM:           CONSTITUTIONAL: Well-developed; well-nourished; in no acute distress  	SKIN: warm, dry  	HEAD: Normocephalic; atraumatic  	EYES: PERRL.  	ENT: No nasal discharge, airway clear, mucous membranes moist  	NECK: Supple; non tender.  	CARD: +S1, +S2, no murmurs, gallops, or rubs. Regular rate and rhythm    	RESP: No wheezes, rales or rhonchi. CTA B/L  	ABD: soft ntnd, + BS x 4 quadrants  	EXT: moves all extremities,  no clubbing, cyanosis or edema  	NEURO: Alert and oriented x3, no focal deficits          PSYCH: Cooperative, appropriate          VASCULAR:  +2 Rad / +2 PTs / + 2 DPs          EXTREMITY:              Right Radial: Dressing D/C/I, access site soft, no hematoma, no pain, + pulses/same as baseline, no sign of infection, no numbness            ECG:   P    LABS:                        13.1   5.89  )-----------( 136      ( 20 Dec 2019 07:29 )             40.4     12-20    142  |  103  |  18  ----------------------------<  137<H>  4.6   |  22  |  0.9    Ca    9.4      20 Dec 2019 07:29    A/P:  I discussed the case with Cardiologist Dr. Borrego and recommend the following:    S/P PCI:  CASTILLO x2                     Hold Metformin x 48 hr after Cardiac Cath  	     Continue DAPT ( Aspirin 81 mg PO Daily and Plavix 75 mg PO Daily ), B-Blocker, Statin Therapy, Xarelto, Arb                    Patient given 30 day supply of ( Aspirin 81 mg daily and Plavix 75 mg daily ) to take at home                   Patient agreeing to take DAPT for at least one year or as directed by cardiologist                    Pt given instructions on importance of taking antiplatelet medication or risk acute stent thrombosis/death                   Post cath instructions, access site care and activity restrictions reviewed with patient                     Discussed with patient to return to hospital if experience chest pain, shortness breath, dizziness and site bleeding                   Aggressive risk factor modification, diet counseling, smoking cessation discussed with patient                       Can discharge patient from cardiac standpoint  at 2 pm after ambulating without symptoms and access site wnl and ECG reviewed                    Follow up with Cardiology Dr. Borrego in one to two weeks.  Instructed to call and make an appointment

## 2020-01-02 DIAGNOSIS — Z88.1 ALLERGY STATUS TO OTHER ANTIBIOTIC AGENTS STATUS: ICD-10-CM

## 2020-01-02 DIAGNOSIS — E11.9 TYPE 2 DIABETES MELLITUS WITHOUT COMPLICATIONS: ICD-10-CM

## 2020-01-02 DIAGNOSIS — G47.33 OBSTRUCTIVE SLEEP APNEA (ADULT) (PEDIATRIC): ICD-10-CM

## 2020-01-02 DIAGNOSIS — I10 ESSENTIAL (PRIMARY) HYPERTENSION: ICD-10-CM

## 2020-01-02 DIAGNOSIS — D64.9 ANEMIA, UNSPECIFIED: ICD-10-CM

## 2020-01-02 DIAGNOSIS — I25.118 ATHEROSCLEROTIC HEART DISEASE OF NATIVE CORONARY ARTERY WITH OTHER FORMS OF ANGINA PECTORIS: ICD-10-CM

## 2020-01-02 DIAGNOSIS — Z95.5 PRESENCE OF CORONARY ANGIOPLASTY IMPLANT AND GRAFT: ICD-10-CM

## 2020-01-02 DIAGNOSIS — Z79.02 LONG TERM (CURRENT) USE OF ANTITHROMBOTICS/ANTIPLATELETS: ICD-10-CM

## 2020-01-02 DIAGNOSIS — I82.409 ACUTE EMBOLISM AND THROMBOSIS OF UNSPECIFIED DEEP VEINS OF UNSPECIFIED LOWER EXTREMITY: ICD-10-CM

## 2020-01-02 DIAGNOSIS — E78.00 PURE HYPERCHOLESTEROLEMIA, UNSPECIFIED: ICD-10-CM

## 2020-01-02 DIAGNOSIS — I48.91 UNSPECIFIED ATRIAL FIBRILLATION: ICD-10-CM

## 2020-01-02 DIAGNOSIS — E66.9 OBESITY, UNSPECIFIED: ICD-10-CM

## 2020-01-02 DIAGNOSIS — I20.8 OTHER FORMS OF ANGINA PECTORIS: ICD-10-CM

## 2020-01-07 ENCOUNTER — APPOINTMENT (OUTPATIENT)
Dept: CARDIOLOGY | Facility: CLINIC | Age: 75
End: 2020-01-07
Payer: MEDICARE

## 2020-01-07 PROCEDURE — 93000 ELECTROCARDIOGRAM COMPLETE: CPT

## 2020-01-07 PROCEDURE — 99214 OFFICE O/P EST MOD 30 MIN: CPT

## 2020-01-10 ENCOUNTER — OUTPATIENT (OUTPATIENT)
Dept: OUTPATIENT SERVICES | Facility: HOSPITAL | Age: 75
LOS: 1 days | Discharge: HOME | End: 2020-01-10
Payer: MEDICARE

## 2020-01-10 DIAGNOSIS — Z98.890 OTHER SPECIFIED POSTPROCEDURAL STATES: Chronic | ICD-10-CM

## 2020-01-10 DIAGNOSIS — Z98.49 CATARACT EXTRACTION STATUS, UNSPECIFIED EYE: Chronic | ICD-10-CM

## 2020-01-10 DIAGNOSIS — I25.10 ATHEROSCLEROTIC HEART DISEASE OF NATIVE CORONARY ARTERY WITHOUT ANGINA PECTORIS: Chronic | ICD-10-CM

## 2020-01-10 DIAGNOSIS — J84.112 IDIOPATHIC PULMONARY FIBROSIS: ICD-10-CM

## 2020-01-10 DIAGNOSIS — N20.0 CALCULUS OF KIDNEY: Chronic | ICD-10-CM

## 2020-01-10 PROCEDURE — 71250 CT THORAX DX C-: CPT | Mod: 26

## 2020-02-21 NOTE — H&P PST ADULT - WEIGHT IN KG
Pt has hx of breast ca stage IIb, finished chemo on tuesday ( four days ago) and has developed fever 122.5

## 2020-02-27 ENCOUNTER — APPOINTMENT (OUTPATIENT)
Dept: CARDIOLOGY | Facility: CLINIC | Age: 75
End: 2020-02-27
Payer: MEDICARE

## 2020-02-27 PROCEDURE — 99214 OFFICE O/P EST MOD 30 MIN: CPT

## 2020-02-27 PROCEDURE — 93000 ELECTROCARDIOGRAM COMPLETE: CPT

## 2020-02-28 ENCOUNTER — LABORATORY RESULT (OUTPATIENT)
Age: 75
End: 2020-02-28

## 2020-03-31 ENCOUNTER — APPOINTMENT (OUTPATIENT)
Dept: CARDIOLOGY | Facility: CLINIC | Age: 75
End: 2020-03-31
Payer: MEDICARE

## 2020-03-31 PROCEDURE — 99214 OFFICE O/P EST MOD 30 MIN: CPT

## 2020-03-31 PROCEDURE — 93000 ELECTROCARDIOGRAM COMPLETE: CPT

## 2020-04-30 ENCOUNTER — APPOINTMENT (OUTPATIENT)
Dept: CARDIOLOGY | Facility: CLINIC | Age: 75
End: 2020-04-30
Payer: MEDICARE

## 2020-04-30 PROCEDURE — 99442: CPT | Mod: CR

## 2020-04-30 NOTE — PHYSICAL EXAM
[Normal Conjunctiva] : the conjunctiva exhibited no abnormalities [Eyelids - No Xanthelasma] : the eyelids demonstrated no xanthelasmas [Normal Oral Mucosa] : normal oral mucosa [No Oral Cyanosis] : no oral cyanosis [No Oral Pallor] : no oral pallor [Abdomen Soft] : soft [Abdomen Tenderness] : non-tender [Abdomen Mass (___ Cm)] : no abdominal mass palpated [Nail Clubbing] : no clubbing of the fingernails [Cyanosis, Localized] : no localized cyanosis [Petechial Hemorrhages (___cm)] : no petechial hemorrhages [] : no ischemic changes

## 2020-04-30 NOTE — HISTORY OF PRESENT ILLNESS
[FreeTextEntry1] : \par \par Patient with history  DVT, ETELVINA dies not wear mask, HTN, DM, PCI, CAD, HLD seizures and kidney stone. Pt was in hospital bc of back pain nd pt found to have AF. CHADVASC 4. Patietn was in painand did not feel much palpitation. Patient had syncope two years a ago for unknown reason. Work up was negative. Patient denies CP, SOB, STRINGER, NV\par \par Patient denies palpitations or bleeding\par \par EKG SR 68 bpm normal intervals \par \par event monitor no AF

## 2020-04-30 NOTE — HISTORY OF PRESENT ILLNESS
[FreeTextEntry1] : Mr. EMILIE CORONA has given me verbal authorization to provide the tele services\par Verbal consent given on 04/30/2020  by the patient.\par \par This visit was provided via telehealth using real-time telephone technology. The patient,  Mr. EMILIE CORONA,   was located at home,  [address] [place] [address]\[place] [address], at the time of the visit. \par \par The patient, Mr. EMILIE CORONA  and Provider participated in the telehealth encounter. \par \par I have spent 11 minutes speaking with or face-to-face discussing\par \par \par \par \par Patient with history  DVT, ETELVINA dies not wear mask, HTN, DM, PCI, CAD, HLD seizures and kidney stone. Pt was in hospital bc of back pain nd pt found to have AF. CHADVASC 4. Patietn was in painand did not feel much palpitation. Patient had syncope two years a ago for unknown reason. Work up was negative. Patient denies CP, SOB, STRINGER, NV\par \par Patient denies palpitations or bleeding. However it does continues to complain some weakness and shorter breath. Last month patient so Dr. Rizzo and he was still in atrial fibrillation. His mentoprolol was increased.\par \par \par \par event monitor no AF

## 2020-04-30 NOTE — ASSESSMENT
[FreeTextEntry1] : new oset of PAF\par - CHADVASC 4 cont AC\par - event monitor to evaluate frequency - no AF\par - will cont to monitor \par -  FU with pulmonary for ETELVINA adjustment - I encouraged the patient to got to pulmonary

## 2020-04-30 NOTE — ASSESSMENT
[FreeTextEntry1] : new oset of PAF\par - CHADVASC 4 cont AC\par - Recommend cardioversion next month. I also discussed the patient possibility of ablation. If patient continued to be symptomatic and converts back to atrial fibrillation after cardioversion I will discuss with patient possibility of ablation.\par -  FU with pulmonary for ETELVINA adjustment - I encouraged the patient to got to pulmonary

## 2020-05-19 ENCOUNTER — LABORATORY RESULT (OUTPATIENT)
Age: 75
End: 2020-05-19

## 2020-05-19 ENCOUNTER — APPOINTMENT (OUTPATIENT)
Dept: CARDIOLOGY | Facility: CLINIC | Age: 75
End: 2020-05-19
Payer: MEDICARE

## 2020-05-19 PROCEDURE — 99214 OFFICE O/P EST MOD 30 MIN: CPT

## 2020-05-19 PROCEDURE — 93000 ELECTROCARDIOGRAM COMPLETE: CPT

## 2020-06-01 ENCOUNTER — APPOINTMENT (OUTPATIENT)
Dept: CARDIOLOGY | Facility: CLINIC | Age: 75
End: 2020-06-01

## 2020-06-04 ENCOUNTER — OUTPATIENT (OUTPATIENT)
Dept: OUTPATIENT SERVICES | Facility: HOSPITAL | Age: 75
LOS: 1 days | Discharge: HOME | End: 2020-06-04
Payer: MEDICARE

## 2020-06-04 VITALS — WEIGHT: 285.06 LBS | HEIGHT: 71 IN

## 2020-06-04 DIAGNOSIS — Z98.890 OTHER SPECIFIED POSTPROCEDURAL STATES: Chronic | ICD-10-CM

## 2020-06-04 DIAGNOSIS — Z98.49 CATARACT EXTRACTION STATUS, UNSPECIFIED EYE: Chronic | ICD-10-CM

## 2020-06-04 DIAGNOSIS — I48.0 PAROXYSMAL ATRIAL FIBRILLATION: ICD-10-CM

## 2020-06-04 DIAGNOSIS — N20.0 CALCULUS OF KIDNEY: Chronic | ICD-10-CM

## 2020-06-04 DIAGNOSIS — I25.10 ATHEROSCLEROTIC HEART DISEASE OF NATIVE CORONARY ARTERY WITHOUT ANGINA PECTORIS: Chronic | ICD-10-CM

## 2020-06-04 LAB — GLUCOSE BLDC GLUCOMTR-MCNC: 136 MG/DL — HIGH (ref 70–99)

## 2020-06-04 PROCEDURE — 93312 ECHO TRANSESOPHAGEAL: CPT | Mod: 26

## 2020-06-04 PROCEDURE — 92960 CARDIOVERSION ELECTRIC EXT: CPT

## 2020-06-04 PROCEDURE — 93325 DOPPLER ECHO COLOR FLOW MAPG: CPT | Mod: 26

## 2020-06-04 PROCEDURE — 93320 DOPPLER ECHO COMPLETE: CPT | Mod: 26

## 2020-06-04 NOTE — ASU PATIENT PROFILE, ADULT - PT NEEDS ASSIST
[No falls in past year] : Patient reported no falls in the past year [0] : 2) Feeling down, depressed, or hopeless: Not at all (0) [] : No [[id number]] : 0 no

## 2020-06-04 NOTE — CHART NOTE - NSCHARTNOTEFT_GEN_A_CORE
PHILIP /DCCV Post Procedure Note:    After risks, benefits and alternatives of the procedure were explained, consent was signed and placed in the medical record.  Procedural timeout was taken.  Sedation was administered by anesthesia.  PHILIP probe inserted without complication and PHILIP performed.  Patient tolerated the procedure well without complication.  Post-procedure vital signs were stable.      PHILIP findings:  LVEF- normal   LISSETH- low velocities, No thrombus  Intact IAS, no shunt on bubble study    Mild to moderate MR  Mild AR  normal TV, PV  Full report to follow      Cardioversion attempted x1 (360J). Successfully cardioverted to sinus rhythm.      Post  cardioversion EKG : Sinus rhythm     Recommendations:  Continue anticoagulation, amiodarone and BB  counseled regarding CPAP compliance  out pt cardiology f/u.

## 2020-06-04 NOTE — H&P CARDIOLOGY - HISTORY OF PRESENT ILLNESS
74y Male here for elective PHILIP/ DCCV for atrial fibrillation. Patient denies any chest pain or SOB. Patient is compliant with anticoagulant therapy. pt has h/o CAD s/p PCI, DM, HTN, DLD, ETELVINA on CPAP.     EKG: Atrial Fibrillation    Physical Exam:    General: NAD  Neurology: A&Ox3, nonfocal  Eyes: PERRLA/ EOMI.  ENT/Neck: No JVD.   Respiratory: CTA B/L, No wheezing, rales, rhonchi  CV: S1S2, no murmurs, rubs or gallops  Abdominal: Soft, NT, ND +BS.  Extremities: No edema, + peripheral pulses B/L.   Skin: No Rashes, Hematoma, Ecchymosis    Labs: reviewed

## 2020-06-04 NOTE — CHART NOTE - NSCHARTNOTEFT_GEN_A_CORE
PACU ANESTHESIA ADMISSION NOTE      Procedure: PHILIP and cardioversion  Post op diagnosis: a.fib      _x___  Patent Airway    _x___  Full return of protective reflexes    __x__  Full recovery from anesthesia / back to baseline status    Vitals:  T(C): 36 C  HR: 85  BP: 111/63  RR: 21  SpO2: 98%    Mental Status:  __x__ Awake   __x___ Alert   _____ Drowsy   _____ Sedated    Nausea/Vomiting:  __x__ NO  ______Yes,   See Post - Op Orders          Pain Scale (0-10):  _0____    Treatment: __x__ None    ____ See Post - Op/PCA Orders    Post - Operative Fluids:   ____ Oral   __x__ See Post - Op Orders    Plan: Discharge:   __x__Home       _____Floor     _____Critical Care    _____  Other:_________________    Comments: uneventful anesthesia course no complications. Vitals stable. Pt transferred to PACU. Discharge when criteria met per cardiology

## 2020-06-04 NOTE — ASU PATIENT PROFILE, ADULT - VISION (WITH CORRECTIVE LENSES IF THE PATIENT USUALLY WEARS THEM):
glasses for readingA/Partially impaired: cannot see medication labels or newsprint, but can see obstacles in path, and the surrounding layout; can count fingers at arm's length

## 2020-06-12 ENCOUNTER — APPOINTMENT (OUTPATIENT)
Dept: CARDIOLOGY | Facility: CLINIC | Age: 75
End: 2020-06-12

## 2020-06-16 ENCOUNTER — APPOINTMENT (OUTPATIENT)
Dept: CARDIOLOGY | Facility: CLINIC | Age: 75
End: 2020-06-16
Payer: MEDICARE

## 2020-06-16 PROCEDURE — 93000 ELECTROCARDIOGRAM COMPLETE: CPT

## 2020-06-16 PROCEDURE — 99214 OFFICE O/P EST MOD 30 MIN: CPT

## 2020-06-23 ENCOUNTER — APPOINTMENT (OUTPATIENT)
Dept: CARDIOLOGY | Facility: CLINIC | Age: 75
End: 2020-06-23

## 2020-07-01 ENCOUNTER — APPOINTMENT (OUTPATIENT)
Dept: CARDIOLOGY | Facility: CLINIC | Age: 75
End: 2020-07-01

## 2020-07-01 ENCOUNTER — APPOINTMENT (OUTPATIENT)
Dept: CARDIOLOGY | Facility: CLINIC | Age: 75
End: 2020-07-01
Payer: MEDICARE

## 2020-07-01 VITALS
SYSTOLIC BLOOD PRESSURE: 134 MMHG | BODY MASS INDEX: 39.2 KG/M2 | HEIGHT: 71 IN | HEART RATE: 73 BPM | TEMPERATURE: 96.6 F | WEIGHT: 280 LBS | DIASTOLIC BLOOD PRESSURE: 84 MMHG

## 2020-07-01 PROCEDURE — 99213 OFFICE O/P EST LOW 20 MIN: CPT

## 2020-07-01 PROCEDURE — 93000 ELECTROCARDIOGRAM COMPLETE: CPT

## 2020-07-07 NOTE — PHYSICAL EXAM
[General Appearance - Well Developed] : well developed [General Appearance - Well Nourished] : well nourished [Normal Appearance] : normal appearance [General Appearance - In No Acute Distress] : no acute distress [Normal Oral Mucosa] : normal oral mucosa [Respiration, Rhythm And Depth] : normal respiratory rhythm and effort [Murmurs] : no murmurs present [Auscultation Breath Sounds / Voice Sounds] : lungs were clear to auscultation bilaterally [Heart Rate And Rhythm] : heart rate and rhythm were normal [Bowel Sounds] : normal bowel sounds [Edema] : no peripheral edema present [Abdomen Soft] : soft [Abnormal Walk] : normal gait [Skin Color & Pigmentation] : normal skin color and pigmentation [Cyanosis, Localized] : no localized cyanosis [Oriented To Time, Place, And Person] : oriented to person, place, and time [] : no rash [FreeTextEntry1] : no jvd

## 2020-07-07 NOTE — REASON FOR VISIT
[FreeTextEntry1] : Cardiology: Dr. Rizzo\par \par Presented s/p PHILIP /DCCV on June 4, 2020\par \par ECG ( 7/1/2020) 68 bpm sinus rhythm  ms, QRS 106ms, QTC 432ms\par \par Patient denies any palpitations, sob, STRINGER, chest pains

## 2020-07-07 NOTE — ASSESSMENT
[FreeTextEntry1] : 74 years old  with PMH CAD, s/p PCI, DM, HTN, DLD, ETELVINA on CPAP, seizures and kidney stone.He has Paroxysmal  Atrial fibrillation with [id number] vasc score of 4 ( htn, Dm, age and vasc)\par He is  s/p JACINDA guided /DCCV on June 4\par \par A/P\par #PAF - s/p jacinda /dccv - currently in snr\par   c/w xarelto 20mg daily - denies any bleeding issues\par   5/19 H/H 13.9/44.4  Creat 1.3\par   c/w metoprolol 25mg twice daily for rate control\par \par # ETELVINA - report he is compliant in using his CPAP since DCCV\par \par # Follow in 3 months with Dr. Hall for further reassessment of afib and treatment options\par

## 2020-07-21 ENCOUNTER — RECORD ABSTRACTING (OUTPATIENT)
Age: 75
End: 2020-07-21

## 2020-07-21 DIAGNOSIS — Z86.79 PERSONAL HISTORY OF OTHER DISEASES OF THE CIRCULATORY SYSTEM: ICD-10-CM

## 2020-07-21 DIAGNOSIS — I35.0 NONRHEUMATIC AORTIC (VALVE) STENOSIS: ICD-10-CM

## 2020-07-21 DIAGNOSIS — I65.23 OCCLUSION AND STENOSIS OF BILATERAL CAROTID ARTERIES: ICD-10-CM

## 2020-07-21 DIAGNOSIS — Z98.61 CORONARY ANGIOPLASTY STATUS: ICD-10-CM

## 2020-07-21 DIAGNOSIS — I34.0 NONRHEUMATIC MITRAL (VALVE) INSUFFICIENCY: ICD-10-CM

## 2020-07-21 DIAGNOSIS — I36.9 NONRHEUMATIC TRICUSPID VALVE DISORDER, UNSPECIFIED: ICD-10-CM

## 2020-07-21 DIAGNOSIS — Z86.39 PERSONAL HISTORY OF OTHER ENDOCRINE, NUTRITIONAL AND METABOLIC DISEASE: ICD-10-CM

## 2020-07-21 DIAGNOSIS — I50.9 HEART FAILURE, UNSPECIFIED: ICD-10-CM

## 2020-07-24 ENCOUNTER — LABORATORY RESULT (OUTPATIENT)
Age: 75
End: 2020-07-24

## 2020-07-30 ENCOUNTER — APPOINTMENT (OUTPATIENT)
Dept: CARDIOLOGY | Facility: CLINIC | Age: 75
End: 2020-07-30

## 2020-08-05 NOTE — H&P PST ADULT - TEMPERATURE IN CELSIUS (DEGREES C)
Virtual Regular Visit      Assessment/Plan:    Problem List Items Addressed This Visit        Endocrine    Acquired hypothyroidism    Nontoxic multinodular goiter    Type 2 diabetes mellitus with hyperglycemia, without long-term current use of insulin ([facility] [address] ) - Primary       Cardiovascular and Mediastinum    Essential hypertension               Reason for visit is hypothyroidism, thyroid nodule, DM  Chief Complaint   Patient presents with    Virtual Regular Visit        Encounter provider Maco Bacon PA-C    Provider located at [address] [place] [phone]      Recent Visits  No visits were found meeting these conditions  Showing recent visits within past 7 days and meeting all other requirements     Future Appointments  No visits were found meeting these conditions  Showing future appointments within next 150 days and meeting all other requirements        The patient was identified by name and date of birth  Waldo Almaraz was informed that this is a telemedicine visit and that the visit is being conducted through [employer] [facility] and patient was informed that this is not a secure, HIPAA-complaint platform  She agrees to proceed     My office door was closed  No one else was in the room  She acknowledged consent and understanding of privacy and security of the video platform  The patient has agreed to participate and understands they can discontinue the visit at any time  Patient is aware this is a billable service  Subjective  Waldo Almaraz is a 73 y o  female here for follow-up hypothyroidism, goiter, type 2 diabetes  Hypothyroidism/goiter: Patient is on levothyroxine 112 mcg 1 tablet daily  Patient is taking it 1 hr before breakfast on an empty stomach  Does not wait 4 hrs to take supplements  Tolerating medication well  No history of external radiation to head/neck/chest  No family history of thyroid cancer   No recent Iodine loading in form of medication, erbs or kelp supplements or radiological diagnostic studies  Most recent thyroid ultrasound results: June 2019  FINDINGS:  Thyroid parenchyma is diffusely heterogeneous in echotexture with focal nodule(s) as described below  Right lobe:  5 2 x 1 8 x 2 8 cm  Left lobe:  4 1 x 2 0 x 1 8 cm  Isthmus:  0 3 cm  Nodule #1  Image 8  RIGHT midgland nodule measuring 1 5 x 1 8 x 1 4 cm  Given differences in measuring technique, no significant change from prior  This nodule stable dating back to 9/4/2013  COMPOSITION:  2 points, solid or almost completely solid   ECHOGENICITY:  1 point, hyperechoic or isoechoic  SHAPE:  0 points, wider-than-tall  MARGIN: 0 points, smooth  ECHOGENIC FOCI:  0 points, none or large comet-tail artifacts  TI-RADS Classification: TR 3 (3 points), Mildly suspicious  FNA if >2 5 cm  Follow if >1 5 cm  This nodule remains stable for greater than 5 years and has had a prior benign biopsy  If there is a high level of clinical concern, continued surveillance at 2 year intervals could be considered, otherwise no additional workup recommended  Nodule #2  Image 15  RIGHT lower pole nodule measuring 1 5 x 1 5 x 1 1 cm  Given differences in measuring technique, no significant change from prior  This nodule stable dating back to 9/4/2013  COMPOSITION:  2 points, solid or almost completely solid   ECHOGENICITY:  1 point, hyperechoic or isoechoic  SHAPE:  0 points, wider-than-tall  MARGIN: 0 points, smooth  ECHOGENIC FOCI:  0 points, none or large comet-tail artifacts  TI-RADS Classification: TR 3 (3 points), Mildly suspicious  FNA if >2 5 cm  Follow if >1 5 cm  This nodule remains stable for greater than 5 years and has had a prior benign biopsy  If there is a high level of clinical concern, continued surveillance at 2 year intervals could be considered, otherwise no additional workup recommended  Nodule #3  Image 26  RIGHT upper pole nodule measuring 1 2 x 1 6 x 1 1 cm  Given differences in measuring technique, no significant change from prior  COMPOSITION:  2 points, solid or almost completely solid   ECHOGENICITY:  1 point, hyperechoic or isoechoic  SHAPE:  0 points, wider-than-tall  MARGIN: 0 points, smooth  ECHOGENIC FOCI:  0 points, none or large comet-tail artifacts  TI-RADS Classification: TR 3 (3 points), Mildly suspicious  FNA if >2 5 cm  Follow if >1 5 cm  This nodule remains stable for greater than 5 years and has had a prior benign biopsy  If there is a high level of clinical concern, continued surveillance at 2 year intervals could be considered, otherwise no additional workup recommended  Nodule #4  Image 55 and 56  LEFT midgland nodule measuring 1 8 x 1 2 x 1 0 cm  Given differences in measuring technique, no significant change from prior  This is stable dating back to 10/15/2013  COMPOSITION:  2 points, solid or almost completely solid   ECHOGENICITY:  1 point, hyperechoic or isoechoic  SHAPE:  0 points, wider-than-tall  MARGIN: 0 points, ill-defined  ECHOGENIC FOCI:  0 points, none or large comet-tail artifacts  TI-RADS Classification: TR 3 (3 points), Mildly suspicious  FNA if >2 5 cm  Follow if >1 5 cm  This nodule remains stable for greater than 5 years and has had a prior benign biopsy  If there is a high level of clinical concern, continued surveillance at 2 year intervals could be considered, otherwise no additional workup recommended  There are additional nodules of lesser size and/or TI-RADS score  These do not necessitate additional evaluation based on ACR criteria  IMPRESSION:     Bilateral thyroid nodules as described above  These are stable for a period of greater than 5 years with prior benign biopsy    If there is a high level of clinical concern, continued surveillance at 2 year intervals could be considered, otherwise no additional workup recommended  Type 2 diabetes: Patient is currently taking Invokana 300 mg daily  She is tolerating medication well  In the past she has tried metformin but did not tolerate it  Patient is taking a statin and ACE-inhibitor  Her eye and foot exams are up-to-date  Most recent hemoglobin A1c controlled at 6 7%  She is checking blood glucose levels every other day  She reports range of 100-160 mg/dl      Component      Latest Ref Rng & Units 3/23/2020 7/31/2020   Sodium      136 - 145 mmol/L 142    Potassium      3 5 - 5 3 mmol/L 4 4    Chloride      100 - 108 mmol/L 109 (H)    CO2      21 - 32 mmol/L 28    Anion Gap      4 - 13 mmol/L 5    BUN      5 - 25 mg/dL 19    Creatinine      0 60 - 1 30 mg/dL 0 79    GLUCOSE FASTING      65 - 99 mg/dL 135 (H)    Calcium      8 3 - 10 1 mg/dL 8 9    eGFR      ml/min/1 73sq m 77    EXT Creatinine Urine      mg/dL  26 4   MICROALBUM ,U,RANDOM      0 0 - 20 0 mg/L  <5 0   MICROALBUMIN/CREATININE RATIO      0 - 30 mg/g creatinine  <19   Hemoglobin A1C      6 5 6 4 (H) 6 7 (A)   eAG, EST AVG Glucose      mg/dl 137    Free T4      0 76 - 1 46 ng/dL 1 20    TSH 3RD GENERATON      0 358 - 3 740 uIU/mL 0 784           Past Medical History:   Diagnosis Date    Allergic rhinitis     Allergies     Anemia     Anxiety     Chronic constipation     Diabetes (HCC)     Diabetes mellitus (HCC)     GERD (gastroesophageal reflux disease)     Hyperlipidemia     Hypertension     Hypothyroidism     Obesity     Obesity     Restless legs     Vitamin D deficiency        Past Surgical History:   Procedure Laterality Date    BREAST BIOPSY Right 2001    BREAST LUMPECTOMY Right     BREAST LUMPECTOMY Right 01/01/2001    Dr Palmer Shelter COLONOSCOPY  06/01/2018    goes q 5 yr-due in Summer 2018-Dr Ghazal Grayson  2015    Dr Randall Fillers  01/01/2000    Dr Angel Castrejon       Current Outpatient Medications   Medication Sig Dispense Refill    ascorbic acid (VITAMIN C) 500 mg tablet Take 500 mg by mouth daily      aspirin (ECOTRIN LOW STRENGTH) 81 mg EC tablet Take 81 mg by mouth daily      atorvastatin (LIPITOR) 80 mg tablet Take 1 tablet (80 mg total) by mouth daily 90 tablet 0    Cholecalciferol (VITAMIN D3) 1000 units CAPS Take 1,000 Units by mouth      famotidine-calcium carbonate-magnesium hydroxide (PEPCID COMPLETE) -165 MG CHEW Chew 1 tablet daily as needed for heartburn      fluticasone (FLONASE) 50 mcg/act nasal spray 1 spray into each nostril daily      gatifloxacin (ZYMAXID) 0 5 %       INVELTYS 1 % SUSP       INVOKANA 300 MG TABS Take 300 mg by mouth daily       levocetirizine (XYZAL) 5 MG tablet Take 5 mg by mouth every evening      levothyroxine 112 mcg tablet Take 1 tablet (112 mcg total) by mouth daily 90 tablet 1    Magnesium Cl-Calcium Carbonate (SLOW-MAG PO) Take by mouth      multivitamin (THERAGRAN) TABS Take 1 tablet by mouth daily      nebivolol (BYSTOLIC) 5 mg tablet Take 2 5 mg by mouth daily       Omega-3 Fatty Acids (FISH OIL) 1200 MG CPDR 1200mg two  tablets      ONE TOUCH ULTRA TEST test strip 1 each by Other route daily Test sugars twice every other day 90 each 3    OneTouch Delica Lancets 17Q MISC Use to check sugars twice every other day 90 each 3    potassium chloride (K-DUR) 10 mEq tablet Take 1 tablet (10 mEq total) by mouth 2 (two) times a day 180 tablet 0    pramipexole (MIRAPEX) 0 25 mg tablet Take 0 25 mg by mouth daily       ramipril (ALTACE) 2 5 mg capsule Take 1 capsule (2 5 mg total) by mouth daily 90 capsule 1     No current facility-administered medications for this visit  Allergies   Allergen Reactions    Other      Seasonal, dust, Cat dander, mold    Penicillins        Review of Systems   Constitutional: Negative for activity change, appetite change, fatigue and unexpected weight change  HENT: Negative for trouble swallowing  Eyes: Negative for visual disturbance  Respiratory: Negative for shortness of breath  Cardiovascular: Negative for chest pain and palpitations  Gastrointestinal: Negative for constipation and diarrhea  Endocrine: Negative for cold intolerance, heat intolerance, polydipsia and polyuria  Musculoskeletal: Positive for arthralgias (arthritis)  Skin: Negative for wound  Neurological: Negative for numbness  Psychiatric/Behavioral: Negative  Video Exam    There were no vitals filed for this visit  Physical Exam     Physical Exam   Constitutional: She is oriented to person, place, and time  She appears well-developed and well-nourished  No distress  HENT:   Head: Normocephalic and atraumatic  Neck: Normal range of motion  Pulmonary/Chest: Effort normal    Musculoskeletal: Normal range of motion  Neurological: She is alert and oriented to person, place, and time  Skin: She is not diaphoretic  Psychiatric: She has a normal mood and affect  Her behavior is normal      PLAN  Type 2 diabetes mellitus with hyperglycemia, without long-term current use of insulin ([facility])  HGA1C 6 7%  Worsened  Treatment regimen: Continue current treatment  Discussed risks/complications associated with uncontrolled diabetes  Advised to adhere to diabetic diet, and recommended staying active/exercising routinely  Keep carbohydrates consistent to limit blood glucose fluctuations  Advised to call if blood sugars less than 70 mg/dl or over 300 mg/dl  Check blood glucose 1 time a day  Discussed symptoms and treatment of hypoglycemia  Recommended routine follow-up with podiatry and ophthalmology  Ordered blood work to complete prior to next visit    Essential hypertension  Continue current treatment  Hypothyroidism  Thyroid function tests previously within normal range, TSH 0 784 and free T4 1 20  Continue current dose of levothyroxine  Repeat blood work prior to next visit   Nontoxic multinodular goiter  June 2019: Bilateral thyroid nodules that are stable for a period of greater than 5 years with prior benign biopsy  I spent 25 minutes directly with the patient during this visit    Arthur Parikh PA-C    VIRTUAL VISIT DISCLAIMER    Kristin Tadeo acknowledges that she has consented to an online visit or consultation  She understands that the online visit is based solely on information provided by her, and that, in the absence of a face-to-face physical evaluation by the physician, the diagnosis she receives is both limited and provisional in terms of accuracy and completeness  This is not intended to replace a full medical face-to-face evaluation by the physician  Kristin Tadeo understands and accepts these terms  36.5

## 2020-09-10 ENCOUNTER — APPOINTMENT (OUTPATIENT)
Dept: CARDIOLOGY | Facility: CLINIC | Age: 75
End: 2020-09-10
Payer: MEDICARE

## 2020-09-10 VITALS
SYSTOLIC BLOOD PRESSURE: 108 MMHG | BODY MASS INDEX: 39.9 KG/M2 | DIASTOLIC BLOOD PRESSURE: 70 MMHG | HEART RATE: 66 BPM | WEIGHT: 285 LBS | HEIGHT: 71 IN | TEMPERATURE: 99.2 F

## 2020-09-10 PROCEDURE — 99214 OFFICE O/P EST MOD 30 MIN: CPT

## 2020-09-10 PROCEDURE — 93000 ELECTROCARDIOGRAM COMPLETE: CPT

## 2020-09-13 ENCOUNTER — RX RENEWAL (OUTPATIENT)
Age: 75
End: 2020-09-13

## 2020-10-05 ENCOUNTER — RX RENEWAL (OUTPATIENT)
Age: 75
End: 2020-10-05

## 2020-10-19 ENCOUNTER — RX RENEWAL (OUTPATIENT)
Age: 75
End: 2020-10-19

## 2020-10-23 ENCOUNTER — NON-APPOINTMENT (OUTPATIENT)
Age: 75
End: 2020-10-23

## 2020-10-23 ENCOUNTER — APPOINTMENT (OUTPATIENT)
Dept: CARDIOLOGY | Facility: CLINIC | Age: 75
End: 2020-10-23
Payer: MEDICARE

## 2020-10-23 VITALS
WEIGHT: 285 LBS | HEART RATE: 69 BPM | BODY MASS INDEX: 39.9 KG/M2 | SYSTOLIC BLOOD PRESSURE: 102 MMHG | DIASTOLIC BLOOD PRESSURE: 78 MMHG | TEMPERATURE: 98.1 F | HEIGHT: 71 IN

## 2020-10-23 DIAGNOSIS — G47.33 OBSTRUCTIVE SLEEP APNEA (ADULT) (PEDIATRIC): ICD-10-CM

## 2020-10-23 PROCEDURE — 93000 ELECTROCARDIOGRAM COMPLETE: CPT

## 2020-10-23 PROCEDURE — 99214 OFFICE O/P EST MOD 30 MIN: CPT

## 2020-10-23 NOTE — PHYSICAL EXAM
[Normal Conjunctiva] : the conjunctiva exhibited no abnormalities [Eyelids - No Xanthelasma] : the eyelids demonstrated no xanthelasmas [Normal Oral Mucosa] : normal oral mucosa [No Oral Pallor] : no oral pallor [No Oral Cyanosis] : no oral cyanosis [Abdomen Soft] : soft [Abdomen Tenderness] : non-tender [Abdomen Mass (___ Cm)] : no abdominal mass palpated [Nail Clubbing] : no clubbing of the fingernails [Cyanosis, Localized] : no localized cyanosis [Petechial Hemorrhages (___cm)] : no petechial hemorrhages [] : no ischemic changes

## 2020-10-23 NOTE — HISTORY OF PRESENT ILLNESS
[FreeTextEntry1] : Patient with history  DVT, ETELVINA dnot wear mask, HTN, DM, PCI, CAD, HLD seizures and kidney stone. Pt was in hospital bc of back pain nd pt found to have AF. CHADVASC 4. Patietn was in painand did not feel much palpitation. Patient had syncope two years a ago for unknown reason. Work up was negative. Patient denies CP, SOB, STRINGER, NV\par \par Patient denies palpitations or bleeding. However it does continues to complain some weakness and shorter breath. Last month patient so Dr. Rizzo and he was still in atrial fibrillation. His mentoprolol was increased.\par \par Patietn s/p DCCV. Now in NSR. Was placed on amio for now\par \par EKG SR 69 bpm QTc 440 msec

## 2020-10-23 NOTE — ASSESSMENT
[FreeTextEntry1] : new oset of PAF - \par - CHADVASC 4 cont AC\par - Amiodarone for short period of time. I discussed with patient possibly ablation as well. Next visit will consider discontinuing amiodarone.\par -  FU with pulmonary for ETELVINA adjustment - I encouraged the patient to got to pulmonary

## 2020-11-27 ENCOUNTER — OUTPATIENT (OUTPATIENT)
Dept: OUTPATIENT SERVICES | Facility: HOSPITAL | Age: 75
LOS: 1 days | Discharge: HOME | End: 2020-11-27

## 2020-11-27 DIAGNOSIS — I25.10 ATHEROSCLEROTIC HEART DISEASE OF NATIVE CORONARY ARTERY WITHOUT ANGINA PECTORIS: Chronic | ICD-10-CM

## 2020-11-27 DIAGNOSIS — Z11.59 ENCOUNTER FOR SCREENING FOR OTHER VIRAL DISEASES: ICD-10-CM

## 2020-11-27 DIAGNOSIS — Z98.890 OTHER SPECIFIED POSTPROCEDURAL STATES: Chronic | ICD-10-CM

## 2020-11-27 DIAGNOSIS — Z98.49 CATARACT EXTRACTION STATUS, UNSPECIFIED EYE: Chronic | ICD-10-CM

## 2020-11-27 DIAGNOSIS — N20.0 CALCULUS OF KIDNEY: Chronic | ICD-10-CM

## 2020-11-30 ENCOUNTER — OUTPATIENT (OUTPATIENT)
Dept: OUTPATIENT SERVICES | Facility: HOSPITAL | Age: 75
LOS: 1 days | Discharge: HOME | End: 2020-11-30

## 2020-11-30 DIAGNOSIS — I25.10 ATHEROSCLEROTIC HEART DISEASE OF NATIVE CORONARY ARTERY WITHOUT ANGINA PECTORIS: Chronic | ICD-10-CM

## 2020-11-30 DIAGNOSIS — Z98.890 OTHER SPECIFIED POSTPROCEDURAL STATES: Chronic | ICD-10-CM

## 2020-11-30 DIAGNOSIS — Z98.49 CATARACT EXTRACTION STATUS, UNSPECIFIED EYE: Chronic | ICD-10-CM

## 2020-11-30 DIAGNOSIS — N20.0 CALCULUS OF KIDNEY: Chronic | ICD-10-CM

## 2020-12-01 DIAGNOSIS — G47.33 OBSTRUCTIVE SLEEP APNEA (ADULT) (PEDIATRIC): ICD-10-CM

## 2020-12-09 ENCOUNTER — RX RENEWAL (OUTPATIENT)
Age: 75
End: 2020-12-09

## 2021-02-18 ENCOUNTER — RX RENEWAL (OUTPATIENT)
Age: 76
End: 2021-02-18

## 2021-03-26 ENCOUNTER — APPOINTMENT (OUTPATIENT)
Dept: CARDIOLOGY | Facility: CLINIC | Age: 76
End: 2021-03-26
Payer: MEDICARE

## 2021-03-26 VITALS
HEIGHT: 71 IN | DIASTOLIC BLOOD PRESSURE: 70 MMHG | WEIGHT: 280 LBS | SYSTOLIC BLOOD PRESSURE: 112 MMHG | HEART RATE: 72 BPM | BODY MASS INDEX: 39.2 KG/M2 | TEMPERATURE: 98.2 F

## 2021-03-26 PROCEDURE — 93000 ELECTROCARDIOGRAM COMPLETE: CPT

## 2021-03-26 PROCEDURE — 99214 OFFICE O/P EST MOD 30 MIN: CPT

## 2021-03-26 RX ORDER — OMEPRAZOLE MAGNESIUM 40 MG/1
40 CAPSULE, DELAYED RELEASE ORAL
Refills: 0 | Status: DISCONTINUED | COMMUNITY
End: 2021-03-26

## 2021-03-26 NOTE — REVIEW OF SYSTEMS
Please see how patient is doing, and what her BPs are like these days.    Pharmacy made a few recommendations on what meds we can increase. They suggested increasing your doxazosin or scheduling lasix twice daily. We could also start candesartan (its an ARB). If she has questions, we can talk.    Thanks,  WENDY Lazo, PharmD 16 hours ago (3:10 PM)         Good afternoon,      Sorry for the delayed response.      After reviewing her chart, I have a few potential medication options.   1) Increasing doxazosin if she can tolerate orthostatic hypotension     2) Scheduling furosemide twice daily for hypertension     3) ACE-I contraindicated due to angioedema. Per guidelines, cross-reactivity with ARB is low. May consider irbesartan.This is first-line considering her CKD; however, I am on the fence with this due to her previous angioedema with ACE-I and medication allergies.       Thanks for allowing us to participate in her care! If I can help in any other way, let me know.      Mila Lazo, PharmD.  [phone]              [Negative] : Heme/Lymph

## 2021-04-06 ENCOUNTER — LABORATORY RESULT (OUTPATIENT)
Age: 76
End: 2021-04-06

## 2021-04-12 ENCOUNTER — APPOINTMENT (OUTPATIENT)
Dept: CARDIOLOGY | Facility: CLINIC | Age: 76
End: 2021-04-12
Payer: MEDICARE

## 2021-04-12 PROCEDURE — 93306 TTE W/DOPPLER COMPLETE: CPT

## 2021-04-15 ENCOUNTER — APPOINTMENT (OUTPATIENT)
Dept: CARDIOLOGY | Facility: CLINIC | Age: 76
End: 2021-04-15
Payer: MEDICARE

## 2021-04-15 VITALS
BODY MASS INDEX: 41.44 KG/M2 | TEMPERATURE: 98 F | HEIGHT: 71 IN | HEART RATE: 74 BPM | SYSTOLIC BLOOD PRESSURE: 134 MMHG | DIASTOLIC BLOOD PRESSURE: 80 MMHG | WEIGHT: 296 LBS

## 2021-04-15 PROCEDURE — 99214 OFFICE O/P EST MOD 30 MIN: CPT

## 2021-04-15 PROCEDURE — 93000 ELECTROCARDIOGRAM COMPLETE: CPT

## 2021-04-19 ENCOUNTER — RX RENEWAL (OUTPATIENT)
Age: 76
End: 2021-04-19

## 2021-05-19 ENCOUNTER — EMERGENCY (EMERGENCY)
Facility: HOSPITAL | Age: 76
LOS: 0 days | Discharge: HOME | End: 2021-05-19
Attending: EMERGENCY MEDICINE | Admitting: EMERGENCY MEDICINE
Payer: MEDICARE

## 2021-05-19 VITALS
TEMPERATURE: 97 F | DIASTOLIC BLOOD PRESSURE: 69 MMHG | SYSTOLIC BLOOD PRESSURE: 136 MMHG | OXYGEN SATURATION: 98 % | RESPIRATION RATE: 18 BRPM | HEART RATE: 72 BPM

## 2021-05-19 VITALS
TEMPERATURE: 98 F | HEIGHT: 71 IN | WEIGHT: 285.06 LBS | OXYGEN SATURATION: 97 % | RESPIRATION RATE: 20 BRPM | SYSTOLIC BLOOD PRESSURE: 121 MMHG | HEART RATE: 78 BPM | DIASTOLIC BLOOD PRESSURE: 96 MMHG

## 2021-05-19 DIAGNOSIS — Z88.1 ALLERGY STATUS TO OTHER ANTIBIOTIC AGENTS STATUS: ICD-10-CM

## 2021-05-19 DIAGNOSIS — Z95.1 PRESENCE OF AORTOCORONARY BYPASS GRAFT: ICD-10-CM

## 2021-05-19 DIAGNOSIS — N20.0 CALCULUS OF KIDNEY: Chronic | ICD-10-CM

## 2021-05-19 DIAGNOSIS — Z98.890 OTHER SPECIFIED POSTPROCEDURAL STATES: Chronic | ICD-10-CM

## 2021-05-19 DIAGNOSIS — I25.10 ATHEROSCLEROTIC HEART DISEASE OF NATIVE CORONARY ARTERY WITHOUT ANGINA PECTORIS: ICD-10-CM

## 2021-05-19 DIAGNOSIS — I25.2 OLD MYOCARDIAL INFARCTION: ICD-10-CM

## 2021-05-19 DIAGNOSIS — R07.89 OTHER CHEST PAIN: ICD-10-CM

## 2021-05-19 DIAGNOSIS — Z87.09 PERSONAL HISTORY OF OTHER DISEASES OF THE RESPIRATORY SYSTEM: ICD-10-CM

## 2021-05-19 DIAGNOSIS — I48.91 UNSPECIFIED ATRIAL FIBRILLATION: ICD-10-CM

## 2021-05-19 DIAGNOSIS — N40.0 BENIGN PROSTATIC HYPERPLASIA WITHOUT LOWER URINARY TRACT SYMPTOMS: ICD-10-CM

## 2021-05-19 DIAGNOSIS — I10 ESSENTIAL (PRIMARY) HYPERTENSION: ICD-10-CM

## 2021-05-19 DIAGNOSIS — E78.00 PURE HYPERCHOLESTEROLEMIA, UNSPECIFIED: ICD-10-CM

## 2021-05-19 DIAGNOSIS — E03.9 HYPOTHYROIDISM, UNSPECIFIED: ICD-10-CM

## 2021-05-19 DIAGNOSIS — I25.10 ATHEROSCLEROTIC HEART DISEASE OF NATIVE CORONARY ARTERY WITHOUT ANGINA PECTORIS: Chronic | ICD-10-CM

## 2021-05-19 DIAGNOSIS — Z98.49 CATARACT EXTRACTION STATUS, UNSPECIFIED EYE: Chronic | ICD-10-CM

## 2021-05-19 DIAGNOSIS — M25.512 PAIN IN LEFT SHOULDER: ICD-10-CM

## 2021-05-19 DIAGNOSIS — Z86.69 PERSONAL HISTORY OF OTHER DISEASES OF THE NERVOUS SYSTEM AND SENSE ORGANS: ICD-10-CM

## 2021-05-19 DIAGNOSIS — K21.9 GASTRO-ESOPHAGEAL REFLUX DISEASE WITHOUT ESOPHAGITIS: ICD-10-CM

## 2021-05-19 DIAGNOSIS — E11.9 TYPE 2 DIABETES MELLITUS WITHOUT COMPLICATIONS: ICD-10-CM

## 2021-05-19 DIAGNOSIS — E66.9 OBESITY, UNSPECIFIED: ICD-10-CM

## 2021-05-19 DIAGNOSIS — Z86.718 PERSONAL HISTORY OF OTHER VENOUS THROMBOSIS AND EMBOLISM: ICD-10-CM

## 2021-05-19 DIAGNOSIS — Z79.01 LONG TERM (CURRENT) USE OF ANTICOAGULANTS: ICD-10-CM

## 2021-05-19 LAB
ALBUMIN SERPL ELPH-MCNC: 4.6 G/DL — SIGNIFICANT CHANGE UP (ref 3.5–5.2)
ALP SERPL-CCNC: 57 U/L — SIGNIFICANT CHANGE UP (ref 30–115)
ALT FLD-CCNC: 27 U/L — SIGNIFICANT CHANGE UP (ref 0–41)
ANION GAP SERPL CALC-SCNC: 12 MMOL/L — SIGNIFICANT CHANGE UP (ref 7–14)
AST SERPL-CCNC: 26 U/L — SIGNIFICANT CHANGE UP (ref 0–41)
BASOPHILS # BLD AUTO: 0.02 K/UL — SIGNIFICANT CHANGE UP (ref 0–0.2)
BASOPHILS NFR BLD AUTO: 0.4 % — SIGNIFICANT CHANGE UP (ref 0–1)
BILIRUB SERPL-MCNC: 0.4 MG/DL — SIGNIFICANT CHANGE UP (ref 0.2–1.2)
BUN SERPL-MCNC: 20 MG/DL — SIGNIFICANT CHANGE UP (ref 10–20)
CALCIUM SERPL-MCNC: 9.8 MG/DL — SIGNIFICANT CHANGE UP (ref 8.5–10.1)
CHLORIDE SERPL-SCNC: 105 MMOL/L — SIGNIFICANT CHANGE UP (ref 98–110)
CO2 SERPL-SCNC: 24 MMOL/L — SIGNIFICANT CHANGE UP (ref 17–32)
CREAT SERPL-MCNC: 1.3 MG/DL — SIGNIFICANT CHANGE UP (ref 0.7–1.5)
EOSINOPHIL # BLD AUTO: 0.07 K/UL — SIGNIFICANT CHANGE UP (ref 0–0.7)
EOSINOPHIL NFR BLD AUTO: 1.4 % — SIGNIFICANT CHANGE UP (ref 0–8)
GLUCOSE SERPL-MCNC: 145 MG/DL — HIGH (ref 70–99)
HCT VFR BLD CALC: 38.2 % — LOW (ref 42–52)
HGB BLD-MCNC: 12.8 G/DL — LOW (ref 14–18)
IMM GRANULOCYTES NFR BLD AUTO: 0.2 % — SIGNIFICANT CHANGE UP (ref 0.1–0.3)
LYMPHOCYTES # BLD AUTO: 1.5 K/UL — SIGNIFICANT CHANGE UP (ref 1.2–3.4)
LYMPHOCYTES # BLD AUTO: 29.4 % — SIGNIFICANT CHANGE UP (ref 20.5–51.1)
MCHC RBC-ENTMCNC: 29.2 PG — SIGNIFICANT CHANGE UP (ref 27–31)
MCHC RBC-ENTMCNC: 33.5 G/DL — SIGNIFICANT CHANGE UP (ref 32–37)
MCV RBC AUTO: 87 FL — SIGNIFICANT CHANGE UP (ref 80–94)
MONOCYTES # BLD AUTO: 0.39 K/UL — SIGNIFICANT CHANGE UP (ref 0.1–0.6)
MONOCYTES NFR BLD AUTO: 7.6 % — SIGNIFICANT CHANGE UP (ref 1.7–9.3)
NEUTROPHILS # BLD AUTO: 3.11 K/UL — SIGNIFICANT CHANGE UP (ref 1.4–6.5)
NEUTROPHILS NFR BLD AUTO: 61 % — SIGNIFICANT CHANGE UP (ref 42.2–75.2)
NRBC # BLD: 0 /100 WBCS — SIGNIFICANT CHANGE UP (ref 0–0)
PLATELET # BLD AUTO: 124 K/UL — LOW (ref 130–400)
POTASSIUM SERPL-MCNC: 4.3 MMOL/L — SIGNIFICANT CHANGE UP (ref 3.5–5)
POTASSIUM SERPL-SCNC: 4.3 MMOL/L — SIGNIFICANT CHANGE UP (ref 3.5–5)
PROT SERPL-MCNC: 7.2 G/DL — SIGNIFICANT CHANGE UP (ref 6–8)
RBC # BLD: 4.39 M/UL — LOW (ref 4.7–6.1)
RBC # FLD: 13.8 % — SIGNIFICANT CHANGE UP (ref 11.5–14.5)
SODIUM SERPL-SCNC: 141 MMOL/L — SIGNIFICANT CHANGE UP (ref 135–146)
TROPONIN T SERPL-MCNC: <0.01 NG/ML — SIGNIFICANT CHANGE UP
TROPONIN T SERPL-MCNC: <0.01 NG/ML — SIGNIFICANT CHANGE UP
WBC # BLD: 5.1 K/UL — SIGNIFICANT CHANGE UP (ref 4.8–10.8)
WBC # FLD AUTO: 5.1 K/UL — SIGNIFICANT CHANGE UP (ref 4.8–10.8)

## 2021-05-19 PROCEDURE — 93010 ELECTROCARDIOGRAM REPORT: CPT | Mod: 76

## 2021-05-19 PROCEDURE — 99285 EMERGENCY DEPT VISIT HI MDM: CPT

## 2021-05-19 PROCEDURE — 71046 X-RAY EXAM CHEST 2 VIEWS: CPT | Mod: 26

## 2021-05-19 NOTE — ED PROVIDER NOTE - OBJECTIVE STATEMENT
76 y/o male with hx of CAD , stents, NIDDM, a.fib on xarelto presents to the ED with left upper chest pain radiating to left shoulder since monday. patient symptoms started after lifting 500lb drum the day before. patient denies any sob, diaphoresis or back pain. patient c/o pain worse with movement of left arm and deep inspiration. no fevers.

## 2021-05-19 NOTE — ED ADULT NURSE NOTE - NS ED NURSE DISCH DISPOSITION
[FreeTextEntry1] : Acute urticaria which may be secondary to hypothyroidism.  I have advised Mr. Kendrick that his hives are not secondary to previous administration of small pox vaccination:\par \par Allegra 180 mg BID x 1 month
Discharged

## 2021-05-19 NOTE — ED PROVIDER NOTE - NS ED ROS FT
Review of Systems    Constitutional: (-) fever/ chills  ENT: (-) epistaxis (-) sore throat (-) ear pain  Cardiovascular: (+) chest pain, (-) syncope  Respiratory: (-) cough, (-) shortness of breath  Gastrointestinal: (-) vomiting, (-) diarrhea (-) abdominal pain  : (-) dysuria , hematuria   neck: (-) neck pain or stiffness  Musculoskeletal:  (-) back pain, (-) joint pain   Integumentary: (-) rash, (-) swelling  Neurological: (-) headache, (-) altered mental status

## 2021-05-19 NOTE — ED PROVIDER NOTE - PHYSICAL EXAMINATION
Vital Signs: I have reviewed the initial vital signs.  Constitutional: well-nourished, no acute distress  Eyes: PERRLA, EOMI,  clear conjunctiva  ENT: MMM, TM b/l clear , no nasal congestion  Cardiovascular: regular rate, regular rhythm, no murmur appreciated  Respiratory: unlabored respiratory effort, clear to auscultation bilaterally, no chest wall tenderness  Gastrointestinal: soft, non-tender, non-distended  abdomen,   Musculoskeletal: supple neck, no lower extremity edema, no bony tenderness  Integumentary: warm, dry, no rash  Neurologic: awake, alert, cranial nerves II-XII grossly intact, extremities’ motor and sensory functions grossly intact, no focal deficits, GCS 15

## 2021-05-19 NOTE — ED PROVIDER NOTE - CLINICAL SUMMARY MEDICAL DECISION MAKING FREE TEXT BOX
patient improved at this time, his pain is reproducible worse with external shoulder rotation after lifting a empty oil drum out of a pool 8 hours prior we obtained ekg, labs troponin x 2 which is negative this is unlikely acs based on history and exam in addition 2 sets of trponin negative

## 2021-05-19 NOTE — ED ADULT NURSE NOTE - DRUG PRE-SCREENING (DAST -1)
Internal medicine progress note.    Subjective: Pt is awake and has some left shoulder pain. Off levophed.      ROS pain    Vitals:    12/03/17 1100   BP: 159/68   Pulse: 81   Resp: 22   Temp:           Skin: pallor  Neck: supple  Lung: scattered crackles but adequate air flow   CVS: S 1 and S 2  Abdomen: bowel sound present, obese  Musculoskeletal: Left shoulder TTP, limited movement  CNS: Cranial nerves are intact  Motor and sensory are intact.        Recent Labs  Lab 12/03/17  0420   WBC 14.3*   RBC 3.14*   HGB 10.9*   HCT 30.9*   *   NEUT 48   LYMP 9   MON 5   EOS 4            Recent Labs  Lab 12/03/17  0420 12/02/17  0408 12/01/17  0359   SODIUM 134* 131* 135   POTASSIUM 4.2 4.3 4.1   CHLORIDE 105 101 104   CO2 18* 18* 23   BUN 43* 38* 19   CREATININE 2.59* 3.69* 1.80*   GLUCOSE 78 67 94   CALCIUM 7.7* 8.2* 8.1*         Impression:  R50.9 Fever of unknown origin  (primary encounter diagnosis)  Left glenohumeral osteoarthritis, suprascapular nerve impingement,  K74.60 Unspecified cirrhosis of liver  B18.2 Chronic viral hepatitis C  History of alcohol, cocaine abuse  Tobacco dependence  History of left malleolar fracture      Plan:  ECHO pending  Abx as per ID  IV fluids   S/p left shoulder aspiration  Po midodrine  Levophed PRN  Lidocaine patch left shoulder  Continue lactulose  Nicotine patch  Supplement K, mag p.r.n.    ____________________________________________________________  Margot Myers acting as a scribe for Dr. Jimenez     I have reviewed the information recorded by the scribe for accuracy and agree with its contents.     Dr. Tony MD  Physician #:[id number]     Statement Selected

## 2021-05-19 NOTE — ED PROVIDER NOTE - ATTENDING CONTRIBUTION TO CARE
I was present for and supervised the key and critical aspects of the procedures performed during the care of the patient. patient presents for evaluation of left sided shoulder and with radiation to chest wall pain that is moderate after lifting an oil drum out of a pool with no fevers or chills no vomiting no diaphoresis he denies any exertional component   on exam he is nc/at perrla eomi ooropharynx clear cta b/l, rrr s1s2 noted abd-soft nt nd bs+ ext from but patient has pain with palpation of the left shoulder with external rotation     a/p- we obtained ekg and multiple sets of troponin which are negative I will discharge at this time

## 2021-05-19 NOTE — ED PROVIDER NOTE - PATIENT PORTAL LINK FT
You can access the FollowMyHealth Patient Portal offered by [facility] by registering at the following website: [email or web address]. By joining [employer]’s FollowMyHealth portal, you will also be able to view your health information using other applications (apps) compatible with our system.

## 2021-05-19 NOTE — ED ADULT NURSE NOTE - CHIEF COMPLAINT QUOTE
Patient states "Left shoulder pain started Monday after lifting something heavy on Sunday hurt move when I move it". denies Chest pain shortness of breath

## 2021-05-29 NOTE — PHYSICAL EXAM
[Normal Conjunctiva] : the conjunctiva exhibited no abnormalities [Eyelids - No Xanthelasma] : the eyelids demonstrated no xanthelasmas [No Oral Pallor] : no oral pallor [No Oral Cyanosis] : no oral cyanosis [Abdomen Soft] : soft [Nail Clubbing] : no clubbing of the fingernails [Cyanosis, Localized] : no localized cyanosis [Petechial Hemorrhages (___cm)] : no petechial hemorrhages [General Appearance - Well Developed] : well developed [Normal Appearance] : normal appearance [Well Groomed] : well groomed [General Appearance - Well Nourished] : well nourished [No Deformities] : no deformities [General Appearance - In No Acute Distress] : no acute distress [Respiration, Rhythm And Depth] : normal respiratory rhythm and effort [Heart Rate And Rhythm] : heart rate and rhythm were normal [Heart Sounds] : normal S1 and S2 [Arterial Pulses Normal] : the arterial pulses were normal [Abnormal Walk] : normal gait [Skin Color & Pigmentation] : normal skin color and pigmentation [] : no rash [Oriented To Time, Place, And Person] : oriented to person, place, and time [Affect] : the affect was normal [Mood] : the mood was normal [No Anxiety] : not feeling anxious [FreeTextEntry1] : No JVD

## 2021-05-29 NOTE — ASSESSMENT
[FreeTextEntry1] : PAF - \par - CHADVASC 4 cont AC\par - Patient doing well s/p DCCV. Will stop amiodarone at this time.\par - We discussed with patient that atrial fibrillation can return and patient to contact the office if he is having any symptoms. We discussed possibility of ablation if afib recurs.\par \par Plan:\par - c/w current medications. On Xarelto, no signs of bleeding.\par - RTO in 5 months
Detail Level: Generalized

## 2021-05-29 NOTE — HISTORY OF PRESENT ILLNESS
[FreeTextEntry1] : Patient with history DVT, ETELVINA refitted for mask, gets 4-6 hours sleep/night, HTN, DM, PCI, CAD, HLD seizures and kidney stone. Pt was in hospital bc of back pain and pt found to have AF. CHADVASC 4. Patient was in pain and did not feel much palpitations. Patient had syncope two years a ago for unknown reason. Work up was negative. s/p DCCV and in NSR and was placed on amiodarone. \par \par Patient denies palpitations or bleeding. Patient reports feeling well. Admits to decreased physical activity due to COVID. \par \par EKG SR 72 bpm QTc 443 msec \par \par To date, patient is fully COVID vaccinated.

## 2021-08-04 ENCOUNTER — OUTPATIENT (OUTPATIENT)
Dept: OUTPATIENT SERVICES | Facility: HOSPITAL | Age: 76
LOS: 1 days | Discharge: HOME | End: 2021-08-04
Payer: MEDICARE

## 2021-08-04 DIAGNOSIS — Z98.890 OTHER SPECIFIED POSTPROCEDURAL STATES: Chronic | ICD-10-CM

## 2021-08-04 DIAGNOSIS — N20.0 CALCULUS OF KIDNEY: Chronic | ICD-10-CM

## 2021-08-04 DIAGNOSIS — R31.0 GROSS HEMATURIA: ICD-10-CM

## 2021-08-04 DIAGNOSIS — N28.1 CYST OF KIDNEY, ACQUIRED: ICD-10-CM

## 2021-08-04 DIAGNOSIS — N40.1 BENIGN PROSTATIC HYPERPLASIA WITH LOWER URINARY TRACT SYMPTOMS: ICD-10-CM

## 2021-08-04 DIAGNOSIS — I25.10 ATHEROSCLEROTIC HEART DISEASE OF NATIVE CORONARY ARTERY WITHOUT ANGINA PECTORIS: Chronic | ICD-10-CM

## 2021-08-04 DIAGNOSIS — Z98.49 CATARACT EXTRACTION STATUS, UNSPECIFIED EYE: Chronic | ICD-10-CM

## 2021-08-04 PROCEDURE — 74178 CT ABD&PLV WO CNTR FLWD CNTR: CPT | Mod: 26,MH

## 2021-08-07 ENCOUNTER — APPOINTMENT (OUTPATIENT)
Age: 76
End: 2021-08-07
Payer: MEDICARE

## 2021-08-07 VITALS
WEIGHT: 287 LBS | BODY MASS INDEX: 40.18 KG/M2 | OXYGEN SATURATION: 98 % | HEART RATE: 78 BPM | SYSTOLIC BLOOD PRESSURE: 128 MMHG | RESPIRATION RATE: 14 BRPM | HEIGHT: 71 IN | DIASTOLIC BLOOD PRESSURE: 78 MMHG

## 2021-08-07 PROCEDURE — 99214 OFFICE O/P EST MOD 30 MIN: CPT

## 2021-08-07 NOTE — ASSESSMENT
[FreeTextEntry1] : A:\par ETELVINA\par Obesity\par PLAN:\par The patient is benefitting from the PAP device .\par New supplies ordered \par Weight loss discussed\par I stressed the need maintain compliance  with the PAP device.\par The patient is not to use an Ozone or UV sterilizer. \par F/U in 6 months\par \par A:\par Asthma:\par plan:\par Stress compliance with inhalers. Renewed as needed.\par cont PRN albuterol\par cont ICS/LABA\par needs PFT\par weight loss\par F/U 6 months\par

## 2021-08-07 NOTE — HISTORY OF PRESENT ILLNESS
[Moderate Persistent] : moderate persistent [Stable] : stable [Adherent] : the patient is adherent with ~his/her~ medication regimen [Excess Weight] : excess weight [Weight Increase] : weight increase [Follow-Up - Routine Clinic] : a routine clinic follow-up of [None] : The patient is currently asymptomatic [BiPAP] : BiPAP [Good Compliance] : good compliance with treatment [Good Tolerance] : good tolerance of treatment [Good Symptom Control] : good symptom control [Chest Pain] : no chest pain or discomfort [Cough at Night] : not awakened at night with cough [Wheezing at Night] : not awakened at night because of wheezing or trouble breathing [More Frequent Use Needed Recently] : normal [TextBox_4] : I reviewed all the previous sleep test that are available on file.\par I reviewed my previous office notes.\par

## 2021-08-17 ENCOUNTER — RX RENEWAL (OUTPATIENT)
Age: 76
End: 2021-08-17

## 2021-08-20 ENCOUNTER — APPOINTMENT (OUTPATIENT)
Dept: CARDIOLOGY | Facility: CLINIC | Age: 76
End: 2021-08-20
Payer: MEDICARE

## 2021-08-20 VITALS — TEMPERATURE: 98 F | SYSTOLIC BLOOD PRESSURE: 119 MMHG | HEART RATE: 80 BPM | DIASTOLIC BLOOD PRESSURE: 70 MMHG

## 2021-08-20 PROCEDURE — 93000 ELECTROCARDIOGRAM COMPLETE: CPT

## 2021-08-20 PROCEDURE — 99213 OFFICE O/P EST LOW 20 MIN: CPT

## 2021-08-20 RX ORDER — ALBUTEROL SULFATE 90 UG/1
108 (90 BASE) AEROSOL, METERED RESPIRATORY (INHALATION) 4 TIMES DAILY
Refills: 0 | Status: COMPLETED | COMMUNITY
End: 2021-08-20

## 2021-08-20 RX ORDER — CLOPIDOGREL 75 MG/1
75 TABLET, FILM COATED ORAL DAILY
Qty: 90 | Refills: 3 | Status: COMPLETED | COMMUNITY
End: 2021-08-20

## 2021-08-20 RX ORDER — SOFT LENS DISINFECTANT
SOLUTION, NON-ORAL MISCELLANEOUS
Refills: 0 | Status: COMPLETED | COMMUNITY
End: 2021-08-20

## 2021-08-20 RX ORDER — ROSUVASTATIN CALCIUM 40 MG/1
40 TABLET, FILM COATED ORAL DAILY
Qty: 90 | Refills: 3 | Status: COMPLETED | COMMUNITY
Start: 2021-07-08 | End: 2021-08-20

## 2021-08-20 RX ORDER — AZELASTINE HYDROCHLORIDE 137 UG/1
137 SPRAY, METERED NASAL TWICE DAILY
Refills: 0 | Status: COMPLETED | COMMUNITY
End: 2021-08-20

## 2021-08-20 NOTE — ASSESSMENT
[FreeTextEntry1] : PAF - \par - CHADVASC 4 cont AC\par - amiodarone stopped at this time.\par - We discussed with patient that atrial fibrillation can return and patient to contact the office if he is having any symptoms. We discussed possibility of ablation if afib recurs.\par \par Plan:\par - c/w current medications. On Xarelto, no signs of bleeding.\par - RTO in 5 months

## 2021-08-20 NOTE — PHYSICAL EXAM
[Normal Appearance] : normal appearance [General Appearance - Well Developed] : well developed [Well Groomed] : well groomed [General Appearance - Well Nourished] : well nourished [No Deformities] : no deformities [General Appearance - In No Acute Distress] : no acute distress [Normal Conjunctiva] : the conjunctiva exhibited no abnormalities [Eyelids - No Xanthelasma] : the eyelids demonstrated no xanthelasmas [No Oral Pallor] : no oral pallor [No Oral Cyanosis] : no oral cyanosis [FreeTextEntry1] : No JVD [Respiration, Rhythm And Depth] : normal respiratory rhythm and effort [Heart Rate And Rhythm] : heart rate and rhythm were normal [Heart Sounds] : normal S1 and S2 [Arterial Pulses Normal] : the arterial pulses were normal [Abdomen Soft] : soft [Abnormal Walk] : normal gait [Nail Clubbing] : no clubbing of the fingernails [Cyanosis, Localized] : no localized cyanosis [Petechial Hemorrhages (___cm)] : no petechial hemorrhages [Skin Color & Pigmentation] : normal skin color and pigmentation [] : no rash [Oriented To Time, Place, And Person] : oriented to person, place, and time [Affect] : the affect was normal [Mood] : the mood was normal [No Anxiety] : not feeling anxious

## 2021-08-20 NOTE — HISTORY OF PRESENT ILLNESS
[FreeTextEntry1] : Patient with history DVT, ETELVINA refitted for mask, gets 4-6 hours sleep/night, HTN, DM, PCI, CAD, HLD seizures and kidney stone. Pt was in hospital bc of back pain and pt found to have AF. CHADVASC 4. Patient was in pain and did not feel much palpitations. Patient had syncope two years a ago for unknown reason. Work up was negative. s/p DCCV and in NSR and was placed on amiodarone. \par \par Patient denies palpitations or bleeding. Patient reports feeling well. Admits to decreased physical activity due to COVID. \par \par EKG SR 80 bpm QTc 425 msec \par \par

## 2021-08-30 ENCOUNTER — OUTPATIENT (OUTPATIENT)
Dept: OUTPATIENT SERVICES | Facility: HOSPITAL | Age: 76
LOS: 1 days | Discharge: HOME | End: 2021-08-30
Payer: MEDICARE

## 2021-08-30 DIAGNOSIS — Z98.890 OTHER SPECIFIED POSTPROCEDURAL STATES: Chronic | ICD-10-CM

## 2021-08-30 DIAGNOSIS — Z98.49 CATARACT EXTRACTION STATUS, UNSPECIFIED EYE: Chronic | ICD-10-CM

## 2021-08-30 DIAGNOSIS — N20.0 CALCULUS OF KIDNEY: Chronic | ICD-10-CM

## 2021-08-30 DIAGNOSIS — I25.10 ATHEROSCLEROTIC HEART DISEASE OF NATIVE CORONARY ARTERY WITHOUT ANGINA PECTORIS: Chronic | ICD-10-CM

## 2021-08-30 DIAGNOSIS — D41.01 NEOPLASM OF UNCERTAIN BEHAVIOR OF RIGHT KIDNEY: ICD-10-CM

## 2021-08-30 PROCEDURE — 76775 US EXAM ABDO BACK WALL LIM: CPT | Mod: 26

## 2021-09-03 ENCOUNTER — EMERGENCY (EMERGENCY)
Facility: HOSPITAL | Age: 76
LOS: 0 days | Discharge: HOME | End: 2021-09-03
Attending: EMERGENCY MEDICINE | Admitting: EMERGENCY MEDICINE
Payer: MEDICARE

## 2021-09-03 VITALS
OXYGEN SATURATION: 99 % | TEMPERATURE: 96 F | DIASTOLIC BLOOD PRESSURE: 76 MMHG | RESPIRATION RATE: 20 BRPM | SYSTOLIC BLOOD PRESSURE: 162 MMHG | HEART RATE: 78 BPM | HEIGHT: 71 IN | WEIGHT: 289.91 LBS

## 2021-09-03 DIAGNOSIS — G89.29 OTHER CHRONIC PAIN: ICD-10-CM

## 2021-09-03 DIAGNOSIS — R09.89 OTHER SPECIFIED SYMPTOMS AND SIGNS INVOLVING THE CIRCULATORY AND RESPIRATORY SYSTEMS: ICD-10-CM

## 2021-09-03 DIAGNOSIS — E11.9 TYPE 2 DIABETES MELLITUS WITHOUT COMPLICATIONS: ICD-10-CM

## 2021-09-03 DIAGNOSIS — Z86.718 PERSONAL HISTORY OF OTHER VENOUS THROMBOSIS AND EMBOLISM: ICD-10-CM

## 2021-09-03 DIAGNOSIS — Z79.890 HORMONE REPLACEMENT THERAPY: ICD-10-CM

## 2021-09-03 DIAGNOSIS — I25.10 ATHEROSCLEROTIC HEART DISEASE OF NATIVE CORONARY ARTERY WITHOUT ANGINA PECTORIS: ICD-10-CM

## 2021-09-03 DIAGNOSIS — Z95.5 PRESENCE OF CORONARY ANGIOPLASTY IMPLANT AND GRAFT: ICD-10-CM

## 2021-09-03 DIAGNOSIS — Z86.69 PERSONAL HISTORY OF OTHER DISEASES OF THE NERVOUS SYSTEM AND SENSE ORGANS: ICD-10-CM

## 2021-09-03 DIAGNOSIS — Z98.890 OTHER SPECIFIED POSTPROCEDURAL STATES: Chronic | ICD-10-CM

## 2021-09-03 DIAGNOSIS — E78.00 PURE HYPERCHOLESTEROLEMIA, UNSPECIFIED: ICD-10-CM

## 2021-09-03 DIAGNOSIS — M54.9 DORSALGIA, UNSPECIFIED: ICD-10-CM

## 2021-09-03 DIAGNOSIS — E66.9 OBESITY, UNSPECIFIED: ICD-10-CM

## 2021-09-03 DIAGNOSIS — I25.10 ATHEROSCLEROTIC HEART DISEASE OF NATIVE CORONARY ARTERY WITHOUT ANGINA PECTORIS: Chronic | ICD-10-CM

## 2021-09-03 DIAGNOSIS — Z79.84 LONG TERM (CURRENT) USE OF ORAL HYPOGLYCEMIC DRUGS: ICD-10-CM

## 2021-09-03 DIAGNOSIS — Z88.1 ALLERGY STATUS TO OTHER ANTIBIOTIC AGENTS STATUS: ICD-10-CM

## 2021-09-03 DIAGNOSIS — Z87.19 PERSONAL HISTORY OF OTHER DISEASES OF THE DIGESTIVE SYSTEM: ICD-10-CM

## 2021-09-03 DIAGNOSIS — N40.0 BENIGN PROSTATIC HYPERPLASIA WITHOUT LOWER URINARY TRACT SYMPTOMS: ICD-10-CM

## 2021-09-03 DIAGNOSIS — K21.9 GASTRO-ESOPHAGEAL REFLUX DISEASE WITHOUT ESOPHAGITIS: ICD-10-CM

## 2021-09-03 DIAGNOSIS — Z98.890 OTHER SPECIFIED POSTPROCEDURAL STATES: ICD-10-CM

## 2021-09-03 DIAGNOSIS — Z79.01 LONG TERM (CURRENT) USE OF ANTICOAGULANTS: ICD-10-CM

## 2021-09-03 DIAGNOSIS — Z79.02 LONG TERM (CURRENT) USE OF ANTITHROMBOTICS/ANTIPLATELETS: ICD-10-CM

## 2021-09-03 DIAGNOSIS — Z87.09 PERSONAL HISTORY OF OTHER DISEASES OF THE RESPIRATORY SYSTEM: ICD-10-CM

## 2021-09-03 DIAGNOSIS — Z98.49 CATARACT EXTRACTION STATUS, UNSPECIFIED EYE: Chronic | ICD-10-CM

## 2021-09-03 DIAGNOSIS — I48.91 UNSPECIFIED ATRIAL FIBRILLATION: ICD-10-CM

## 2021-09-03 DIAGNOSIS — I10 ESSENTIAL (PRIMARY) HYPERTENSION: ICD-10-CM

## 2021-09-03 DIAGNOSIS — Z79.899 OTHER LONG TERM (CURRENT) DRUG THERAPY: ICD-10-CM

## 2021-09-03 DIAGNOSIS — N20.0 CALCULUS OF KIDNEY: Chronic | ICD-10-CM

## 2021-09-03 PROCEDURE — 99283 EMERGENCY DEPT VISIT LOW MDM: CPT

## 2021-09-03 RX ADMIN — Medication 30 MILLILITER(S): at 09:52

## 2021-09-03 NOTE — ED PROVIDER NOTE - ATTENDING CONTRIBUTION TO CARE
I was present for and supervised the key and critical aspects of the procedures performed during the care of the patient. 76 y M hx of CAD , stents, NIDDM, a.fib on xarelto c/o pill stuck in his throat around 8:30am. pt was taking his daily medications, but 1 pill got stuck in his throat. he tried drinking water to pass the pill but no resolution. pt was afraid to try anything else and came to the ED. Pt tolerating PO, endorses that he did not have a choking or coughing incident. denies throat pain, difficulty breathing, difficulty on phonation, CP. no stridor, no coughing, no drooling.  on exam he is well appearing with no drooling, no vomiting, no fevers or chills he was able to tolerate po I will discharge at this time I was present for and supervised the key and critical aspects of the procedures performed during the care of the patient. 76 y M hx of CAD , stents, NIDDM, a.fib on xarelto c/o pill stuck in his throat around 8:30am. pt was taking his daily medications, but 1 pill got stuck in his throat. he tried drinking water to pass the pill but no resolution. pt was afraid to try anything else and came to the ED. Pt tolerating PO, endorses that he did not have a choking or coughing incident. denies throat pain, difficulty breathing, difficulty on phonation, CP. no stridor, no coughing, no drooling.  on exam he is well appearing with no drooling, no vomiting, no fevers or chills he was able to tolerate po I will discharge at this time.

## 2021-09-03 NOTE — ED PROVIDER NOTE - PROGRESS NOTE DETAILS
TN - PO trial w/ ginger ale; pt endorses that he no longer feels the pill in his throat and believes that it has passed. pt endorses no new SOB, no difficulty phonating, no feelings of foreign body. TN - PO trial w/ ginger ale; pt endorses that he still feels the pill in the same spot. pt endorses no new SOB, no difficulty phonating, no feelings of foreign body. Plan: Maalox trial TN - pt tolerating PO;   after PO trial w/ ginger ale pt endorses that he still feels the pill in the same spot.   pt endorses no new symptoms.   Xrays are NOT indicated at this time as pt is tolerating PO, phonating well, no cough, no choking, no SOB.   Plan: Maalox trial [place] - pt tolerated maalox trial; at time of ingestion states that he does not feel the pill; will reassess in 5 minutes

## 2021-09-03 NOTE — ED PROVIDER NOTE - OBJECTIVE STATEMENT
76 y M hx of CAD , stents, NIDDM, a.fib on xarelto c/o pill stuck in his throat around 8:30am. pt was taking his daily medications, but 1 pill got stuck in his throat. he tried drinking water to pass the pill but no resolution. pt was afraid to try anything else and came to the ED. denies throat pain, difficulty breathing, difficulty on phonation, CP. 76 y M hx of CAD , stents, NIDDM, a.fib on xarelto c/o pill stuck in his throat around 8:30am. pt was taking his daily medications, but 1 pill got stuck in his throat. he tried drinking water to pass the pill but no resolution. pt was afraid to try anything else and came to the ED. Pt tolerating PO, endorses that he did not have a choking or coughing incident. denies throat pain, difficulty breathing, difficulty on phonation, CP. no stridor, no coughing, no drooling.

## 2021-09-03 NOTE — ED PROVIDER NOTE - NSFOLLOWUPINSTRUCTIONS_ED_ALL_ED_FT
Foreign Body Ingestion    WHAT YOU NEED TO KNOW:    What is foreign body ingestion? A foreign body is an object you swallowed. Examples include dental work and button batteries. A foreign body can cause problems as it moves through your digestive system.    What are the signs and symptoms of foreign body ingestion? You may not have any symptoms, or you may have any of the following:  •Drooling or vomiting     •Bloody vomit or rectal bleeding     •Chest pain, abdominal pain, or a feeling that something is stuck    How is foreign body ingestion diagnosed? Your healthcare provider will examine your throat, chest, and abdomen. Tell him what type of object you swallowed and when you swallowed it. He may use any of the following to find the object:   •A barium swallow or other x-rays may be used to check your neck, chest, and abdomen. You will drink thick liquid called barium while healthcare providers take x-rays. Barium helps your esophagus and stomach show up on x-rays.     •A metal detector may be used to look for coins or other metal objects in your body.     •A CT may be used to check for objects in your esophagus or stomach. You may be given contrast liquid to help your stomach show up better. Tell the healthcare provider if you have ever had an allergic reaction to contrast liquid.     •Endoscopy may be used to see the inside of your digestive system. A scope is a long, bendable tube with a light on the end of it. A camera attached to the scope will take pictures.     How is foreign body ingestion treated? Your healthcare provider may want to observe you for 24 hours or longer. Most objects pass through the digestive system and come out in a bowel movement. Objects that are small or smooth will often pass without a problem. Search for the object every time you have a bowel movement.     What can I do to prevent another foreign body ingestion?   •Cut your food into small pieces. Chew your food well before you swallow.     •Make sure dentures or other dental fixtures are secure. You may need to go to the dentist to have dental work repaired.    When should I seek immediate care?   •You have a fever.    •You have severe abdominal pain, nausea, or vomiting.     •Your vomit or saliva is bloody.    •Your bowel movements are black or bloody.     When should I contact my healthcare provider?   •You do not find the object in your bowel movement within 2 or 3 days.    •You do not want to eat because of abdominal pain or vomiting.    •You are drooling or hoarse.    •You have questions or concerns about your condition or care.    CARE AGREEMENT:    You have the right to help plan your care. Learn about your health condition and how it may be treated. Discuss treatment options with your healthcare providers to decide what care you want to receive. You always have the right to refuse treatment

## 2021-09-03 NOTE — ED PROVIDER NOTE - PATIENT PORTAL LINK FT
You can access the FollowMyHealth Patient Portal offered by [facility] by registering at the following website: [email or web address]. By joining [employer]’s FollowMyHealth portal, you will also be able to view your health information using other applications (apps) compatible with our system. You can access the FollowMyHealth Patient Portal offered by [facility] by registering at the following website: [email or web address]. By joining [employer]’s FollowMyHealth portal, you will also be able to view your health information using other applications (apps) compatible with our system.

## 2021-09-03 NOTE — ED PROVIDER NOTE - CLINICAL SUMMARY MEDICAL DECISION MAKING FREE TEXT BOX
patient now asymptomatic with no drooling, no wheezing no sob no vomiting he is tolerating po food and fluid   I will discharge at this time.  we discussed indications to return

## 2021-09-03 NOTE — ED PROVIDER NOTE - PHYSICAL EXAMINATION
CONSTITUTIONAL: Well-appearing; well-nourished; in no apparent distress.   HEAD: Normocephalic; atraumatic.   EYES: PERRL; EOM intact. Conjunctiva normal B/L.   ENT: Normal pharynx with no tonsillar hypertrophy. MMM. no foreign bodies visualized in oropharynx  NECK: Supple; non-tender; no cervical lymphadenopathy.   CHEST: Normal chest excursion with respiration.   CARDIOVASCULAR: Normal S1, S2; no murmurs, rubs, or gallops.   RESPIRATORY: Normal chest excursion with respiration; breath sounds clear and equal bilaterally; no wheezes, rhonchi, or rales.  GI/: Normal bowel sounds; non-distended; non-tender.  BACK: No evidence of trauma or deformity. Non-tender to palpation. No CVA tenderness.   EXT: Normal ROM in all four extremities; non-tender to palpation; distal pulses are normal. No leg edema B/L.   SKIN: Normal for age and race; warm; dry; good turgor.  NEURO: A & O x 4; CN 2-12 intact. Grossly unremarkable. CONSTITUTIONAL: Well-appearing; well-nourished; in no apparent distress.   HEAD: Normocephalic; atraumatic.   EYES: PERRL; EOM intact. Conjunctiva normal B/L.   ENT: Normal pharynx with no tonsillar hypertrophy. MMM. no foreign bodies visualized in oropharynx  NECK: Supple; non-tender; no cervical lymphadenopathy.   CHEST: Normal chest excursion with respiration.   CARDIOVASCULAR: Normal S1, S2; no murmurs, rubs, or gallops.   RESPIRATORY: Normal chest excursion with respiration; breath sounds clear and equal bilaterally; no wheezes, rhonchi, or rales. no stridor, no coughing, no drooling  GI/: Normal bowel sounds; non-distended; non-tender.  BACK: No evidence of trauma or deformity. Non-tender to palpation. No CVA tenderness.   EXT: Normal ROM in all four extremities; non-tender to palpation; distal pulses are normal. No leg edema B/L.   SKIN: Normal for age and race; warm; dry; good turgor.  NEURO: A & O x 4; CN 2-12 intact. Grossly unremarkable.

## 2021-09-04 ENCOUNTER — RX RENEWAL (OUTPATIENT)
Age: 76
End: 2021-09-04

## 2021-09-30 ENCOUNTER — RX RENEWAL (OUTPATIENT)
Age: 76
End: 2021-09-30

## 2021-10-27 ENCOUNTER — RX RENEWAL (OUTPATIENT)
Age: 76
End: 2021-10-27

## 2021-10-30 ENCOUNTER — RX RENEWAL (OUTPATIENT)
Age: 76
End: 2021-10-30

## 2021-11-09 ENCOUNTER — RESULT CHARGE (OUTPATIENT)
Age: 76
End: 2021-11-09

## 2021-11-09 ENCOUNTER — APPOINTMENT (OUTPATIENT)
Dept: CARDIOLOGY | Facility: CLINIC | Age: 76
End: 2021-11-09
Payer: MEDICARE

## 2021-11-09 VITALS
TEMPERATURE: 98.7 F | WEIGHT: 285 LBS | BODY MASS INDEX: 39.9 KG/M2 | SYSTOLIC BLOOD PRESSURE: 138 MMHG | DIASTOLIC BLOOD PRESSURE: 84 MMHG | HEIGHT: 71 IN | HEART RATE: 73 BPM | OXYGEN SATURATION: 98 %

## 2021-11-09 DIAGNOSIS — Z00.00 ENCOUNTER FOR GENERAL ADULT MEDICAL EXAMINATION W/OUT ABNORMAL FINDINGS: ICD-10-CM

## 2021-11-09 PROCEDURE — 93000 ELECTROCARDIOGRAM COMPLETE: CPT

## 2021-11-09 PROCEDURE — 99213 OFFICE O/P EST LOW 20 MIN: CPT | Mod: CS

## 2021-11-09 RX ORDER — EZETIMIBE AND SIMVASTATIN 10; 40 MG/1; MG/1
10-40 TABLET ORAL
Refills: 0 | Status: DISCONTINUED | COMMUNITY
End: 2021-11-09

## 2021-11-09 RX ORDER — AMIODARONE HYDROCHLORIDE 200 MG/1
200 TABLET ORAL DAILY
Qty: 90 | Refills: 3 | Status: DISCONTINUED | COMMUNITY
Start: 2020-10-19 | End: 2021-11-09

## 2021-11-09 RX ORDER — ENALAPRIL MALEATE 2.5 MG/1
2.5 TABLET ORAL DAILY
Refills: 0 | Status: DISCONTINUED | COMMUNITY
End: 2021-11-09

## 2021-12-14 ENCOUNTER — APPOINTMENT (OUTPATIENT)
Dept: PEDIATRIC CARDIOLOGY | Facility: CLINIC | Age: 76
End: 2021-12-14

## 2022-02-11 ENCOUNTER — APPOINTMENT (OUTPATIENT)
Dept: CARDIOLOGY | Facility: CLINIC | Age: 77
End: 2022-02-11

## 2022-03-25 ENCOUNTER — APPOINTMENT (OUTPATIENT)
Dept: CARDIOLOGY | Facility: CLINIC | Age: 77
End: 2022-03-25
Payer: MEDICARE

## 2022-03-25 DIAGNOSIS — I48.0 PAROXYSMAL ATRIAL FIBRILLATION: ICD-10-CM

## 2022-03-25 PROCEDURE — 93000 ELECTROCARDIOGRAM COMPLETE: CPT | Mod: 59

## 2022-03-25 PROCEDURE — 99214 OFFICE O/P EST MOD 30 MIN: CPT

## 2022-03-27 PROBLEM — I48.0 PAROXYSMAL ATRIAL FIBRILLATION: Status: ACTIVE | Noted: 2019-09-19

## 2022-03-29 ENCOUNTER — RX RENEWAL (OUTPATIENT)
Age: 77
End: 2022-03-29

## 2022-04-16 NOTE — HISTORY OF PRESENT ILLNESS
[FreeTextEntry1] : Patient with history DVT, ETELVINA t HTN, DM, PCI, CAD, HLD seizures and kidney stone. PAF  CHADVASC 4.  Patient had  h/o syncope ,Work up was negative h/o DCCV  guided PHILIP in 2020\par \par Presents for routine follow up\par Denies any sob, STRINGER, PND, orthopnea, no palpitations, no dizziness,lightheadedness, denies chest pains\par \par \par EKG ( 3/25/2022) 80bpm sinus rhythm\par ECHO ( 4/13/2021) EF 50%\par Cardiologist: Dr. Rizzo\par \par

## 2022-04-16 NOTE — PHYSICAL EXAM
[General Appearance - Well Developed] : well developed [Normal Appearance] : normal appearance [Well Groomed] : well groomed [General Appearance - Well Nourished] : well nourished [No Deformities] : no deformities [General Appearance - In No Acute Distress] : no acute distress [Normal Conjunctiva] : the conjunctiva exhibited no abnormalities [Eyelids - No Xanthelasma] : the eyelids demonstrated no xanthelasmas [No Oral Cyanosis] : no oral cyanosis [No Oral Pallor] : no oral pallor [Respiration, Rhythm And Depth] : normal respiratory rhythm and effort [Heart Rate And Rhythm] : heart rate and rhythm were normal [Heart Sounds] : normal S1 and S2 [Abdomen Soft] : soft [Arterial Pulses Normal] : the arterial pulses were normal [Abnormal Walk] : normal gait [Nail Clubbing] : no clubbing of the fingernails [Cyanosis, Localized] : no localized cyanosis [Petechial Hemorrhages (___cm)] : no petechial hemorrhages [Skin Color & Pigmentation] : normal skin color and pigmentation [] : no rash [Oriented To Time, Place, And Person] : oriented to person, place, and time [Affect] : the affect was normal [Mood] : the mood was normal [No Anxiety] : not feeling anxious [FreeTextEntry1] : No JVD

## 2022-04-16 NOTE — ASSESSMENT
[FreeTextEntry1] : 76 years old with PMH PAF- had dccv in 2020 was on amiodarone and  xarelto\par  h/o stent placement in the LAD\par Amiodarone was discontinued on 8/2021\par \par Presents today for routine follow up- no Afib\par \par AP\par PAF - continue  xarelto 20mg daily - he denies any bleeding\par          continue metoprolol tartrate 50mg twice daily\par F/up with Dr. Rizzo in May\par \par RTO in 6 months time with MK\par \par \par I was physically present for the key portion of the evaluation and management service provided. I agree with the above history, physical and plan which I have reviewed and edited where appropriate.\par \par

## 2022-04-18 ENCOUNTER — RX RENEWAL (OUTPATIENT)
Age: 77
End: 2022-04-18

## 2022-05-07 ENCOUNTER — NON-APPOINTMENT (OUTPATIENT)
Age: 77
End: 2022-05-07

## 2022-05-09 ENCOUNTER — APPOINTMENT (OUTPATIENT)
Dept: CARDIOLOGY | Facility: CLINIC | Age: 77
End: 2022-05-09

## 2022-05-14 ENCOUNTER — NON-APPOINTMENT (OUTPATIENT)
Age: 77
End: 2022-05-14

## 2022-05-18 ENCOUNTER — APPOINTMENT (OUTPATIENT)
Dept: CARDIOLOGY | Facility: CLINIC | Age: 77
End: 2022-05-18

## 2022-06-16 ENCOUNTER — APPOINTMENT (OUTPATIENT)
Age: 77
End: 2022-06-16
Payer: MEDICARE

## 2022-06-16 VITALS
WEIGHT: 284 LBS | DIASTOLIC BLOOD PRESSURE: 76 MMHG | OXYGEN SATURATION: 97 % | RESPIRATION RATE: 14 BRPM | SYSTOLIC BLOOD PRESSURE: 128 MMHG | BODY MASS INDEX: 39.76 KG/M2 | HEART RATE: 73 BPM | HEIGHT: 71 IN

## 2022-06-16 PROCEDURE — 99214 OFFICE O/P EST MOD 30 MIN: CPT

## 2022-06-16 NOTE — ASSESSMENT
[FreeTextEntry1] : A:\par ETELVINA\par Obesity\par PLAN:\par The patient was benefitting from the PAP device .  He states he is going to restart using CPAP unit\par New supplies ordered \par Weight loss discussed\par I stressed the need maintain compliance  with the PAP device.\par The patient is not to use an Ozone or UV sterilizer. \par F/U in 6 months\par \par A:\par Asthma:\par plan:\par Stress compliance with inhalers. Renewed as needed.\par cont PRN albuterol\par cont ICS/LABA\par needs PFT\par weight loss\par F/U 6 months\par

## 2022-06-16 NOTE — REASON FOR VISIT
[Follow-Up] : a follow-up visit [Asthma] : asthma [Sleep Apnea] : sleep apnea [Obesity] : obesity [TextBox_44] : Patient was doing well using the CPAP but after the  recall the patient elected to discontinue use.  I had a long discussion with the patient and his wife today in the office I feel that he is at a bigger cardiovascular risk by discontinuing use of the CPAP.  I explained the reasoning why.

## 2022-07-07 NOTE — H&P PST ADULT - MEDICATION ADMINISTRATION INFO, PROFILE
Subjective:     Linette Garcia  is a 52 y.o. female who presents for follow-up about 3.5 years following laparoscopic sleeve gastrectomy. She has lost a total of 101 pounds since surgery. Body mass index is 25.6 kg/m². . EBWL is (86%). The patient presents today to assess their progress toward their goal of weight loss and to address any issues that may be present. Today the patient and I have reviewed their diet and how appropriate their food choices are. The following issues have been identified  - none from a surgical standpoint. Surgery related complication: NA       She reports no real issues and denies vomiting, abdominal pain, difficulty swallowing, nausea and reflux. The patients diet choices have been reviewed today and counseling was given. Fluid intake: fair, 50 oz      Protein intake: occ, shakes; tolerating all proteins      Meals/day: 5    Patients pain score:0/10    The patient's exercise level: exercises 3 times weekly for 90 minutes doing treadmill, bike, walking. Changes in her medical history and medications have been reviewed. Had panniculectomy done by Dr Cordelia Rizvi May 2022. Had recent ED visit for abscess from her HS. Comorbidities:    Hypertension: not applicable  Diabetes: not applicable  Obstructive Sleep Apnea: not applicable  Hyperlipidemia: not applicable  Stress Urinary Incontinence: not applicable  Gastroesophageal Reflux: not applicable  Weight related arthropathy: resolved    Patient Active Problem List   Diagnosis Code    Depression F32. 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    Rash/skin eruption R21    Intertriginous dermatitis associated with moisture L30.4     Past Medical History:   Diagnosis Date    'light-for-dates' infant with signs of fetal malnutrition     Anxiety     Asthma     sporadic use of inhaler- asthmatic bronchitis     Depression     Fibromyalgia     Functional dyspepsia     Hydradenitis     Morbid obesity (HCC)     Morbid obesity with body mass index of 40.0-49.9 (HCC)     OA (osteoarthritis)     Patellofemoral pain syndrome of both knees     Plantar fasciitis of right foot     Vertigo      Past Surgical History:   Procedure Laterality Date    DELIVERY       X 2    HC TOTAL HYSTERECTOMY      HX HEENT      Exc salivary gland    HX LIPECTOMY  2022    HX ORTHOPAEDIC      exc mass foot    HX OTHER SURGICAL      multiple resections of various Hydradenitis areas    HX TONSILLECTOMY      HX TUBAL LIGATION      [place] ABDOMEN SURGERY PROC UNLISTED Right 2008    groin skin excision for hydradenitis     [place] LAP, MARI RESTRICT PROC, LONGITUDINAL GASTRECTOMY  2019    Dr. Dickson Castillo     Current Outpatient Medications   Medication Sig Dispense Refill    ibuprofen (MOTRIN) 800 mg tablet ibuprofen 800 mg tablet      meclizine (ANTIVERT) 25 mg tablet meclizine 25 mg tablet      SUMAtriptan (IMITREX) 100 mg tablet sumatriptan 100 mg tablet   TAKE 1 TABLET BY MOUTH AT THE ONSET OF MIGRAINE AS NEEDED. MAY REPEAT 2 HOURS LATER      ergocalciferol (Vitamin D2) 1,250 mcg (50,000 unit) capsule Take 1 Capsule by mouth every seven (7) days for 52 doses. Indications: vitamin D deficiency (high dose therapy) 26 Capsule 1    calcium citrate/vitamin D3 (CALCIUM CITRATE + D PO) Take 1 Tablet by mouth daily.  montelukast (Singulair) 10 mg tablet Take 10 mg by mouth daily.  gabapentin (NEURONTIN) 100 mg capsule Take 100 mg by mouth nightly.  naproxen sodium (NAPROSYN) 220 mg tablet Take 440 mg by mouth two (2) times daily as needed.  nystatin (MYCOSTATIN) topical cream Apply  to affected area two (2) times a day. 30 g 2    multivitamin with minerals (HAIR,SKIN AND NAILS PO) Take 2 Tablets by mouth daily. gummies      multivitamin (ONE A DAY) tablet Take 2 Tablets by mouth daily.  gummies Review of Symptoms:       General - No history or complaints of unexpected fever or chills  Head/Neck - No history or complaints of headache or dizziness  Cardiac - No history or complaints of chest pain, palpitations, or shortness of breath  Pulmonary - No history or complaints of shortness of breath or productive cough  Gastrointestinal - as noted above  Genitourinary - No history or complaints of hematuria/dysuria or renal lithiasis  Musculoskeletal - No history or complaints of joint  muscular weakness  Hematologic - No history of any bleeding episodes  Neurologic - No history or complaints of  migraine headaches or neurologic symptoms                     Objective:     Visit Vitals  /72 (BP 1 Location: Left upper arm, BP Patient Position: Sitting, BP Cuff Size: Adult long)   Pulse 76   Temp 97.3 °F (36.3 °C)   Ht 5' 3\" (1.6 m)   Wt 65.5 kg (144 lb 8 oz)   SpO2 100%   BMI 25.60 kg/m²        Physical Exam:      General appearance:  alert, cooperative, no distress, appears stated age   Mental status   alert, oriented to person, place, and time   Neck  supple, no significant adenopathy     Lymphatics  no palpable lymphadenopathy, no hepatosplenomegaly   Chest  clear to auscultation, no wheezes, rales or rhonchi, symmetric air entry   Heart  normal rate, regular rhythm, normal S1, S2, no murmurs, rubs, clicks or gallops    Abdomen: soft, nontender, nondistended, no masses or organomegaly   Incision:  Well healed, no hernias      Neurological  alert, oriented, normal speech, no focal findings or movement disorder noted   Musculoskeletal no joint tenderness, deformity or swelling   Extremities peripheral pulses normal, no pedal edema, no clubbing or cyanosis   Skin normal coloration and turgor, no rashes, no suspicious skin lesions noted          Lab Results   Component Value Date/Time    WBC 6.3 08/24/2021 04:00 PM    HGB 11.5 (L) 04/28/2022 02:06 PM    HCT 36.6 04/28/2022 02:06 PM    PLATELET 656 08/24/2021 04:00 PM    MCV 84 08/24/2021 04:00 PM     Lab Results   Component Value Date/Time    Sodium 141 08/24/2021 04:00 PM    Potassium 4.6 08/24/2021 04:00 PM    Chloride 104 08/24/2021 04:00 PM    CO2 28 08/24/2021 04:00 PM    Anion gap 9.0 08/24/2021 04:00 PM    Glucose 88 08/24/2021 04:00 PM    BUN 12 08/24/2021 04:00 PM    Creatinine 0.7 08/24/2021 04:00 PM    BUN/Creatinine ratio 15 04/08/2018 10:00 PM    GFR est AA >60 04/08/2018 10:00 PM    GFR est non-AA >60 04/08/2018 10:00 PM    Calcium 9.7 08/24/2021 04:00 PM    Bilirubin, total 0.1 (L) 08/24/2021 04:00 PM    Alk. phosphatase 98 08/24/2021 04:00 PM    Protein, total 7.7 08/24/2021 04:00 PM    Albumin 4.6 08/24/2021 04:00 PM    Globulin 3.1 08/24/2021 04:00 PM    A-G Ratio 1.5 08/24/2021 04:00 PM    ALT (SGPT) 14 08/24/2021 04:00 PM     Lab Results   Component Value Date/Time    Iron 67 08/24/2021 04:00 PM    Ferritin 144 08/24/2021 04:00 PM     Lab Results   Component Value Date/Time    Folate 15.00 08/24/2021 04:00 PM     Lab Results   Component Value Date/Time    VITAMIN D, 25-HYDROXY 29.0 (L) 08/24/2021 04:00 PM       Reviewed labs in [facility] from 4/1/22:  B12 >2000  Folate 7.02  Vitamin D 30.8  Hgb 11.1      Assessment and Plan:   1. Intestinal malabsorption  a. continue required Vitamins: B12, B complex, D, iron, calcium, multivitamin  2. S/p laparoscopic bariatric surgery, SLEEVE GASTRECTOMY, history of morbid obesity. Has done well with maintaining her weight loss and transitioned to maintenance phase. Gave her handout to reinforce dietary guidelines we reviewed today. a. Sleep goal is 7-9 hours each night. Patient education given on the effects of sleep deprivation on weight control. b. Discussed patients weight loss goals and dietary choices in relation to goals. c. Reminded to measure portions, continue high protein, low carbohydrate diet. Reminded to eat regularly, to eat slowly & not to drink with meals.     d. Continue cardio exercise and add resistance exercises. 60-90 minutes of aerobic activity 5 days a week and strength training 2 days each week. e. Encouraged to attend support group   f. Required fluid intake is >64oz daily of decaffeinated sugar free beverages. 3. Hypovitaminosis D - sent weekly D2 to pharmacy  4. Anemia - check iron and ferritin    Labs ordered today  Follow up in 6 months or sooner if patient has questions, concerns or worsening of condition, if unable to reach our office, patient should report to the ED. Ms. Shawna Dickson has a reminder for a \"due or due soon\" health maintenance. I have asked that she contact her primary care provider for a follow-up on this health maintenance. Total time spent with the patient 30 minutes. no concerns

## 2022-07-20 ENCOUNTER — APPOINTMENT (OUTPATIENT)
Dept: ORTHOPEDIC SURGERY | Facility: CLINIC | Age: 77
End: 2022-07-20

## 2022-07-20 DIAGNOSIS — M75.100 UNSPECIFIED ROTATOR CUFF TEAR OR RUPTURE OF UNSPECIFIED SHOULDER, NOT SPECIFIED AS TRAUMATIC: ICD-10-CM

## 2022-07-20 PROCEDURE — 20611 DRAIN/INJ JOINT/BURSA W/US: CPT

## 2022-07-20 PROCEDURE — 99214 OFFICE O/P EST MOD 30 MIN: CPT | Mod: 25

## 2022-07-20 NOTE — REASON FOR VISIT
[FreeTextEntry2] : Left shoulder surgery for a supraspinatus surface supinators cuff tear in 2018 fell going up the stairs new MRI showing an my reading a partial subscap tear subluxation of the biceps

## 2022-07-20 NOTE — PHYSICAL EXAM
[FreeTextEntry3] :  left shoulder had active assisted range of motion to 160° pain weakness with cuff resistance and lift-off and Nobles's

## 2022-07-20 NOTE — ASSESSMENT
[FreeTextEntry1] :  recommend physical therapy for strengthening of the cuff and deltoid as well as injection this is done ultrasound-guided under sterile conditions he tolerated well\par Procedure Name: Large Joint Injection / Aspiration: Dexamethasone, Lidocaine Ultrasound and Guidance\par Large Joint Injection was performed because of pain. Anesthesia: ethyl chloride sprayed topically.. Dexamethasone 1 cc. Needle size: 22 gauge. 1.5 inch.\par Lidocaine: 2 cc. Needle size: 22 gauge , 1.5 inch.\par \par Medication was injected in the joint. After verbal consent using sterile preparation and technique. The risks, benefits, and alternatives to cortisone injection were explained in full to the patient. Risks outlined include but are not limited to infection, sepsis, bleeding, scarring, skin discoloration, temporary increase in pain, syncopal episode, failure to resolve symptoms, allergic reaction, symptom recurrence, and elevation of blood sugar in diabetics. Patient understood the risks. All questions were answered. After discussion of options, patient requested an injection. Oral informed consent was obtained and sterile prep was done of the injection site. Sterile technique was utilized for the procedure including the preparation of the solutions used for the injection. Patient tolerated the procedure well. Advised to ice the injection site this evening. Prep with alcohol locally to site. Sterile technique used. Post Procedure Instructions: \par \par Ultrasound Guidance was used for the following reasons: To visualize the needle in the joint.\par \par visualization of the needle and placement of injection was performed without complication.\par

## 2022-08-05 ENCOUNTER — APPOINTMENT (OUTPATIENT)
Dept: ORTHOPEDIC SURGERY | Facility: CLINIC | Age: 77
End: 2022-08-05

## 2022-08-05 DIAGNOSIS — M25.562 PAIN IN LEFT KNEE: ICD-10-CM

## 2022-08-05 DIAGNOSIS — G89.29 PAIN IN LEFT KNEE: ICD-10-CM

## 2022-08-05 PROCEDURE — 99213 OFFICE O/P EST LOW 20 MIN: CPT

## 2022-08-05 NOTE — ASSESSMENT
[FreeTextEntry1] :   49-year-old gentleman , somewhat overweight status post knee contusion exacerbating mild to moderate knee arthritis.  Agree with his plan for hyaluronic acid injections.  Will have him check in with us in 3 months time for repeat evaluation with repeat weight-bearing bilateral knee radiographs.  Right now I do not see a surgical indication for the patient.  We talked about weight loss which I think would significantly help his symptoms.  Questions answered to his satisfaction.

## 2022-08-05 NOTE — HISTORY OF PRESENT ILLNESS
[de-identified] :  76-year-old gentleman returns for interval follow-up of a contuse left knee with pre-existing chronic left knee pain.  He is currently receiving hyaluronic acid injections by a physiatrist in [place] and has had 1 injection.  He has been worked up extensively with radiographs and subsequently an MRI results which suggested focal cartilage loss on the lateral aspects of his knee.  My MRI reading of suggests that he also has a degenerative medial meniscal tear.  He localizes the pain to the anterior medial aspect of his knee and has most of his pain when he has to get up from seated position.  He denies any new trauma.  He has been taking topical diclofenac for the pain which may may not be helping him.  He is unable to take oral NSAIDs because of a cardiac condition.  He walks in without assistive devices.  But gradually notes that his knee pain may have been better since his prior contusion occurred 9 months ago.

## 2022-08-05 NOTE — IMAGING
[de-identified] :   Late middle-aged gentleman moderately overweight walks into the office with no antalgia.  She able get up on the exam table without difficulty.  He localizes pain by history to the anterior medial aspect of his knee.\par \par Physical examination:  Moderate synovial thickening.  No significant effusion.  Mildly abnormal patellofemoral grind test.  Mild joint line tenderness medially and laterally.  No geniculate lymph nodes or masses.  No instability varus valgus stress testing.  Negative Lachman test.  Knee motion:  0-120 degrees.\par \par Radiographs from October 20, 2021 of the left knee reviewed and demonstrate no significant joint space narrowing, no subchondral sclerotic changes no cystic changes no periarticular erosive changes.  Medial joint space 5.5 mm.\par \par MRI of left knee reviewed from March 9, 2022.  Radiology report notes lateral femoral condylar articular cartilage injury.  My review of the MRI also suggested degenerative posterior horn medial meniscal tear.

## 2022-08-09 ENCOUNTER — APPOINTMENT (OUTPATIENT)
Dept: CARDIOLOGY | Facility: CLINIC | Age: 77
End: 2022-08-09

## 2022-08-09 VITALS
HEIGHT: 71 IN | WEIGHT: 289 LBS | BODY MASS INDEX: 40.46 KG/M2 | SYSTOLIC BLOOD PRESSURE: 140 MMHG | DIASTOLIC BLOOD PRESSURE: 78 MMHG

## 2022-08-09 PROCEDURE — 93306 TTE W/DOPPLER COMPLETE: CPT

## 2022-08-09 PROCEDURE — 99213 OFFICE O/P EST LOW 20 MIN: CPT

## 2022-08-09 PROCEDURE — 93000 ELECTROCARDIOGRAM COMPLETE: CPT

## 2022-08-23 ENCOUNTER — EMERGENCY (EMERGENCY)
Facility: HOSPITAL | Age: 77
LOS: 0 days | Discharge: HOME | End: 2022-08-24
Attending: EMERGENCY MEDICINE | Admitting: EMERGENCY MEDICINE

## 2022-08-23 VITALS
RESPIRATION RATE: 17 BRPM | DIASTOLIC BLOOD PRESSURE: 84 MMHG | TEMPERATURE: 99 F | OXYGEN SATURATION: 96 % | SYSTOLIC BLOOD PRESSURE: 174 MMHG | HEIGHT: 71 IN | WEIGHT: 285.06 LBS | HEART RATE: 99 BPM

## 2022-08-23 DIAGNOSIS — Z79.01 LONG TERM (CURRENT) USE OF ANTICOAGULANTS: ICD-10-CM

## 2022-08-23 DIAGNOSIS — Z87.19 PERSONAL HISTORY OF OTHER DISEASES OF THE DIGESTIVE SYSTEM: ICD-10-CM

## 2022-08-23 DIAGNOSIS — E78.00 PURE HYPERCHOLESTEROLEMIA, UNSPECIFIED: ICD-10-CM

## 2022-08-23 DIAGNOSIS — E03.9 HYPOTHYROIDISM, UNSPECIFIED: ICD-10-CM

## 2022-08-23 DIAGNOSIS — Z98.890 OTHER SPECIFIED POSTPROCEDURAL STATES: Chronic | ICD-10-CM

## 2022-08-23 DIAGNOSIS — Z88.1 ALLERGY STATUS TO OTHER ANTIBIOTIC AGENTS STATUS: ICD-10-CM

## 2022-08-23 DIAGNOSIS — M54.2 CERVICALGIA: ICD-10-CM

## 2022-08-23 DIAGNOSIS — E11.9 TYPE 2 DIABETES MELLITUS WITHOUT COMPLICATIONS: ICD-10-CM

## 2022-08-23 DIAGNOSIS — Z79.84 LONG TERM (CURRENT) USE OF ORAL HYPOGLYCEMIC DRUGS: ICD-10-CM

## 2022-08-23 DIAGNOSIS — I25.10 ATHEROSCLEROTIC HEART DISEASE OF NATIVE CORONARY ARTERY WITHOUT ANGINA PECTORIS: ICD-10-CM

## 2022-08-23 DIAGNOSIS — I48.91 UNSPECIFIED ATRIAL FIBRILLATION: ICD-10-CM

## 2022-08-23 DIAGNOSIS — N40.0 BENIGN PROSTATIC HYPERPLASIA WITHOUT LOWER URINARY TRACT SYMPTOMS: ICD-10-CM

## 2022-08-23 DIAGNOSIS — E66.9 OBESITY, UNSPECIFIED: ICD-10-CM

## 2022-08-23 DIAGNOSIS — I10 ESSENTIAL (PRIMARY) HYPERTENSION: ICD-10-CM

## 2022-08-23 DIAGNOSIS — Z87.442 PERSONAL HISTORY OF URINARY CALCULI: ICD-10-CM

## 2022-08-23 DIAGNOSIS — Z98.49 CATARACT EXTRACTION STATUS, UNSPECIFIED EYE: Chronic | ICD-10-CM

## 2022-08-23 DIAGNOSIS — N20.0 CALCULUS OF KIDNEY: Chronic | ICD-10-CM

## 2022-08-23 DIAGNOSIS — I25.10 ATHEROSCLEROTIC HEART DISEASE OF NATIVE CORONARY ARTERY WITHOUT ANGINA PECTORIS: Chronic | ICD-10-CM

## 2022-08-23 DIAGNOSIS — G47.33 OBSTRUCTIVE SLEEP APNEA (ADULT) (PEDIATRIC): ICD-10-CM

## 2022-08-23 DIAGNOSIS — M43.6 TORTICOLLIS: ICD-10-CM

## 2022-08-23 DIAGNOSIS — Z86.718 PERSONAL HISTORY OF OTHER VENOUS THROMBOSIS AND EMBOLISM: ICD-10-CM

## 2022-08-23 DIAGNOSIS — M25.512 PAIN IN LEFT SHOULDER: ICD-10-CM

## 2022-08-23 LAB
ALBUMIN SERPL ELPH-MCNC: 4.2 G/DL — SIGNIFICANT CHANGE UP (ref 3.5–5.2)
ALP SERPL-CCNC: 61 U/L — SIGNIFICANT CHANGE UP (ref 30–115)
ALT FLD-CCNC: 30 U/L — SIGNIFICANT CHANGE UP (ref 0–41)
ANION GAP SERPL CALC-SCNC: 11 MMOL/L — SIGNIFICANT CHANGE UP (ref 7–14)
AST SERPL-CCNC: 28 U/L — SIGNIFICANT CHANGE UP (ref 0–41)
BASOPHILS # BLD AUTO: 0.01 K/UL — SIGNIFICANT CHANGE UP (ref 0–0.2)
BASOPHILS NFR BLD AUTO: 0.2 % — SIGNIFICANT CHANGE UP (ref 0–1)
BILIRUB SERPL-MCNC: 0.3 MG/DL — SIGNIFICANT CHANGE UP (ref 0.2–1.2)
BUN SERPL-MCNC: 18 MG/DL — SIGNIFICANT CHANGE UP (ref 10–20)
CALCIUM SERPL-MCNC: 9.7 MG/DL — SIGNIFICANT CHANGE UP (ref 8.5–10.1)
CHLORIDE SERPL-SCNC: 101 MMOL/L — SIGNIFICANT CHANGE UP (ref 98–110)
CO2 SERPL-SCNC: 27 MMOL/L — SIGNIFICANT CHANGE UP (ref 17–32)
CREAT SERPL-MCNC: 1 MG/DL — SIGNIFICANT CHANGE UP (ref 0.7–1.5)
EGFR: 79 ML/MIN/1.73M2 — SIGNIFICANT CHANGE UP
EOSINOPHIL # BLD AUTO: 0.07 K/UL — SIGNIFICANT CHANGE UP (ref 0–0.7)
EOSINOPHIL NFR BLD AUTO: 1.5 % — SIGNIFICANT CHANGE UP (ref 0–8)
GLUCOSE SERPL-MCNC: 233 MG/DL — HIGH (ref 70–99)
HCT VFR BLD CALC: 35.5 % — LOW (ref 42–52)
HGB BLD-MCNC: 11.7 G/DL — LOW (ref 14–18)
IMM GRANULOCYTES NFR BLD AUTO: 0.2 % — SIGNIFICANT CHANGE UP (ref 0.1–0.3)
LYMPHOCYTES # BLD AUTO: 1.39 K/UL — SIGNIFICANT CHANGE UP (ref 1.2–3.4)
LYMPHOCYTES # BLD AUTO: 30.4 % — SIGNIFICANT CHANGE UP (ref 20.5–51.1)
MAGNESIUM SERPL-MCNC: 1.6 MG/DL — LOW (ref 1.8–2.4)
MCHC RBC-ENTMCNC: 27.5 PG — SIGNIFICANT CHANGE UP (ref 27–31)
MCHC RBC-ENTMCNC: 33 G/DL — SIGNIFICANT CHANGE UP (ref 32–37)
MCV RBC AUTO: 83.3 FL — SIGNIFICANT CHANGE UP (ref 80–94)
MONOCYTES # BLD AUTO: 0.45 K/UL — SIGNIFICANT CHANGE UP (ref 0.1–0.6)
MONOCYTES NFR BLD AUTO: 9.8 % — HIGH (ref 1.7–9.3)
NEUTROPHILS # BLD AUTO: 2.64 K/UL — SIGNIFICANT CHANGE UP (ref 1.4–6.5)
NEUTROPHILS NFR BLD AUTO: 57.9 % — SIGNIFICANT CHANGE UP (ref 42.2–75.2)
NRBC # BLD: 0 /100 WBCS — SIGNIFICANT CHANGE UP (ref 0–0)
PLATELET # BLD AUTO: 121 K/UL — LOW (ref 130–400)
POTASSIUM SERPL-MCNC: 4.4 MMOL/L — SIGNIFICANT CHANGE UP (ref 3.5–5)
POTASSIUM SERPL-SCNC: 4.4 MMOL/L — SIGNIFICANT CHANGE UP (ref 3.5–5)
PROT SERPL-MCNC: 7 G/DL — SIGNIFICANT CHANGE UP (ref 6–8)
RBC # BLD: 4.26 M/UL — LOW (ref 4.7–6.1)
RBC # FLD: 14.4 % — SIGNIFICANT CHANGE UP (ref 11.5–14.5)
SODIUM SERPL-SCNC: 139 MMOL/L — SIGNIFICANT CHANGE UP (ref 135–146)
TROPONIN T SERPL-MCNC: <0.01 NG/ML — SIGNIFICANT CHANGE UP
WBC # BLD: 4.57 K/UL — LOW (ref 4.8–10.8)
WBC # FLD AUTO: 4.57 K/UL — LOW (ref 4.8–10.8)

## 2022-08-23 PROCEDURE — 93010 ELECTROCARDIOGRAM REPORT: CPT

## 2022-08-23 PROCEDURE — 71045 X-RAY EXAM CHEST 1 VIEW: CPT | Mod: 26

## 2022-08-23 PROCEDURE — 99284 EMERGENCY DEPT VISIT MOD MDM: CPT | Mod: FS

## 2022-08-23 RX ORDER — AMLODIPINE BESYLATE 2.5 MG/1
1 TABLET ORAL
Qty: 0 | Refills: 0 | DISCHARGE

## 2022-08-23 RX ORDER — EZETIMIBE AND SIMVASTATIN 10; 80 MG/1; MG/1
1 TABLET, FILM COATED ORAL
Qty: 0 | Refills: 0 | DISCHARGE

## 2022-08-23 RX ORDER — MORPHINE SULFATE 50 MG/1
4 CAPSULE, EXTENDED RELEASE ORAL ONCE
Refills: 0 | Status: DISCONTINUED | OUTPATIENT
Start: 2022-08-23 | End: 2022-08-23

## 2022-08-23 RX ORDER — AMIODARONE HYDROCHLORIDE 400 MG/1
1 TABLET ORAL
Qty: 0 | Refills: 0 | DISCHARGE

## 2022-08-23 RX ORDER — MAGNESIUM OXIDE 400 MG ORAL TABLET 241.3 MG
400 TABLET ORAL ONCE
Refills: 0 | Status: COMPLETED | OUTPATIENT
Start: 2022-08-23 | End: 2022-08-23

## 2022-08-23 RX ADMIN — MORPHINE SULFATE 4 MILLIGRAM(S): 50 CAPSULE, EXTENDED RELEASE ORAL at 21:58

## 2022-08-23 RX ADMIN — MAGNESIUM OXIDE 400 MG ORAL TABLET 400 MILLIGRAM(S): 241.3 TABLET ORAL at 23:54

## 2022-08-23 NOTE — ED ADULT NURSE NOTE - NSIMPLEMENTINTERV_GEN_ALL_ED
Implemented All Fall Risk Interventions:  [place] to call system. Call bell, personal items and telephone within reach. Instruct patient to call for assistance. Room bathroom lighting operational. Non-slip footwear when patient is off stretcher. Physically safe environment: no spills, clutter or unnecessary equipment. Stretcher in lowest position, wheels locked, appropriate side rails in place. Provide visual cue, wrist band, yellow gown, etc. Monitor gait and stability. Monitor for mental status changes and reorient to person, place, and time. Review medications for side effects contributing to fall risk. Reinforce activity limits and safety measures with patient and family.

## 2022-08-24 VITALS
OXYGEN SATURATION: 99 % | RESPIRATION RATE: 18 BRPM | DIASTOLIC BLOOD PRESSURE: 72 MMHG | HEART RATE: 90 BPM | SYSTOLIC BLOOD PRESSURE: 155 MMHG

## 2022-08-24 RX ADMIN — MORPHINE SULFATE 4 MILLIGRAM(S): 50 CAPSULE, EXTENDED RELEASE ORAL at 00:34

## 2022-08-24 NOTE — ED PROVIDER NOTE - IV ALTEPLASE DOOR HIDDEN
Rn did call the pt to inquire if it was ok to send that report to that office.    Rn did also call Svetlana back and spoke to staff member , who confirmed the pt is an existing pt in their practice.      Rn needed clarification -which Ct of chest was she looking for -there is one scanned from [facility]-dated 8-16-19.  Female staff member asked that Rn fax that over and if that is not what they are requesting, they will call the office back   Rn did fax this report to [phone].   Fax confirmation   Received.    
Svetlana from [facility] called requesting results of CT chest. Fax to [phone]. Svetlana said she got cut off when we transferred her to [facility]. She called back and was wanting to speak with a nurse but I said the nurse was busy. She then stated \" well we just need results.\" I  Told her nurse would fax them to her.  
show

## 2022-08-24 NOTE — ED PROVIDER NOTE - OBJECTIVE STATEMENT
77 yo male, pmh of cad htn, hld, dm, presents to ed for left neck pain, x 4 days mild, aching, some radiation to top of chest. Denies fever, chills, sob, abd pain, nvd, dizziness, syncope.

## 2022-08-24 NOTE — ED PROVIDER NOTE - NSFOLLOWUPCLINICS_GEN_ALL_ED_FT
[facility] ([facility])  Rehabilitation  [address] [place] [address]  Phone: [phone]  Fax:   Follow Up Time: 1-3 Days

## 2022-08-24 NOTE — ED PROVIDER NOTE - PHYSICAL EXAMINATION
Physical Exam    Vital Signs: I have reviewed the initial vital signs.  Constitutional: appears stated age, no acute distress  Eyes: Conjunctiva pink, Sclera clear,   Cardiovascular: S1 and S2, regular rate, regular rhythm, well-perfused extremities, radial pulses equal and 2+, pedal pulses 2+ and equal  Respiratory: unlabored respiratory effort, clear to auscultation bilaterally no wheezing, rales and rhonchi  Gastrointestinal: soft, non-tender abdomen, no pulsatile mass, normal bowl sounds  Musculoskeletal: supple neck, no lower extremity edema, no midline tenderness, + left trapezius ttp  Integumentary: warm, dry, no rash  Neurologic: awake, alert, nvi

## 2022-08-24 NOTE — ED PROVIDER NOTE - CLINICAL SUMMARY MEDICAL DECISION MAKING FREE TEXT BOX
76-year-old male cardiac history presents with right-sided neck "stiffness" started 4 days ago.  Travel to left side of neck today into the left shoulder sharp in nature worse with movement.  No trauma no diaphoresis nausea vomiting.  Does not feel like his previous heart attacks.  No paresthesias numbness of extremities.  TTP left trapezius EKG no ischemia troponin negative CXR unchanged from previous no PTX  Analgesia given and outpatient follow-up  Patient to be discharged from ED in well appearing condition. Any available test results were discussed with and printed  for patient.  Verbal instructions given, including instructions to return to ED immediately for any new, worsening, or concerning symptoms. Limitations of ED work up discussed.  Patient reports understanding of above with capacity and insight. Written discharge instructions additionally given, including follow-up plan.

## 2022-08-24 NOTE — ED PROVIDER NOTE - NS ED ATTENDING STATEMENT MOD
This was a shared visit with the LANIE. I reviewed and verified the documentation and independently performed the documented:

## 2022-08-24 NOTE — ED PROVIDER NOTE - PATIENT PORTAL LINK FT
You can access the FollowMyHealth Patient Portal offered by [facility] by registering at the following website: [email or web address]. By joining [facility]’s FollowMyHealth portal, you will also be able to view your health information using other applications (apps) compatible with our system.

## 2022-09-14 NOTE — ED PROVIDER NOTE - PRIOR EKG STATUS
Writer attempted to call patient;  No answer and was unable to leave a message (continuously rang)    Writer attempted to call daughter, no answer and voicemail left requesting she call back.    the EKG is unchanged from prior EKG

## 2022-09-30 ENCOUNTER — APPOINTMENT (OUTPATIENT)
Dept: CARDIOLOGY | Facility: CLINIC | Age: 77
End: 2022-09-30

## 2022-09-30 VITALS
DIASTOLIC BLOOD PRESSURE: 90 MMHG | WEIGHT: 285 LBS | BODY MASS INDEX: 39.9 KG/M2 | HEIGHT: 71 IN | TEMPERATURE: 98 F | HEART RATE: 77 BPM | SYSTOLIC BLOOD PRESSURE: 160 MMHG | RESPIRATION RATE: 16 BRPM

## 2022-09-30 PROCEDURE — 99214 OFFICE O/P EST MOD 30 MIN: CPT

## 2022-09-30 PROCEDURE — 93000 ELECTROCARDIOGRAM COMPLETE: CPT

## 2022-09-30 RX ORDER — CLOPIDOGREL BISULFATE 75 MG/1
75 TABLET, FILM COATED ORAL
Qty: 90 | Refills: 3 | Status: COMPLETED | COMMUNITY
Start: 2021-08-17 | End: 2022-09-30

## 2022-09-30 RX ORDER — METFORMIN HYDROCHLORIDE 1000 MG/1
1000 TABLET, COATED ORAL DAILY
Refills: 0 | Status: COMPLETED | COMMUNITY
End: 2022-09-30

## 2022-09-30 RX ORDER — METOLAZONE 2.5 MG/1
2.5 TABLET ORAL DAILY
Qty: 90 | Refills: 3 | Status: COMPLETED | COMMUNITY
Start: 2021-02-18 | End: 2022-09-30

## 2022-09-30 RX ORDER — METFORMIN HYDROCHLORIDE 1000 MG/1
1000 TABLET, FILM COATED, EXTENDED RELEASE ORAL TWICE DAILY
Refills: 0 | Status: COMPLETED | COMMUNITY
End: 2022-09-30

## 2022-09-30 NOTE — ADDENDUM
[FreeTextEntry1] : IWillie assisted in documentation on 09/30/2022 acting as a scribe for Dr. Jero Hall.

## 2022-09-30 NOTE — ASSESSMENT
[FreeTextEntry1] : AF\par - Patient in normal sinus rhythm and doing well\par - Continue Xarelto 20mg QD\par - Continue metoprolol 50mg BID\par \par Hypertension\par - Patient's blood pressure normally well controlled however he did not take his medication this morning\par - Patient is following up with cardiology and will recheck his blood pressure then\par - Continue Valsartan 100mg QD\par - Continue amlodipine 2.5 QD\par \par \par \par I, Jero Hall, personally performed the services described in this documentation. All medical record entries made by the scribe/nurse FINA were at my direction and in my presence. I have reviewed the chart and agree that the record reflects my personal performance and is accurate and complete.\par

## 2022-09-30 NOTE — HISTORY OF PRESENT ILLNESS
[FreeTextEntry1] : Patient with history DVT, ETELVINA t HTN, DM, PCI, CAD, HLD seizures and kidney stone. PAF  CHADVASC 4.  Patient had h/o syncope ,Work up was negative h/o DCCV  guided PHILIP in 2020\par \par Presents for routine follow up\par Denies any sob, STRINGER, PND, orthopnea, no palpitations, no dizziness,lightheadedness, denies chest pains\par \par Patient doing well. No palpitations, shows no signs of dyspnea on exertion. \par \par EKG SR 77 BPM\par \par ECHO ( 4/13/2021) EF 50%\par Cardiologist: Dr. Rizzo\par \par

## 2022-09-30 NOTE — PHYSICAL EXAM
[General Appearance - Well Developed] : well developed [Normal Appearance] : normal appearance [Well Groomed] : well groomed [General Appearance - Well Nourished] : well nourished [No Deformities] : no deformities [General Appearance - In No Acute Distress] : no acute distress [Eyelids - No Xanthelasma] : the eyelids demonstrated no xanthelasmas [No Oral Pallor] : no oral pallor [No Oral Cyanosis] : no oral cyanosis [Respiration, Rhythm And Depth] : normal respiratory rhythm and effort [Heart Rate And Rhythm] : heart rate and rhythm were normal [Heart Sounds] : normal S1 and S2 [Arterial Pulses Normal] : the arterial pulses were normal [Abdomen Soft] : soft [Abnormal Walk] : normal gait [Nail Clubbing] : no clubbing of the fingernails [Cyanosis, Localized] : no localized cyanosis [Petechial Hemorrhages (___cm)] : no petechial hemorrhages [Skin Color & Pigmentation] : normal skin color and pigmentation [] : no rash [Oriented To Time, Place, And Person] : oriented to person, place, and time [Affect] : the affect was normal [Mood] : the mood was normal [No Anxiety] : not feeling anxious [Well Developed] : well developed [Well Nourished] : well nourished [No Acute Distress] : no acute distress [Normal Conjunctiva] : normal conjunctiva [Normal Venous Pressure] : normal venous pressure [No Carotid Bruit] : no carotid bruit [Normal S1, S2] : normal S1, S2 [No Murmur] : no murmur [No Rub] : no rub [No Gallop] : no gallop [Clear Lung Fields] : clear lung fields [Good Air Entry] : good air entry [No Respiratory Distress] : no respiratory distress  [Soft] : abdomen soft [Non Tender] : non-tender [No Masses/organomegaly] : no masses/organomegaly [Normal Bowel Sounds] : normal bowel sounds [Normal Gait] : normal gait [No Edema] : no edema [No Cyanosis] : no cyanosis [No Clubbing] : no clubbing [No Varicosities] : no varicosities [No Rash] : no rash [No Skin Lesions] : no skin lesions [Moves all extremities] : moves all extremities [No Focal Deficits] : no focal deficits [Normal Speech] : normal speech [Alert and Oriented] : alert and oriented [Normal memory] : normal memory [FreeTextEntry1] : No JVD

## 2022-10-03 ENCOUNTER — APPOINTMENT (OUTPATIENT)
Dept: ORTHOPEDIC SURGERY | Facility: CLINIC | Age: 77
End: 2022-10-03

## 2022-10-03 PROCEDURE — 99214 OFFICE O/P EST MOD 30 MIN: CPT

## 2022-10-03 PROCEDURE — 73560 X-RAY EXAM OF KNEE 1 OR 2: CPT | Mod: RT

## 2022-10-03 NOTE — HISTORY OF PRESENT ILLNESS
[de-identified] : Patient is here for 2 reasons his shoulder he has a chance to do any therapy has daughters in the hospital with abdominal issues and also had amputation of her toes his MRI from earlier this year showed a retear of the subscap on the report but my reading shows partial tear of the subscap good good range of motion is has done any therapy for the shoulder because he does not have time\par \par His right knee has been bothering for the last 2 months points to the medial aspect he does get a from down a truck and a feels like he jumped on that knee and injured it is giving him pain at night as well as ambulation\par \par X-rays show Kellgren Noah grade 1 medial compartment normal space lateral compartment no fracture no soft tissue calcifications no tumors Right knee two views standing AP and lateral today taken in the office\par \par  he is on blood thinners is a diabetic does not check her sugars can remember his last hemoglobin A1c\par \par  right knee has pain diffusely medially as well as a positive Miriam's on the medial joint line good range of motion ligaments are stable nontender laterally\par \par  recommend an MRI see if this a meniscal tear versus an occult fracture or a bone contusion or AVN along the medial compartment who come back in 2 weeks for that discussion and discuss all options\par \par

## 2022-10-04 ENCOUNTER — APPOINTMENT (OUTPATIENT)
Dept: MRI IMAGING | Facility: CLINIC | Age: 77
End: 2022-10-04

## 2022-10-05 ENCOUNTER — APPOINTMENT (OUTPATIENT)
Dept: MRI IMAGING | Facility: CLINIC | Age: 77
End: 2022-10-05

## 2022-10-05 PROCEDURE — 73721 MRI JNT OF LWR EXTRE W/O DYE: CPT | Mod: RT,MH

## 2022-10-27 ENCOUNTER — APPOINTMENT (OUTPATIENT)
Dept: ORTHOPEDIC SURGERY | Facility: CLINIC | Age: 77
End: 2022-10-27

## 2022-10-27 VITALS — BODY MASS INDEX: 39.9 KG/M2 | HEIGHT: 71 IN | WEIGHT: 285 LBS

## 2022-10-27 PROCEDURE — 99214 OFFICE O/P EST MOD 30 MIN: CPT | Mod: 25

## 2022-10-27 PROCEDURE — 20611 DRAIN/INJ JOINT/BURSA W/US: CPT

## 2022-10-27 NOTE — HISTORY OF PRESENT ILLNESS
[de-identified] : Patient is here for evaluation of his right knee MRI came back with possible mediolateral meniscal tears at this point he still has pain mostly in the medi\par al aspect of the knee\par \par  right knees got no effusion positive Miriam's good range of motion negative erythema negative Homans sign\par \par  talked about a treatment plan is going to eat less today and we is a agreed upon a cortisone shot that this is done ultrasound-guided under sterile conditions he tolerated well who call me if there is any issues side effects discussed with the injection\par Procedure Name: Large Joint Injection / Aspiration: Dexamethasone, Lidocaine Ultrasound and Guidance\par Large Joint Injection was performed because of pain. Anesthesia: ethyl chloride sprayed topically.. Dexamethasone 1 cc. Needle size: 22 gauge. 1.5 inch.\par Lidocaine: 2 cc. Needle size: 22 gauge , 1.5 inch.\par \par Medication was injected in the joint. After verbal consent using sterile preparation and technique. The risks, benefits, and alternatives to cortisone injection were explained in full to the patient. Risks outlined include but are not limited to infection, sepsis, bleeding, scarring, skin discoloration, temporary increase in pain, syncopal episode, failure to resolve symptoms, allergic reaction, symptom recurrence, and elevation of blood sugar in diabetics. Patient understood the risks. All questions were answered. After discussion of options, patient requested an injection. Oral informed consent was obtained and sterile prep was done of the injection site. Sterile technique was utilized for the procedure including the preparation of the solutions used for the injection. Patient tolerated the procedure well. Advised to ice the injection site this evening. Prep with alcohol locally to site. Sterile technique used. Post Procedure Instructions: \par \par Ultrasound Guidance was used for the following reasons: To visualize the needle in the joint.\par \par visualization of the needle and placement of injection was performed without complication.\par

## 2022-11-11 ENCOUNTER — APPOINTMENT (OUTPATIENT)
Dept: ORTHOPEDIC SURGERY | Facility: CLINIC | Age: 77
End: 2022-11-11

## 2022-12-05 NOTE — ED PROVIDER NOTE - NSDCPRINTRESULTS_ED_ALL_ED
Patient is having left total hip surgery with Dr. Arredondo on 12/8/2022.    RN calling from [facility] ensuring that Dr. Brown received EKG that was sent to [facility].    Upon review:  EKG is in [facility]. Dr. Brown is attached. If Dr. Brown could please read and sign-off on EKG before the end of the day today?      
Patient requests all Lab and Radiology Results on their Discharge Instructions

## 2022-12-22 NOTE — ASU PATIENT PROFILE, ADULT - PAIN CHRONIC, PROFILE
[FreeTextEntry1] : Niki Goff is a 56 year old female PMH HLD presents today for consult for screening colonoscopy. Reports last procedure was about 7 years ago, had colon polyps was told to repeat in five. Pt denies FMH of CRC, denies GI complaints. Typically has bowel movements daily without significant straining or overt bleeding such as melena or hematochezia. Denies upper GI complaints such as nausea, vomiting, dysphagia, odynophagia. 
yes

## 2023-01-31 ENCOUNTER — APPOINTMENT (OUTPATIENT)
Dept: CARDIOLOGY | Facility: CLINIC | Age: 78
End: 2023-01-31
Payer: MEDICARE

## 2023-01-31 VITALS
DIASTOLIC BLOOD PRESSURE: 86 MMHG | TEMPERATURE: 97.9 F | HEART RATE: 66 BPM | HEIGHT: 71 IN | WEIGHT: 280 LBS | BODY MASS INDEX: 39.2 KG/M2 | OXYGEN SATURATION: 95 % | SYSTOLIC BLOOD PRESSURE: 150 MMHG

## 2023-01-31 PROCEDURE — 93000 ELECTROCARDIOGRAM COMPLETE: CPT

## 2023-01-31 PROCEDURE — 99214 OFFICE O/P EST MOD 30 MIN: CPT

## 2023-01-31 RX ORDER — DICYCLOMINE HYDROCHLORIDE 20 MG/1
20 TABLET ORAL 3 TIMES DAILY
Refills: 0 | Status: DISCONTINUED | COMMUNITY
End: 2023-01-31

## 2023-01-31 NOTE — HISTORY OF PRESENT ILLNESS
[FreeTextEntry1] : Ms. Mcnally is a 77-year-old man with history of CAD (PCI RCA 2019, LAD 2001; LCx 100%), paroxysmal AF on DOAC, HTN, HLD, hypothyroidism, and ETELVINA presenting for follow up.\par \par Patient previously followed with Dr. Rizzo and was last seen in office 9/2022.\par Today overall feels well, denies recent cardiopulmonary complaints. Has chronic back and lower extremity joint discomfort.\par \par TTE 9/2022\par - Normal systolic function, posterobasal hypokinesis, severe LA dilatation, mild MR, mild AI, mild Ao sclerosis, in NSR\par \par LHC 12/2019\par - Significant double vessel CAD, RCA and LCx, s/p PCI of pRCA.\par - 60% mLAD\par \par NM Stress 2019\par - Moderate-to-large in size, severe in severity inferolateral defect with minimal reversibility\par \par Labs\par - , , HDL 40, VLDL 27, LDL 77\par - Cr 0.88, K 4.9\par \par Meds:\par - Xarelto 20mg daily\par - Amlodipine 2.5mg\par - Valsartan 160mg\par - Metoprolol 50mg BID\par - Rosuvastatin 40mg\par - Fenofibrate 145mg daily\par - Furosemide 40mg daily\par - Synthroid 150mcg

## 2023-01-31 NOTE — ASSESSMENT
[FreeTextEntry1] : Ms. Mcnally is a 77-year-old man with history of CAD (PCI RCA 2019, LAD 2001; LCx 100%), paroxysmal AF on DOAC, HTN, HLD, hypothyroidism, and ETELVINA presenting for follow up.\par \par Impression:\par (1) CAD s/p PCI as above, stable\par (2) Paroxysmal AF, CHADSVASc at least 4 (Age +2, HTN, CAD), on Rivaroxaban\par (3) HTN, suboptimal\par (4) HLD, goal LDL <70, currently 77\par (5) ETELVINA\par \par Plan:\par - He is currently on aspirin and Rivaroxaban, discussed discontinuation of aspirin based on the AFIRE trial. Will continue Rivaroxaban as monotherpy.\par - Follow home BP; goal 120/80; if unable to reach this target would switch valsartan to valsartan-HCTZ 160-12.5mg and increase amlodipine to 5mg.\par - Repeat TTE for evaluation of MR and AI\par - Check carotid doppler given history of CAD (none on file)\par - Repeat cardiometabolic labs; if LDL >70 add on Zetia.\par \par RTC 6 months, sooner as needed

## 2023-02-02 ENCOUNTER — APPOINTMENT (OUTPATIENT)
Dept: CARDIOLOGY | Facility: CLINIC | Age: 78
End: 2023-02-02

## 2023-03-04 ENCOUNTER — NON-APPOINTMENT (OUTPATIENT)
Age: 78
End: 2023-03-04

## 2023-06-02 ENCOUNTER — RX RENEWAL (OUTPATIENT)
Age: 78
End: 2023-06-02

## 2023-06-13 ENCOUNTER — APPOINTMENT (OUTPATIENT)
Dept: PULMONOLOGY | Facility: CLINIC | Age: 78
End: 2023-06-13
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
## 2023-06-30 ENCOUNTER — APPOINTMENT (OUTPATIENT)
Dept: ELECTROPHYSIOLOGY | Facility: CLINIC | Age: 78
End: 2023-06-30
Payer: MEDICARE

## 2023-06-30 PROCEDURE — 99214 OFFICE O/P EST MOD 30 MIN: CPT

## 2023-06-30 PROCEDURE — 93000 ELECTROCARDIOGRAM COMPLETE: CPT

## 2023-07-05 ENCOUNTER — APPOINTMENT (OUTPATIENT)
Dept: ORTHOPEDIC SURGERY | Facility: CLINIC | Age: 78
End: 2023-07-05

## 2023-07-17 ENCOUNTER — APPOINTMENT (OUTPATIENT)
Dept: ORTHOPEDIC SURGERY | Facility: CLINIC | Age: 78
End: 2023-07-17

## 2023-08-01 NOTE — PHYSICAL EXAM
[Well Developed] : well developed [Well Nourished] : well nourished [No Acute Distress] : no acute distress [Normal Venous Pressure] : normal venous pressure [No Carotid Bruit] : no carotid bruit [Normal S1, S2] : normal S1, S2 [No Murmur] : no murmur [No Rub] : no rub [No Gallop] : no gallop [Clear Lung Fields] : clear lung fields [Good Air Entry] : good air entry [No Respiratory Distress] : no respiratory distress  [Soft] : abdomen soft [Non Tender] : non-tender [No Masses/organomegaly] : no masses/organomegaly [Normal Bowel Sounds] : normal bowel sounds [Normal Gait] : normal gait [No Edema] : no edema [No Cyanosis] : no cyanosis [No Clubbing] : no clubbing [No Varicosities] : no varicosities [No Rash] : no rash [No Skin Lesions] : no skin lesions [Moves all extremities] : moves all extremities [No Focal Deficits] : no focal deficits [Normal Speech] : normal speech [Alert and Oriented] : alert and oriented [Normal memory] : normal memory [General Appearance - Well Developed] : well developed [Normal Appearance] : normal appearance [Well Groomed] : well groomed [General Appearance - Well Nourished] : well nourished [No Deformities] : no deformities [General Appearance - In No Acute Distress] : no acute distress [Normal Conjunctiva] : the conjunctiva exhibited no abnormalities [Eyelids - No Xanthelasma] : the eyelids demonstrated no xanthelasmas [No Oral Pallor] : no oral pallor [No Oral Cyanosis] : no oral cyanosis [Respiration, Rhythm And Depth] : normal respiratory rhythm and effort [Heart Rate And Rhythm] : heart rate and rhythm were normal [Heart Sounds] : normal S1 and S2 [Arterial Pulses Normal] : the arterial pulses were normal [Abdomen Soft] : soft [Abnormal Walk] : normal gait [Nail Clubbing] : no clubbing of the fingernails [Cyanosis, Localized] : no localized cyanosis [Petechial Hemorrhages (___cm)] : no petechial hemorrhages [Skin Color & Pigmentation] : normal skin color and pigmentation [] : no rash [Oriented To Time, Place, And Person] : oriented to person, place, and time [Affect] : the affect was normal [Mood] : the mood was normal [No Anxiety] : not feeling anxious [FreeTextEntry1] : No JVD

## 2023-08-01 NOTE — ADDENDUM
[FreeTextEntry1] : Fawn LAZO assisted in documentation on 06/30/2023   acting as a scribe for Dr. Jero Hall.\par

## 2023-08-01 NOTE — HISTORY OF PRESENT ILLNESS
[FreeTextEntry1] : Patient with history DVT, ETELVINA t HTN, DM, PCI, CAD, HLD seizures and kidney stone. PAF  CHADVASC 4.  Patient had h/o syncope ,Work up was negative h/o DCCV  guided PHILIP in 2020\par \par Presents for routine follow up\par Denies any sob, STRINGER, PND, orthopnea, no palpitations, no dizziness,lightheadedness, denies chest pains\par \par Patient doing well. No palpitations, shows no signs of dyspnea on exertion. \par \par Patient comes for routine follow-up. Patient feels great. He has lifted his head off his bed and his wife found that there is less snoring and he is sleeping better.\par \par \par EKG (06/30/2023): Sinus rhythm at 75 bpm.\par ECHO ( 4/13/2021) EF 50%\par Cardiologist: Dr. Rizzo\par \par

## 2023-08-01 NOTE — ASSESSMENT
[FreeTextEntry1] : AF\par - Patient in normal sinus rhythm and doing well\par - Continue Xarelto 20mg QD\par - Continue metoprolol 50mg BID\par \par Hypertension\par - Patient's blood pressure normally well controlled however he did not take his medication this morning\par - Patient is following up with cardiology and will recheck his blood pressure then\par - Continue Valsartan 100mg QD\par - Continue amlodipine 2.5 QD\par \par \par \par

## 2023-08-05 ENCOUNTER — NON-APPOINTMENT (OUTPATIENT)
Age: 78
End: 2023-08-05

## 2023-08-06 ENCOUNTER — INPATIENT (INPATIENT)
Facility: HOSPITAL | Age: 78
LOS: 3 days | Discharge: HOME | DRG: 291 | End: 2023-08-10
Attending: STUDENT IN AN ORGANIZED HEALTH CARE EDUCATION/TRAINING PROGRAM | Admitting: STUDENT IN AN ORGANIZED HEALTH CARE EDUCATION/TRAINING PROGRAM
Payer: MEDICARE

## 2023-08-06 VITALS
RESPIRATION RATE: 18 BRPM | OXYGEN SATURATION: 95 % | DIASTOLIC BLOOD PRESSURE: 85 MMHG | HEART RATE: 88 BPM | SYSTOLIC BLOOD PRESSURE: 144 MMHG | TEMPERATURE: 99 F

## 2023-08-06 DIAGNOSIS — Z98.890 OTHER SPECIFIED POSTPROCEDURAL STATES: Chronic | ICD-10-CM

## 2023-08-06 DIAGNOSIS — Z98.49 CATARACT EXTRACTION STATUS, UNSPECIFIED EYE: Chronic | ICD-10-CM

## 2023-08-06 DIAGNOSIS — R07.9 CHEST PAIN, UNSPECIFIED: ICD-10-CM

## 2023-08-06 DIAGNOSIS — I25.10 ATHEROSCLEROTIC HEART DISEASE OF NATIVE CORONARY ARTERY WITHOUT ANGINA PECTORIS: Chronic | ICD-10-CM

## 2023-08-06 DIAGNOSIS — N20.0 CALCULUS OF KIDNEY: Chronic | ICD-10-CM

## 2023-08-06 LAB
ALBUMIN SERPL ELPH-MCNC: 4.3 G/DL — SIGNIFICANT CHANGE UP (ref 3.5–5.2)
ALP SERPL-CCNC: 52 U/L — SIGNIFICANT CHANGE UP (ref 30–115)
ALT FLD-CCNC: 25 U/L — SIGNIFICANT CHANGE UP (ref 0–41)
ANION GAP SERPL CALC-SCNC: 13 MMOL/L — SIGNIFICANT CHANGE UP (ref 7–14)
AST SERPL-CCNC: 19 U/L — SIGNIFICANT CHANGE UP (ref 0–41)
BASOPHILS # BLD AUTO: 0.03 K/UL — SIGNIFICANT CHANGE UP (ref 0–0.2)
BASOPHILS NFR BLD AUTO: 0.3 % — SIGNIFICANT CHANGE UP (ref 0–1)
BILIRUB SERPL-MCNC: 0.7 MG/DL — SIGNIFICANT CHANGE UP (ref 0.2–1.2)
BUN SERPL-MCNC: 14 MG/DL — SIGNIFICANT CHANGE UP (ref 10–20)
CALCIUM SERPL-MCNC: 9.6 MG/DL — SIGNIFICANT CHANGE UP (ref 8.4–10.5)
CHLORIDE SERPL-SCNC: 101 MMOL/L — SIGNIFICANT CHANGE UP (ref 98–110)
CO2 SERPL-SCNC: 25 MMOL/L — SIGNIFICANT CHANGE UP (ref 17–32)
CREAT SERPL-MCNC: 1 MG/DL — SIGNIFICANT CHANGE UP (ref 0.7–1.5)
EGFR: 78 ML/MIN/1.73M2 — SIGNIFICANT CHANGE UP
EOSINOPHIL # BLD AUTO: 0.05 K/UL — SIGNIFICANT CHANGE UP (ref 0–0.7)
EOSINOPHIL NFR BLD AUTO: 0.5 % — SIGNIFICANT CHANGE UP (ref 0–8)
GLUCOSE BLDC GLUCOMTR-MCNC: 119 MG/DL — HIGH (ref 70–99)
GLUCOSE SERPL-MCNC: 147 MG/DL — HIGH (ref 70–99)
HCT VFR BLD CALC: 38.7 % — LOW (ref 42–52)
HGB BLD-MCNC: 12.4 G/DL — LOW (ref 14–18)
IMM GRANULOCYTES NFR BLD AUTO: 0.4 % — HIGH (ref 0.1–0.3)
LYMPHOCYTES # BLD AUTO: 1.45 K/UL — SIGNIFICANT CHANGE UP (ref 1.2–3.4)
LYMPHOCYTES # BLD AUTO: 15.9 % — LOW (ref 20.5–51.1)
MCHC RBC-ENTMCNC: 28.4 PG — SIGNIFICANT CHANGE UP (ref 27–31)
MCHC RBC-ENTMCNC: 32 G/DL — SIGNIFICANT CHANGE UP (ref 32–37)
MCV RBC AUTO: 88.6 FL — SIGNIFICANT CHANGE UP (ref 80–94)
MONOCYTES # BLD AUTO: 0.86 K/UL — HIGH (ref 0.1–0.6)
MONOCYTES NFR BLD AUTO: 9.4 % — HIGH (ref 1.7–9.3)
NEUTROPHILS # BLD AUTO: 6.7 K/UL — HIGH (ref 1.4–6.5)
NEUTROPHILS NFR BLD AUTO: 73.5 % — SIGNIFICANT CHANGE UP (ref 42.2–75.2)
NRBC # BLD: 0 /100 WBCS — SIGNIFICANT CHANGE UP (ref 0–0)
NT-PROBNP SERPL-SCNC: 1655 PG/ML — HIGH (ref 0–300)
PLATELET # BLD AUTO: 151 K/UL — SIGNIFICANT CHANGE UP (ref 130–400)
PMV BLD: 9.9 FL — SIGNIFICANT CHANGE UP (ref 7.4–10.4)
POTASSIUM SERPL-MCNC: 4.5 MMOL/L — SIGNIFICANT CHANGE UP (ref 3.5–5)
POTASSIUM SERPL-SCNC: 4.5 MMOL/L — SIGNIFICANT CHANGE UP (ref 3.5–5)
PROT SERPL-MCNC: 6.6 G/DL — SIGNIFICANT CHANGE UP (ref 6–8)
RBC # BLD: 4.37 M/UL — LOW (ref 4.7–6.1)
RBC # FLD: 14.5 % — SIGNIFICANT CHANGE UP (ref 11.5–14.5)
SODIUM SERPL-SCNC: 139 MMOL/L — SIGNIFICANT CHANGE UP (ref 135–146)
TROPONIN T SERPL-MCNC: 0.02 NG/ML — HIGH
WBC # BLD: 9.13 K/UL — SIGNIFICANT CHANGE UP (ref 4.8–10.8)
WBC # FLD AUTO: 9.13 K/UL — SIGNIFICANT CHANGE UP (ref 4.8–10.8)

## 2023-08-06 PROCEDURE — 83735 ASSAY OF MAGNESIUM: CPT

## 2023-08-06 PROCEDURE — 86803 HEPATITIS C AB TEST: CPT

## 2023-08-06 PROCEDURE — 93312 ECHO TRANSESOPHAGEAL: CPT

## 2023-08-06 PROCEDURE — 93320 DOPPLER ECHO COMPLETE: CPT

## 2023-08-06 PROCEDURE — 80048 BASIC METABOLIC PNL TOTAL CA: CPT

## 2023-08-06 PROCEDURE — 93306 TTE W/DOPPLER COMPLETE: CPT

## 2023-08-06 PROCEDURE — 93970 EXTREMITY STUDY: CPT

## 2023-08-06 PROCEDURE — 80053 COMPREHEN METABOLIC PANEL: CPT

## 2023-08-06 PROCEDURE — 82962 GLUCOSE BLOOD TEST: CPT

## 2023-08-06 PROCEDURE — 83036 HEMOGLOBIN GLYCOSYLATED A1C: CPT

## 2023-08-06 PROCEDURE — 99285 EMERGENCY DEPT VISIT HI MDM: CPT | Mod: FS

## 2023-08-06 PROCEDURE — 93325 DOPPLER ECHO COLOR FLOW MAPG: CPT

## 2023-08-06 PROCEDURE — 84443 ASSAY THYROID STIM HORMONE: CPT

## 2023-08-06 PROCEDURE — 99223 1ST HOSP IP/OBS HIGH 75: CPT

## 2023-08-06 PROCEDURE — 71275 CT ANGIOGRAPHY CHEST: CPT | Mod: 26,MA

## 2023-08-06 PROCEDURE — 71045 X-RAY EXAM CHEST 1 VIEW: CPT | Mod: 26

## 2023-08-06 PROCEDURE — 84484 ASSAY OF TROPONIN QUANT: CPT

## 2023-08-06 PROCEDURE — 93005 ELECTROCARDIOGRAM TRACING: CPT

## 2023-08-06 PROCEDURE — 85025 COMPLETE CBC W/AUTO DIFF WBC: CPT

## 2023-08-06 PROCEDURE — 36415 COLL VENOUS BLD VENIPUNCTURE: CPT

## 2023-08-06 RX ORDER — ATORVASTATIN CALCIUM 80 MG/1
80 TABLET, FILM COATED ORAL AT BEDTIME
Refills: 0 | Status: DISCONTINUED | OUTPATIENT
Start: 2023-08-06 | End: 2023-08-10

## 2023-08-06 RX ORDER — RIVAROXABAN 15 MG-20MG
20 KIT ORAL
Refills: 0 | Status: DISCONTINUED | OUTPATIENT
Start: 2023-08-06 | End: 2023-08-06

## 2023-08-06 RX ORDER — TAMSULOSIN HYDROCHLORIDE 0.4 MG/1
0.4 CAPSULE ORAL AT BEDTIME
Refills: 0 | Status: DISCONTINUED | OUTPATIENT
Start: 2023-08-06 | End: 2023-08-10

## 2023-08-06 RX ORDER — PANTOPRAZOLE SODIUM 20 MG/1
40 TABLET, DELAYED RELEASE ORAL
Refills: 0 | Status: DISCONTINUED | OUTPATIENT
Start: 2023-08-06 | End: 2023-08-10

## 2023-08-06 RX ORDER — LANOLIN ALCOHOL/MO/W.PET/CERES
3 CREAM (GRAM) TOPICAL AT BEDTIME
Refills: 0 | Status: DISCONTINUED | OUTPATIENT
Start: 2023-08-06 | End: 2023-08-10

## 2023-08-06 RX ORDER — CLOPIDOGREL BISULFATE 75 MG/1
1 TABLET, FILM COATED ORAL
Qty: 0 | Refills: 0 | DISCHARGE

## 2023-08-06 RX ORDER — FAMOTIDINE 10 MG/ML
40 INJECTION INTRAVENOUS DAILY
Refills: 0 | Status: DISCONTINUED | OUTPATIENT
Start: 2023-08-06 | End: 2023-08-10

## 2023-08-06 RX ORDER — LEVOTHYROXINE SODIUM 125 MCG
150 TABLET ORAL DAILY
Refills: 0 | Status: DISCONTINUED | OUTPATIENT
Start: 2023-08-06 | End: 2023-08-10

## 2023-08-06 RX ORDER — FINASTERIDE 5 MG/1
5 TABLET, FILM COATED ORAL DAILY
Refills: 0 | Status: DISCONTINUED | OUTPATIENT
Start: 2023-08-06 | End: 2023-08-10

## 2023-08-06 RX ORDER — METOPROLOL TARTRATE 50 MG
50 TABLET ORAL
Refills: 0 | Status: DISCONTINUED | OUTPATIENT
Start: 2023-08-06 | End: 2023-08-10

## 2023-08-06 RX ORDER — VALSARTAN 80 MG/1
80 TABLET ORAL DAILY
Refills: 0 | Status: DISCONTINUED | OUTPATIENT
Start: 2023-08-06 | End: 2023-08-10

## 2023-08-06 RX ORDER — ASPIRIN/CALCIUM CARB/MAGNESIUM 324 MG
324 TABLET ORAL ONCE
Refills: 0 | Status: COMPLETED | OUTPATIENT
Start: 2023-08-06 | End: 2023-08-06

## 2023-08-06 RX ORDER — FENOFIBRATE,MICRONIZED 130 MG
145 CAPSULE ORAL DAILY
Refills: 0 | Status: DISCONTINUED | OUTPATIENT
Start: 2023-08-06 | End: 2023-08-10

## 2023-08-06 RX ORDER — TRAMADOL HYDROCHLORIDE 50 MG/1
0 TABLET ORAL
Qty: 0 | Refills: 0 | DISCHARGE

## 2023-08-06 RX ORDER — FUROSEMIDE 40 MG
40 TABLET ORAL ONCE
Refills: 0 | Status: COMPLETED | OUTPATIENT
Start: 2023-08-06 | End: 2023-08-06

## 2023-08-06 RX ADMIN — Medication 40 MILLIGRAM(S): at 21:08

## 2023-08-06 NOTE — ED PROVIDER NOTE - ATTENDING APP SHARED VISIT CONTRIBUTION OF CARE
77yM HTN DLD CAD s/p stents DM p/w chest pain, palpitations and leg swelling x days.  Pt has been on HRT reportedly for weight loss and has noticed recent palpitations, b/l ankle swelling and now chest pain today.  EKG at urgent care showed afib (pt w/ hx afib s/p ablation and saw Dr. Hall of EP recently w/ reassuring evaluation) and pt was sent to the ED via EMS.

## 2023-08-06 NOTE — ED PROVIDER NOTE - CLINICAL SUMMARY MEDICAL DECISION MAKING FREE TEXT BOX
77yM on HRT (for weight loss) p/w palpitations, CP and b/l LE edema.  Pt in afib (intermittent mild tachycardia) w/o ischemic changes.  Labs notable for mildly elevated troponin 0.02 and moderately elevated BNP suggestive of volume overload.  CXR w/o ptx or pna.  CTA chest obtained given hormonal therapy shows no PE.  Will adm pt to tele for further care. 77yM on HRT (for weight loss) p/w palpitations, CP and b/l LE edema.  Pt in afib (intermittent mild tachycardia) w/o ischemic changes.  Labs notable for mildly elevated troponin 0.02 and moderately elevated BNP suggestive of volume overload.  CXR w/o ptx or pna.  CTA chest obtained given hormonal therapy shows no PE.  Will load with aspirin, start lasix and adm pt to tele for further care.

## 2023-08-06 NOTE — H&P ADULT - NSHPLABSRESULTS_GEN_ALL_CORE
.  LABS:                         12.4   9.13  )-----------( 151      ( 06 Aug 2023 13:30 )             38.7     08-06    139  |  101  |  14  ----------------------------<  147<H>  4.5   |  25  |  1.0    Ca    9.6      06 Aug 2023 13:30    TPro  6.6  /  Alb  4.3  /  TBili  0.7  /  DBili  x   /  AST  19  /  ALT  25  /  AlkPhos  52  08-06      Urinalysis Basic - ( 06 Aug 2023 13:30 )    Color: x / Appearance: x / SG: x / pH: x  Gluc: 147 mg/dL / Ketone: x  / Bili: x / Urobili: x   Blood: x / Protein: x / Nitrite: x   Leuk Esterase: x / RBC: x / WBC x   Sq Epi: x / Non Sq Epi: x / Bacteria: x            RADIOLOGY, EKG & ADDITIONAL TESTS: Reviewed.

## 2023-08-06 NOTE — ED ADULT NURSE NOTE - NSFALLHARMRISKINTERV_ED_ALL_ED

## 2023-08-06 NOTE — H&P ADULT - ATTENDING COMMENTS
77-year-old male with a pmhx of HTN, HLD, CAD s/p 5 PCIs, DM, Afib s/p DCCV on xarelto presented to the ED from urgent care complaining of chest pain and dyspnea on exertion. Associated with one week of BL LE edema, sweating, and inability to sleep. Patient recently started on HRT 4 weeks ago.       #Chest pressure- resolved  #Afib with RVR s/p DCCV- now HR controlled  #hx of CAD s/p 5 PCIs  #sweating/insomnia   - Loaded with asa 324 in urgent care. s/p 1 IV lasix 40   - trops 0.02, BNP 1655  - CTA: which did not show a PE but showed BL GGO and septal thickening compatible with pulmonary edema. Dilated thoracic aortameasuring 4.4 cm, unchanged. Main pulmonary artery measures 3.6 cm, unchanged, which may be suggestive of pulmonary hypertension.  - EKG showed only showed T-wave inversion in lead III. Afib   - Last cath was in 2019 (dr Borrego) showed significant 2 vessel disease in the proximal LCx (100% occluded) and proximal RCA (90% occluded). had PCI to proximal RCA   - Had DCCV in 2020 succesfully to NSR   - No recent echo but on PHILIP EF was NL   - sweating and insomnia mostly 2/2 to HRT     - FU TTE   - cw IV lasix 40 BID (patient on home 40 qd)   - cw with ASA, metoprolol  - GIven the patient is high risk for CAD. Consult cardio and switch xarelto to lovenox    - trops mostly 2/2 demand ischemia. trend trops  - Monitor BMP BID. Keep magn >2 and K>4     #HTN   #HLD   - cw home meds     #DM   - monitor FS  - start insulin basal-bolus if >180 persistently     #BPH   cw home meds    MIsc;  - DVT ppx: lovenox   - GI ppx: protonix and pepcid   - activity; AAT  - Diet; Dash/CC- fluid restriction

## 2023-08-06 NOTE — ED ADULT NURSE NOTE - CHIEF COMPLAINT QUOTE
Pt here with chest pain / sob x 1 week and [place] [facility] 4 baby [place] [facility]. Pt reports mild new leg swelling. EKG done.

## 2023-08-06 NOTE — ED PROVIDER NOTE - OBJECTIVE STATEMENT
[age] yo male with a pmh of htn, hld, cad, dm, afib on eliquis presents c/o chest pain/SOB/chills/cough for 1 week. pt states to have midsternal non-radiating chest pain and SOB with exertion. pt mentions to have been on HRT over the past 4 weeks. pt denies any other symptoms including headache, recent illness/travel, abdominal pain.

## 2023-08-06 NOTE — ED ADULT TRIAGE NOTE - CHIEF COMPLAINT QUOTE
Pt here with chest pain / sob x 1 week and went to [facility] [place]. pt took 4 baby aspirin at [facility]. Pt reports mild new leg swelling. EKG done.

## 2023-08-06 NOTE — H&P ADULT - ASSESSMENT
77-year-old male with a pmhx of HTN, HLD, CAD s/p 5 PCIs, DM, Afib s/p DCCV on xarelto presented to the ED from urgent care complaining of chest pain and dyspnea on exertion. Associated with one week of BL LE edema, sweating, and inability to sleep. Patient recently started on HRT 4 weeks ago.       #Chest pressure- resolved  #Afib with RVR s/p DCCV- now HR controlled  #hx of CAD s/p 5 PCIs  #sweating/insomnia   - Loaded with asa 324 in urgent care. s/p 1 IV lasix 40   - trops 0.02, BNP 1655  - CTA: which did not show a PE but showed BL GGO and septal thickening compatible with pulmonary edema. Dilated thoracic aortameasuring 4.4 cm, unchanged. Main pulmonary artery measures 3.6 cm, unchanged, which may be suggestive of pulmonary hypertension.  - EKG showed only showed T-wave inversion in lead III. Afib   - Last cath was in 2019 (dr Borrego) showed significant 2 vessel disease in the proximal LCx (100% occluded) and proximal RCA (90% occluded). had PCI to proximal RCA   - Had DCCV in 2020 succesfully to NSR   - No recent echo but on PHILIP EF was NL   - sweating and insomnia mostly 2/2 to HRT     - FU TTE   - cw IV lasix 40 BID (patient on home 40 qd)   - cw with ASA, metoprolol, and xarelto   - trops mostly 2/2 demand ischemia. trend trops If uptrending> cardio consult   - Monitor BMP BID. Keep magn >2 and K>4     #HTN   #HLD   - cw home meds     #DM   - monitor FS  - start insulin basal-bolus if >180 persistently     #BPH   cw home meds    MIsc;  - DVT ppx: xarelto  - GI ppx: protonix and pepcid   - activity; AAT  - Diet; Dash/CC- fluid restriction  77-year-old male with a pmhx of HTN, HLD, CAD s/p 5 PCIs, DM, Afib s/p DCCV on xarelto presented to the ED from urgent care complaining of chest pain and dyspnea on exertion. Associated with one week of BL LE edema, sweating, and inability to sleep. Patient recently started on HRT 4 weeks ago.       #Chest pressure- resolved  #Afib with RVR s/p DCCV- now HR controlled  #hx of CAD s/p 5 PCIs  #sweating/insomnia   - Loaded with asa 324 in urgent care. s/p 1 IV lasix 40   - trops 0.02, BNP 1655  - CTA: which did not show a PE but showed BL GGO and septal thickening compatible with pulmonary edema. Dilated thoracic aortameasuring 4.4 cm, unchanged. Main pulmonary artery measures 3.6 cm, unchanged, which may be suggestive of pulmonary hypertension.  - EKG showed only showed T-wave inversion in lead III. Afib   - Last cath was in 2019 (dr Borrego) showed significant 2 vessel disease in the proximal LCx (100% occluded) and proximal RCA (90% occluded). had PCI to proximal RCA   - Had DCCV in 2020 succesfully to NSR   - No recent echo but on PHILIP EF was NL   - sweating and insomnia mostly 2/2 to HRT     - FU TTE   - cw IV lasix 40 BID (patient on home 40 qd)   - cw with ASA, metoprolol  - GIven the patient is high risk for CAD. Consult cardio and switch xarelto to lovenox    - trops mostly 2/2 demand ischemia. trend trops  - Monitor BMP BID. Keep magn >2 and K>4     #HTN   #HLD   - cw home meds     #DM   - monitor FS  - start insulin basal-bolus if >180 persistently     #BPH   cw home meds    MIsc;  - DVT ppx: lovenox   - GI ppx: protonix and pepcid   - activity; AAT  - Diet; Dash/CC- fluid restriction

## 2023-08-06 NOTE — H&P ADULT - HISTORY OF PRESENT ILLNESS
77-year-old male with a pmhx of HTN, HLD, CAD s/p 5 PCIs, DM, Afib s/p DCCV on xarelto presented to the ED from urgent care complaining of chest pain and dyspnea on exertion. Patient said he has been sweating profusely since one week, and unable to sleep with increasing LE edema BL. Today patient started having chest pressure in the middle of the chest, nonradiating, patient describes its different from previous heart attacks, and felt tapia when going upstairs. He went to  were they loaded with asprin and chest pain resolved, he was found to be in Afib and was sent to the ED. Denies nausea, vomiting, or other complaints.  pt mentions to have been on HRT over the past 4 weeks.    In the ED trops were 0.02 (baseline last year was<0.01), BNP 1655, they did a CTA which did not show a PE but showed BL GGO and septal thickening compatible with pulmonary edema. EKG showed only showed T-wave inversion in lead III. He was given lasix 40 IV and admitted to medicine     ED VS:   · Temp (F)	99.4  · Temp (C) Temp (C)	37.4  · Temp site Temp Site	oral  · Heart Rate Heart Rate (beats/min)	88  · BP Systolic Systolic	144  · BP Diastolic Diastolic (mm Hg)	85  · Respiration Rate (breaths/min) Respiration Rate (breaths/min)	18  · SpO2 (%) SpO2 (%)	95
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
## 2023-08-06 NOTE — ED PROVIDER NOTE - PHYSICAL EXAMINATION
Gen: NAD, AOx3  Head: NCAT  HEENT: PERRL, oral mucosa moist, normal conjunctiva, oropharynx clear without exudate or erythema  Lung: CTAB, no respiratory distress, no wheezing, rales, rhonchi  CV: normal s1/s2, rrr, Normal perfusion, pulses 2+ throughout  Abd: soft, NTND, no CVA tenderness  Genitourinary: no pelvic tenderness  MSK: BLE edema, no visible deformities, full range of motion in all 4 extremities  Neuro: No focal neurologic deficits  Skin: No rash   Psych: normal affect

## 2023-08-06 NOTE — H&P ADULT - NSHPPHYSICALEXAM_GEN_ALL_CORE
VITALS:   T(C): 37.2 (08-06-23 @ 15:53), Max: 37.4 (08-06-23 @ 12:06)  HR: 78 (08-06-23 @ 15:53) (78 - 88)  BP: 150/74 (08-06-23 @ 15:53) (144/85 - 150/74)  RR: 18 (08-06-23 @ 15:53) (18 - 18)  SpO2: 95% (08-06-23 @ 15:53) (93% - 95%)    GENERAL: NAD, lying in bed comfortably  HEAD:  Atraumatic, normocephalic  EYES: EOMI, PERRLA, conjunctiva and sclera clear  ENT: Moist mucous membranes  NECK: Supple, no JVD  HEART: Regular rate and rhythm, no murmurs, rubs, or gallops  LUNGS: Unlabored respirations.  decreased breathing sounds BL   ABDOMEN: Soft, nontender, nondistended, +BS  EXTREMITIES: 2+ peripheral pulses bilaterally. BL edema +2 LE   NERVOUS SYSTEM:  A&Ox3, no focal deficits   SKIN: No rashes or lesions

## 2023-08-07 ENCOUNTER — TRANSCRIPTION ENCOUNTER (OUTPATIENT)
Age: 78
End: 2023-08-07

## 2023-08-07 LAB
ALBUMIN SERPL ELPH-MCNC: 4.5 G/DL — SIGNIFICANT CHANGE UP (ref 3.5–5.2)
ALP SERPL-CCNC: 53 U/L — SIGNIFICANT CHANGE UP (ref 30–115)
ALT FLD-CCNC: 22 U/L — SIGNIFICANT CHANGE UP (ref 0–41)
ANION GAP SERPL CALC-SCNC: 13 MMOL/L — SIGNIFICANT CHANGE UP (ref 7–14)
AST SERPL-CCNC: 20 U/L — SIGNIFICANT CHANGE UP (ref 0–41)
BASOPHILS # BLD AUTO: 0.01 K/UL — SIGNIFICANT CHANGE UP (ref 0–0.2)
BASOPHILS NFR BLD AUTO: 0.1 % — SIGNIFICANT CHANGE UP (ref 0–1)
BILIRUB SERPL-MCNC: 0.8 MG/DL — SIGNIFICANT CHANGE UP (ref 0.2–1.2)
BUN SERPL-MCNC: 14 MG/DL — SIGNIFICANT CHANGE UP (ref 10–20)
CALCIUM SERPL-MCNC: 10 MG/DL — SIGNIFICANT CHANGE UP (ref 8.4–10.4)
CHLORIDE SERPL-SCNC: 101 MMOL/L — SIGNIFICANT CHANGE UP (ref 98–110)
CO2 SERPL-SCNC: 26 MMOL/L — SIGNIFICANT CHANGE UP (ref 17–32)
CREAT SERPL-MCNC: 0.9 MG/DL — SIGNIFICANT CHANGE UP (ref 0.7–1.5)
EGFR: 89 ML/MIN/1.73M2 — SIGNIFICANT CHANGE UP
EOSINOPHIL # BLD AUTO: 0.06 K/UL — SIGNIFICANT CHANGE UP (ref 0–0.7)
EOSINOPHIL NFR BLD AUTO: 0.8 % — SIGNIFICANT CHANGE UP (ref 0–8)
GLUCOSE BLDC GLUCOMTR-MCNC: 159 MG/DL — HIGH (ref 70–99)
GLUCOSE BLDC GLUCOMTR-MCNC: 160 MG/DL — HIGH (ref 70–99)
GLUCOSE BLDC GLUCOMTR-MCNC: 188 MG/DL — HIGH (ref 70–99)
GLUCOSE BLDC GLUCOMTR-MCNC: 192 MG/DL — HIGH (ref 70–99)
GLUCOSE SERPL-MCNC: 143 MG/DL — HIGH (ref 70–99)
HCT VFR BLD CALC: 37.6 % — LOW (ref 42–52)
HCV AB S/CO SERPL IA: 0.04 COI — SIGNIFICANT CHANGE UP
HCV AB SERPL-IMP: SIGNIFICANT CHANGE UP
HGB BLD-MCNC: 12.2 G/DL — LOW (ref 14–18)
IMM GRANULOCYTES NFR BLD AUTO: 0.4 % — HIGH (ref 0.1–0.3)
LYMPHOCYTES # BLD AUTO: 1.43 K/UL — SIGNIFICANT CHANGE UP (ref 1.2–3.4)
LYMPHOCYTES # BLD AUTO: 19.6 % — LOW (ref 20.5–51.1)
MAGNESIUM SERPL-MCNC: 1.2 MG/DL — LOW (ref 1.8–2.4)
MCHC RBC-ENTMCNC: 28.7 PG — SIGNIFICANT CHANGE UP (ref 27–31)
MCHC RBC-ENTMCNC: 32.4 G/DL — SIGNIFICANT CHANGE UP (ref 32–37)
MCV RBC AUTO: 88.5 FL — SIGNIFICANT CHANGE UP (ref 80–94)
MONOCYTES # BLD AUTO: 0.65 K/UL — HIGH (ref 0.1–0.6)
MONOCYTES NFR BLD AUTO: 8.9 % — SIGNIFICANT CHANGE UP (ref 1.7–9.3)
NEUTROPHILS # BLD AUTO: 5.1 K/UL — SIGNIFICANT CHANGE UP (ref 1.4–6.5)
NEUTROPHILS NFR BLD AUTO: 70.2 % — SIGNIFICANT CHANGE UP (ref 42.2–75.2)
NRBC # BLD: 0 /100 WBCS — SIGNIFICANT CHANGE UP (ref 0–0)
PLATELET # BLD AUTO: 147 K/UL — SIGNIFICANT CHANGE UP (ref 130–400)
PMV BLD: 10.1 FL — SIGNIFICANT CHANGE UP (ref 7.4–10.4)
POTASSIUM SERPL-MCNC: 3.9 MMOL/L — SIGNIFICANT CHANGE UP (ref 3.5–5)
POTASSIUM SERPL-SCNC: 3.9 MMOL/L — SIGNIFICANT CHANGE UP (ref 3.5–5)
PROT SERPL-MCNC: 6.9 G/DL — SIGNIFICANT CHANGE UP (ref 6–8)
RBC # BLD: 4.25 M/UL — LOW (ref 4.7–6.1)
RBC # FLD: 14.7 % — HIGH (ref 11.5–14.5)
SODIUM SERPL-SCNC: 140 MMOL/L — SIGNIFICANT CHANGE UP (ref 135–146)
TROPONIN T SERPL-MCNC: 0.02 NG/ML — HIGH
TROPONIN T SERPL-MCNC: 0.02 NG/ML — HIGH
WBC # BLD: 7.28 K/UL — SIGNIFICANT CHANGE UP (ref 4.8–10.8)
WBC # FLD AUTO: 7.28 K/UL — SIGNIFICANT CHANGE UP (ref 4.8–10.8)

## 2023-08-07 PROCEDURE — 93306 TTE W/DOPPLER COMPLETE: CPT | Mod: 26

## 2023-08-07 RX ORDER — DEXTROSE 50 % IN WATER 50 %
12.5 SYRINGE (ML) INTRAVENOUS ONCE
Refills: 0 | Status: DISCONTINUED | OUTPATIENT
Start: 2023-08-07 | End: 2023-08-10

## 2023-08-07 RX ORDER — DEXTROSE 50 % IN WATER 50 %
15 SYRINGE (ML) INTRAVENOUS ONCE
Refills: 0 | Status: DISCONTINUED | OUTPATIENT
Start: 2023-08-07 | End: 2023-08-10

## 2023-08-07 RX ORDER — ASPIRIN/CALCIUM CARB/MAGNESIUM 324 MG
81 TABLET ORAL DAILY
Refills: 0 | Status: DISCONTINUED | OUTPATIENT
Start: 2023-08-07 | End: 2023-08-10

## 2023-08-07 RX ORDER — INSULIN LISPRO 100/ML
VIAL (ML) SUBCUTANEOUS
Refills: 0 | Status: DISCONTINUED | OUTPATIENT
Start: 2023-08-07 | End: 2023-08-10

## 2023-08-07 RX ORDER — FUROSEMIDE 40 MG
40 TABLET ORAL
Refills: 0 | Status: DISCONTINUED | OUTPATIENT
Start: 2023-08-07 | End: 2023-08-08

## 2023-08-07 RX ORDER — MAGNESIUM SULFATE 500 MG/ML
2 VIAL (ML) INJECTION
Refills: 0 | Status: COMPLETED | OUTPATIENT
Start: 2023-08-07 | End: 2023-08-07

## 2023-08-07 RX ORDER — ENOXAPARIN SODIUM 100 MG/ML
100 INJECTION SUBCUTANEOUS EVERY 12 HOURS
Refills: 0 | Status: DISCONTINUED | OUTPATIENT
Start: 2023-08-07 | End: 2023-08-07

## 2023-08-07 RX ORDER — DEXTROSE 50 % IN WATER 50 %
25 SYRINGE (ML) INTRAVENOUS ONCE
Refills: 0 | Status: DISCONTINUED | OUTPATIENT
Start: 2023-08-07 | End: 2023-08-10

## 2023-08-07 RX ORDER — ENOXAPARIN SODIUM 100 MG/ML
130 INJECTION SUBCUTANEOUS EVERY 12 HOURS
Refills: 0 | Status: DISCONTINUED | OUTPATIENT
Start: 2023-08-07 | End: 2023-08-09

## 2023-08-07 RX ORDER — GLUCAGON INJECTION, SOLUTION 0.5 MG/.1ML
1 INJECTION, SOLUTION SUBCUTANEOUS ONCE
Refills: 0 | Status: DISCONTINUED | OUTPATIENT
Start: 2023-08-07 | End: 2023-08-10

## 2023-08-07 RX ORDER — SODIUM CHLORIDE 9 MG/ML
1000 INJECTION, SOLUTION INTRAVENOUS
Refills: 0 | Status: DISCONTINUED | OUTPATIENT
Start: 2023-08-07 | End: 2023-08-10

## 2023-08-07 RX ADMIN — ATORVASTATIN CALCIUM 80 MILLIGRAM(S): 80 TABLET, FILM COATED ORAL at 21:16

## 2023-08-07 RX ADMIN — FAMOTIDINE 40 MILLIGRAM(S): 10 INJECTION INTRAVENOUS at 12:02

## 2023-08-07 RX ADMIN — Medication 20 MILLIGRAM(S): at 12:02

## 2023-08-07 RX ADMIN — ENOXAPARIN SODIUM 130 MILLIGRAM(S): 100 INJECTION SUBCUTANEOUS at 17:41

## 2023-08-07 RX ADMIN — Medication 20 MILLIGRAM(S): at 17:42

## 2023-08-07 RX ADMIN — Medication 50 MILLIGRAM(S): at 05:46

## 2023-08-07 RX ADMIN — Medication 81 MILLIGRAM(S): at 12:02

## 2023-08-07 RX ADMIN — FINASTERIDE 5 MILLIGRAM(S): 5 TABLET, FILM COATED ORAL at 12:02

## 2023-08-07 RX ADMIN — Medication 25 GRAM(S): at 19:24

## 2023-08-07 RX ADMIN — Medication 25 GRAM(S): at 21:15

## 2023-08-07 RX ADMIN — ENOXAPARIN SODIUM 130 MILLIGRAM(S): 100 INJECTION SUBCUTANEOUS at 05:45

## 2023-08-07 RX ADMIN — Medication 2: at 17:42

## 2023-08-07 RX ADMIN — VALSARTAN 80 MILLIGRAM(S): 80 TABLET ORAL at 05:45

## 2023-08-07 RX ADMIN — Medication 150 MICROGRAM(S): at 05:45

## 2023-08-07 RX ADMIN — TAMSULOSIN HYDROCHLORIDE 0.4 MILLIGRAM(S): 0.4 CAPSULE ORAL at 21:16

## 2023-08-07 RX ADMIN — Medication 40 MILLIGRAM(S): at 05:44

## 2023-08-07 RX ADMIN — PANTOPRAZOLE SODIUM 40 MILLIGRAM(S): 20 TABLET, DELAYED RELEASE ORAL at 05:46

## 2023-08-07 RX ADMIN — Medication 145 MILLIGRAM(S): at 12:02

## 2023-08-07 RX ADMIN — Medication 25 GRAM(S): at 17:44

## 2023-08-07 RX ADMIN — Medication 40 MILLIGRAM(S): at 13:28

## 2023-08-07 RX ADMIN — Medication 50 MILLIGRAM(S): at 17:42

## 2023-08-07 RX ADMIN — Medication 20 MILLIGRAM(S): at 05:46

## 2023-08-07 NOTE — PATIENT PROFILE ADULT - NSPROHMDIABETMGMTSTRAT_GEN_A_NUR
diet modification Clindamycin Pregnancy And Lactation Text: This medication can be used in pregnancy if certain situations. Clindamycin is also present in breast milk.

## 2023-08-07 NOTE — DISCHARGE NOTE NURSING/CASE MANAGEMENT/SOCIAL WORK - PATIENT PORTAL LINK FT
You can access the FollowMyHealth Patient Portal offered by [facility] by registering at the following website: [email or web address]. By joining [email or web address] FollowMyHealth portal, you will also be able to view your health information using other applications (apps) compatible with our system.

## 2023-08-07 NOTE — PROGRESS NOTE ADULT - ASSESSMENT
77-year-old male with a pmhx of HTN, HLD, CAD s/p 5 PCIs, DM, Afib s/p DCCV on xarelto presented to the ED from urgent care complaining of chest pain and dyspnea on exertion. Associated with one week of BL LE edema, sweating, and inability to sleep. Patient recently started on HRT 4 weeks ago.       #Chest pain and dyspnea, 2/2 HF exacerbation  #h/o Afib with RVR s/p DCCV  #h/o CAD s/p 5 PCIs  - Last cath was in 2019 (dr Borrego) showed significant 2 vessel disease in the proximal LCx (100% occluded) and proximal RCA (90% occluded). had PCI to proximal RCA   - Had DCCV in 2020 successfully to NSR   - Loaded with asa 324 in urgent care. s/p 1 IV lasix 40   - on admission BNP 1655, trops 0.02-->0.02  - CTA on admission: did not show a PE, showed BL GGO and septal thickening compatible with pulmonary edema. Dilated thoracic aortameasuring 4.4 cm, unchanged. Main pulmonary artery measures 3.6 cm, unchanged, which may be suggestive of pulmonary hypertension.  - EKG on admission showed T-wave inversion in lead III. Afib   - No recent echo but on PHILIP EF was NL   - patient was on xarelto, switched to lovenox on admission  - cardiology consulted, rec pending  - f/u TTE   - cw IV lasix 40 BID (patient on home 40 qd)   - Monitor BMP daily, no need for daily CBC    #HRT therapy  - sweating and insomnia mostly 2/2 to HRT     #HTN   - cw with ASA, metoprolol    #HLD   - cw home meds     #DM   - monitor FS  - start insulin basal-bolus if >180 persistently     #BPH   - cw home meds      MIsc;  - DVT ppx: lovenox   - GI ppx: protonix and pepcid   - activity; AAT  - Diet; Dash/CC- fluid restriction    77-year-old male with a pmhx of HTN, HLD, CAD s/p 5 PCIs, DM, Afib s/p DCCV on xarelto presented to the ED from urgent care complaining of chest pain and dyspnea on exertion. Associated with one week of BL LE edema, sweating, and inability to sleep. Patient recently started on HRT 4 weeks ago.       #Chest pain and dyspnea, 2/2 HF exacerbation  #h/o Afib with RVR s/p DCCV  #h/o CAD s/p 5 PCIs  - Last cath was in 2019 (dr Borrego) showed significant 2 vessel disease in the proximal LCx (100% occluded) and proximal RCA (90% occluded). had PCI to proximal RCA   - Had DCCV in 2020 successfully to NSR   - Loaded with asa 324 in urgent care. s/p 1 IV lasix 40   - on admission BNP 1655, trops 0.02-->0.02  - CTA on admission: did not show a PE, showed BL GGO and septal thickening compatible with pulmonary edema. Dilated thoracic aortameasuring 4.4 cm, unchanged. Main pulmonary artery measures 3.6 cm, unchanged, which may be suggestive of pulmonary hypertension.  - EKG on admission showed T-wave inversion in lead III. Afib   - No recent echo but on PHILIP EF was NL   - patient was on xarelto, switched to lovenox on admission  - cardiology consulted, rec pending  - f/u TTE  - f/u duplex  - start remote tele  - cw IV lasix 40 BID (patient on home 40 qd)   - Monitor BMP daily, no need for daily CBC    #HRT therapy  - sweating and insomnia mostly 2/2 to HRT     #HTN   - cw with ASA, metoprolol    #HLD   - cw home meds     #DM   - monitor FS  - start insulin basal-bolus if >180 persistently     #BPH   - cw home meds      MIsc;  - DVT ppx: lovenox   - GI ppx: protonix and pepcid   - activity; AAT  - Diet; Dash/CC- fluid restriction

## 2023-08-07 NOTE — PROGRESS NOTE ADULT - SUBJECTIVE AND OBJECTIVE BOX
Non billable encounter -    Patient seen and examined this morning  sitting inn bed  admitted for cp and CHF exacerbation  found to have low magnesium     Vital Signs Last 24 Hrs  T(C): 36.2 (07 Aug 2023 16:00), Max: 37.1 (06 Aug 2023 23:46)  T(F): 97.2 (07 Aug 2023 16:00), Max: 98.7 (06 Aug 2023 23:46)  HR: 96 (07 Aug 2023 16:00) (75 - 96)  BP: 151/85 (07 Aug 2023 16:00) (120/79 - 151/85)  BP(mean): --  RR: 18 (07 Aug 2023 16:00) (18 - 18)  SpO2: 95% (07 Aug 2023 03:09) (95% - 95%)    Parameters below as of 07 Aug 2023 03:09  Patient On (Oxygen Delivery Method): room air    MEDICATIONS  (STANDING):  aspirin  chewable 81 milliGRAM(s) Oral daily  atorvastatin 80 milliGRAM(s) Oral at bedtime  dextrose 5%. 1000 milliLiter(s) (50 mL/Hr) IV Continuous <Continuous>  dextrose 5%. 1000 milliLiter(s) (100 mL/Hr) IV Continuous <Continuous>  dextrose 50% Injectable 25 Gram(s) IV Push once  dextrose 50% Injectable 12.5 Gram(s) IV Push once  dextrose 50% Injectable 25 Gram(s) IV Push once  dicyclomine 20 milliGRAM(s) Oral three times a day before meals  enoxaparin Injectable 130 milliGRAM(s) SubCutaneous every 12 hours  famotidine    Tablet 40 milliGRAM(s) Oral daily  fenofibrate Tablet 145 milliGRAM(s) Oral daily  finasteride 5 milliGRAM(s) Oral daily  furosemide   Injectable 40 milliGRAM(s) IV Push two times a day  glucagon  Injectable 1 milliGRAM(s) IntraMuscular once  insulin lispro (ADMELOG) corrective regimen sliding scale   SubCutaneous three times a day before meals  levothyroxine 150 MICROGram(s) Oral daily  metoprolol tartrate 50 milliGRAM(s) Oral two times a day  pantoprazole    Tablet 40 milliGRAM(s) Oral before breakfast  tamsulosin 0.4 milliGRAM(s) Oral at bedtime  valsartan 80 milliGRAM(s) Oral daily    MEDICATIONS  (PRN):  dextrose Oral Gel 15 Gram(s) Oral once PRN Blood Glucose LESS THAN 70 milliGRAM(s)/deciliter  melatonin 3 milliGRAM(s) Oral at bedtime PRN Insomnia    -check echo  -trend enzymes  -cardio eval  -supplement magnesium IV  -ambulate  -monitor sa02 on room air  -on Lasix 40mg IV q12 - likely transition to PO in am  -strict I&O's  -daily weights  -fluid restricted diet  -placed on remote tele this morning    will follow

## 2023-08-07 NOTE — PATIENT PROFILE ADULT - FALL HARM RISK - HARM RISK INTERVENTIONS

## 2023-08-07 NOTE — PROGRESS NOTE ADULT - SUBJECTIVE AND OBJECTIVE BOX
SUBJECTIVE:    Patient is a 77y old Male who presents with a chief complaint of chest pain.    Today is hospital day 1d. This morning he is resting comfortably in bed and reports no new issues or overnight events. Patient has no complaints this morning, was asking for food.    PAST MEDICAL & SURGICAL HISTORY  Hypertension complications    GERD (gastroesophageal reflux disease)    ETELVINA (obstructive sleep apnea)  NOT USING cpap    CAD (coronary artery disease)  STENTS 2000 AND 2002 (4 stents)    DVT (deep venous thrombosis)  LEFT LE- 2008 AND 2018 AFTER INJURY    Seizure  last episode 2 yrs ago-REACTION TO CIPRO    High cholesterol    Hypothyroid    Pain    Back pain  chronic    Esophagitis    BPH (benign prostatic hyperplasia)    Myocardial infarction  2000    Afib  Sep 2019; on Xarelto    DM (diabetes mellitus)    HTN (hypertension)    Obesity    Sleep apnea  Dx 10 yrs ago; denies CPAP use    Dry eyes    H/O hernia repair  RT INGUINAL -2 AND LT INGUINAL -1    H/O arthroscopy of knee  RT -2 AND LT -1    CAD (coronary artery disease)  stents (4)    History of parathyroid surgery    H/O arthroscopy of shoulder  RT ROTATOR CUFF 2019    S/P cataract surgery  rt and lt    Kidney stone  RT ESWL OCT 2019      SOCIAL HISTORY:  Negative for smoking/alcohol/drug use.     ALLERGIES:  Cipro (Other)    MEDICATIONS:  STANDING MEDICATIONS  aspirin  chewable 81 milliGRAM(s) Oral daily  atorvastatin 80 milliGRAM(s) Oral at bedtime  dicyclomine 20 milliGRAM(s) Oral three times a day before meals  enoxaparin Injectable 130 milliGRAM(s) SubCutaneous every 12 hours  famotidine    Tablet 40 milliGRAM(s) Oral daily  fenofibrate Tablet 145 milliGRAM(s) Oral daily  finasteride 5 milliGRAM(s) Oral daily  furosemide   Injectable 40 milliGRAM(s) IV Push two times a day  levothyroxine 150 MICROGram(s) Oral daily  metoprolol tartrate 50 milliGRAM(s) Oral two times a day  pantoprazole    Tablet 40 milliGRAM(s) Oral before breakfast  tamsulosin 0.4 milliGRAM(s) Oral at bedtime  valsartan 80 milliGRAM(s) Oral daily    PRN MEDICATIONS  melatonin 3 milliGRAM(s) Oral at bedtime PRN    VITALS:   T(F): 97.9  HR: 75  BP: 120/79  RR: 18  SpO2: 95%    LABS:                        12.2   7.28  )-----------( 147      ( 07 Aug 2023 06:56 )             37.6     08-07    140  |  101  |  14  ----------------------------<  143<H>  3.9   |  26  |  0.9    Ca    10.0      07 Aug 2023 06:56    TPro  6.9  /  Alb  4.5  /  TBili  0.8  /  DBili  x   /  AST  20  /  ALT  22  /  AlkPhos  53  08-07      Urinalysis Basic - ( 07 Aug 2023 06:56 )    Color: x / Appearance: x / SG: x / pH: x  Gluc: 143 mg/dL / Ketone: x  / Bili: x / Urobili: x   Blood: x / Protein: x / Nitrite: x   Leuk Esterase: x / RBC: x / WBC x   Sq Epi: x / Non Sq Epi: x / Bacteria: x        Troponin T, Serum: 0.02 ng/mL *H* (08-07-23 @ 06:56)  Troponin T, Serum: 0.02 ng/mL *H* (08-07-23 @ 01:15)  Troponin T, Serum: 0.02 ng/mL *H* (08-06-23 @ 13:30)      CARDIAC MARKERS ( 07 Aug 2023 06:56 )  x     / 0.02 ng/mL / x     / x     / x      CARDIAC MARKERS ( 07 Aug 2023 01:15 )  x     / 0.02 ng/mL / x     / x     / x      CARDIAC MARKERS ( 06 Aug 2023 13:30 )  x     / 0.02 ng/mL / x     / x     / x          RADIOLOGY:    PHYSICAL EXAM:  GEN: No acute distress  LUNGS: Clear to auscultation bilaterally   HEART: Regular  ABD: Soft, non-tender, non-distended.  EXT: NC/NC/NE/2+PP/RICKETTS/Skin Intact.   NEURO: [id number]

## 2023-08-08 ENCOUNTER — APPOINTMENT (OUTPATIENT)
Dept: PULMONOLOGY | Facility: CLINIC | Age: 78
End: 2023-08-08

## 2023-08-08 LAB
A1C WITH ESTIMATED AVERAGE GLUCOSE RESULT: 7.3 % — HIGH (ref 4–5.6)
ANION GAP SERPL CALC-SCNC: 14 MMOL/L — SIGNIFICANT CHANGE UP (ref 7–14)
BUN SERPL-MCNC: 16 MG/DL — SIGNIFICANT CHANGE UP (ref 10–20)
CALCIUM SERPL-MCNC: 9.1 MG/DL — SIGNIFICANT CHANGE UP (ref 8.4–10.5)
CHLORIDE SERPL-SCNC: 102 MMOL/L — SIGNIFICANT CHANGE UP (ref 98–110)
CO2 SERPL-SCNC: 25 MMOL/L — SIGNIFICANT CHANGE UP (ref 17–32)
CREAT SERPL-MCNC: 0.9 MG/DL — SIGNIFICANT CHANGE UP (ref 0.7–1.5)
EGFR: 89 ML/MIN/1.73M2 — SIGNIFICANT CHANGE UP
ESTIMATED AVERAGE GLUCOSE: 163 MG/DL — HIGH (ref 68–114)
GLUCOSE BLDC GLUCOMTR-MCNC: 188 MG/DL — HIGH (ref 70–99)
GLUCOSE BLDC GLUCOMTR-MCNC: 188 MG/DL — HIGH (ref 70–99)
GLUCOSE BLDC GLUCOMTR-MCNC: 204 MG/DL — HIGH (ref 70–99)
GLUCOSE BLDC GLUCOMTR-MCNC: 214 MG/DL — HIGH (ref 70–99)
GLUCOSE SERPL-MCNC: 149 MG/DL — HIGH (ref 70–99)
MAGNESIUM SERPL-MCNC: 1.8 MG/DL — SIGNIFICANT CHANGE UP (ref 1.8–2.4)
POTASSIUM SERPL-MCNC: 3.7 MMOL/L — SIGNIFICANT CHANGE UP (ref 3.5–5)
POTASSIUM SERPL-SCNC: 3.7 MMOL/L — SIGNIFICANT CHANGE UP (ref 3.5–5)
SODIUM SERPL-SCNC: 141 MMOL/L — SIGNIFICANT CHANGE UP (ref 135–146)

## 2023-08-08 PROCEDURE — 99233 SBSQ HOSP IP/OBS HIGH 50: CPT

## 2023-08-08 PROCEDURE — 99223 1ST HOSP IP/OBS HIGH 75: CPT

## 2023-08-08 RX ORDER — FUROSEMIDE 40 MG
40 TABLET ORAL
Refills: 0 | Status: DISCONTINUED | OUTPATIENT
Start: 2023-08-08 | End: 2023-08-08

## 2023-08-08 RX ORDER — POTASSIUM CHLORIDE 20 MEQ
40 PACKET (EA) ORAL ONCE
Refills: 0 | Status: COMPLETED | OUTPATIENT
Start: 2023-08-08 | End: 2023-08-08

## 2023-08-08 RX ORDER — ACETAMINOPHEN 500 MG
650 TABLET ORAL EVERY 6 HOURS
Refills: 0 | Status: DISCONTINUED | OUTPATIENT
Start: 2023-08-08 | End: 2023-08-10

## 2023-08-08 RX ORDER — FUROSEMIDE 40 MG
40 TABLET ORAL ONCE
Refills: 0 | Status: DISCONTINUED | OUTPATIENT
Start: 2023-08-08 | End: 2023-08-08

## 2023-08-08 RX ORDER — FUROSEMIDE 40 MG
40 TABLET ORAL
Refills: 0 | Status: DISCONTINUED | OUTPATIENT
Start: 2023-08-09 | End: 2023-08-10

## 2023-08-08 RX ORDER — MAGNESIUM SULFATE 500 MG/ML
2 VIAL (ML) INJECTION ONCE
Refills: 0 | Status: COMPLETED | OUTPATIENT
Start: 2023-08-08 | End: 2023-08-08

## 2023-08-08 RX ADMIN — Medication 2: at 08:13

## 2023-08-08 RX ADMIN — Medication 25 GRAM(S): at 11:24

## 2023-08-08 RX ADMIN — Medication 145 MILLIGRAM(S): at 11:21

## 2023-08-08 RX ADMIN — Medication 20 MILLIGRAM(S): at 05:41

## 2023-08-08 RX ADMIN — TAMSULOSIN HYDROCHLORIDE 0.4 MILLIGRAM(S): 0.4 CAPSULE ORAL at 21:19

## 2023-08-08 RX ADMIN — VALSARTAN 80 MILLIGRAM(S): 80 TABLET ORAL at 05:41

## 2023-08-08 RX ADMIN — Medication 4: at 11:22

## 2023-08-08 RX ADMIN — ATORVASTATIN CALCIUM 80 MILLIGRAM(S): 80 TABLET, FILM COATED ORAL at 21:18

## 2023-08-08 RX ADMIN — FINASTERIDE 5 MILLIGRAM(S): 5 TABLET, FILM COATED ORAL at 11:21

## 2023-08-08 RX ADMIN — Medication 650 MILLIGRAM(S): at 17:25

## 2023-08-08 RX ADMIN — Medication 40 MILLIEQUIVALENT(S): at 11:23

## 2023-08-08 RX ADMIN — Medication 150 MICROGRAM(S): at 05:41

## 2023-08-08 RX ADMIN — Medication 650 MILLIGRAM(S): at 18:21

## 2023-08-08 RX ADMIN — PANTOPRAZOLE SODIUM 40 MILLIGRAM(S): 20 TABLET, DELAYED RELEASE ORAL at 05:41

## 2023-08-08 RX ADMIN — FAMOTIDINE 40 MILLIGRAM(S): 10 INJECTION INTRAVENOUS at 15:42

## 2023-08-08 RX ADMIN — Medication 4: at 17:05

## 2023-08-08 RX ADMIN — Medication 20 MILLIGRAM(S): at 16:59

## 2023-08-08 RX ADMIN — ENOXAPARIN SODIUM 130 MILLIGRAM(S): 100 INJECTION SUBCUTANEOUS at 17:03

## 2023-08-08 RX ADMIN — Medication 40 MILLIGRAM(S): at 05:42

## 2023-08-08 RX ADMIN — Medication 50 MILLIGRAM(S): at 17:05

## 2023-08-08 RX ADMIN — ENOXAPARIN SODIUM 130 MILLIGRAM(S): 100 INJECTION SUBCUTANEOUS at 06:52

## 2023-08-08 RX ADMIN — Medication 81 MILLIGRAM(S): at 11:21

## 2023-08-08 RX ADMIN — Medication 50 MILLIGRAM(S): at 05:41

## 2023-08-08 RX ADMIN — Medication 20 MILLIGRAM(S): at 11:21

## 2023-08-08 RX ADMIN — Medication 40 MILLIGRAM(S): at 13:22

## 2023-08-08 NOTE — CONSULT NOTE ADULT - SUBJECTIVE AND OBJECTIVE BOX
Cardiologist:    HPI:  77-year-old male with a pmhx of HTN, HLD, CAD s/p 5 PCIs, DM, Afib s/p DCCV on xarelto presented to the ED from urgent care complaining of chest pain and dyspnea on exertion. Patient said he has been sweating profusely since one week, and unable to sleep with increasing LE edema BL. Today patient started having chest pressure in the middle of the chest, nonradiating, patient describes its different from previous heart attacks, and felt tapia when going upstairs. He went to  were they loaded with asprin and chest pain resolved, he was found to be in Afib and was sent to the ED. Denies nausea, vomiting, or other complaints.  pt mentions to have been on HRT over the past 4 weeks.    In the ED trops were 0.02 (baseline last year was<0.01), BNP 1655, they did a CTA which did not show a PE but showed BL GGO and septal thickening compatible with pulmonary edema. EKG showed only showed T-wave inversion in lead III. He was given lasix 40 IV and admitted to medicine     ED VS:   · Temp (F)	99.4  · Temp (C) Temp (C)	37.4  · Temp site Temp Site	oral  · Heart Rate Heart Rate (beats/min)	88  · BP Systolic Systolic	144  · BP Diastolic Diastolic (mm Hg)	85  · Respiration Rate (breaths/min) Respiration Rate (breaths/min)	18  · SpO2 (%) SpO2 (%)	95         (06 Aug 2023 23:08)      Cardiology Consult HPI:  78 yo M w HTN, CAD s/p PCI ( 2019-significant double vessel coronary artery disease (RCA and LCX) s/p successful PCI of proximal RCA), AF s/p DCCV   PAST MEDICAL & SURGICAL HISTORY  Hypertension complications    GERD (gastroesophageal reflux disease)    ETELVINA (obstructive sleep apnea)  NOT USING cpap    CAD (coronary artery disease)  STENTS 2000 AND 2002 (4 stents)    DVT (deep venous thrombosis)  LEFT LE- 2008 AND 2018 AFTER INJURY    Seizure  last episode 2 yrs ago-REACTION TO CIPRO    High cholesterol    Hypothyroid    Pain    Back pain  chronic    Esophagitis    BPH (benign prostatic hyperplasia)    Myocardial infarction  2000    Afib  Sep 2019; on Xarelto    DM (diabetes mellitus)    HTN (hypertension)    Obesity    Sleep apnea  Dx 10 yrs ago; denies CPAP use    Dry eyes    H/O hernia repair  RT INGUINAL -2 AND LT INGUINAL -1    H/O arthroscopy of knee  RT -2 AND LT -1    CAD (coronary artery disease)  stents (4)    History of parathyroid surgery    H/O arthroscopy of shoulder  RT ROTATOR CUFF 2019    S/P cataract surgery  rt and lt    Kidney stone  RT ESWL OCT 2019        FAMILY HISTORY:  FAMILY HISTORY:    [ ] no pertinent family history of premature cardiovascular disease in first degree relatives.  Mother:   Father:   Siblings:     SOCIAL HISTORY:  []smoker  []Alcohol  []Drug    ALLERGIES:  Cipro (Other)      MEDICATIONS:  MEDICATIONS  (STANDING):  aspirin  chewable 81 milliGRAM(s) Oral daily  atorvastatin 80 milliGRAM(s) Oral at bedtime  dextrose 5%. 1000 milliLiter(s) (50 mL/Hr) IV Continuous <Continuous>  dextrose 5%. 1000 milliLiter(s) (100 mL/Hr) IV Continuous <Continuous>  dextrose 50% Injectable 25 Gram(s) IV Push once  dextrose 50% Injectable 12.5 Gram(s) IV Push once  dextrose 50% Injectable 25 Gram(s) IV Push once  dicyclomine 20 milliGRAM(s) Oral three times a day before meals  enoxaparin Injectable 130 milliGRAM(s) SubCutaneous every 12 hours  famotidine    Tablet 40 milliGRAM(s) Oral daily  fenofibrate Tablet 145 milliGRAM(s) Oral daily  finasteride 5 milliGRAM(s) Oral daily  furosemide   Injectable 40 milliGRAM(s) IV Push once  glucagon  Injectable 1 milliGRAM(s) IntraMuscular once  insulin lispro (ADMELOG) corrective regimen sliding scale   SubCutaneous three times a day before meals  levothyroxine 150 MICROGram(s) Oral daily  metoprolol tartrate 50 milliGRAM(s) Oral two times a day  pantoprazole    Tablet 40 milliGRAM(s) Oral before breakfast  tamsulosin 0.4 milliGRAM(s) Oral at bedtime  valsartan 80 milliGRAM(s) Oral daily    MEDICATIONS  (PRN):  dextrose Oral Gel 15 Gram(s) Oral once PRN Blood Glucose LESS THAN 70 milliGRAM(s)/deciliter  melatonin 3 milliGRAM(s) Oral at bedtime PRN Insomnia      HOME MEDICATIONS:  Home Medications:  dicyclomine 20 mg oral tablet: 3 times a day (23 Aug 2022 22:06)  famotidine 40 mg oral tablet: 1 tab(s) orally once a day (at bedtime) (23 Aug 2022 22:06)  fenofibrate 145 mg oral tablet:  (23 Aug 2022 22:06)  finasteride 5 mg oral tablet: 1 orally once a day (06 Aug 2023 23:27)  Flomax 0.4 mg oral capsule: 1 cap(s) orally once a day (23 Aug 2022 22:06)  Lasix 40 mg oral tablet: 1 tab(s) orally once a day (23 Aug 2022 22:06)  levothyroxine 150 mcg (0.15 mg) oral tablet:  (23 Aug 2022 22:06)  metFORMIN 500 mg oral tablet: 1 tab(s) orally 2 times a day  HOLD METFORMIN FOR 48 HRS AFTER CARDIAC CATH. (23 Aug 2022 22:06)  metoprolol tartrate 50 mg oral tablet: 1 tab(s) orally 2 times a day (23 Aug 2022 22:06)  omeprazole 40 mg oral delayed release capsule: 1 cap(s) orally once a day (23 Aug 2022 22:06)  rosuvastatin 40 mg oral tablet: 1 tab(s) orally once a day (23 Aug 2022 22:06)  traMADol 50 mg oral tablet: 1 orally 2 times a day (06 Aug 2023 23:29)  valsartan 80 mg oral tablet: 2 tablets daily (23 Aug 2022 22:06)  Xarelto 20 mg oral tablet: 1 tab(s) orally once a day (in the evening) (23 Aug 2022 22:06)      VITALS:   T(F): 97.5 (08-08 @ 16:00), Max: 99.4 (08-06 @ 12:06)  HR: 88 (08-08 @ 16:00) (75 - 96)  BP: 117/61 (08-08 @ 16:00) (117/61 - 169/77)  BP(mean): 110 (08-08 @ 05:49) (110 - 110)  RR: 18 (08-08 @ 16:00) (18 - 18)  SpO2: 97% (08-08 @ 08:05) (93% - 97%)    I&O's Summary    07 Aug 2023 07:01  -  08 Aug 2023 07:00  --------------------------------------------------------  IN: 700 mL / OUT: 1000 mL / NET: -300 mL        REVIEW OF SYSTEMS:    Negative except as mentioned in HPI    CONSTITUTIONAL: No weakness, fevers or chills  EYES: No visual changes  ENT: No vertigo or throat pain   NECK: No pain or stiffness  RESPIRATORY: No cough, wheezing, hemoptysis; No shortness of breath  CARDIOVASCULAR: No chest pain or palpitations  GASTROINTESTINAL: No abdominal or epigastric pain. No nausea, vomiting, or hematemesis; No diarrhea or constipation. No melena or hematochezia.  GENITOURINARY: No dysuria, frequency or hematuria  NEUROLOGICAL: No numbness or weakness  SKIN: No itching, no rashes  MSK: FROM x4    PHYSICAL EXAM:  NEURO: Awake , alert and oriented  GEN: Not in acute distress  NECK: No JVD  LUNGS: Clear to auscultation bilaterally   CARDIOVASCULAR: S1/S2 present, RRR , no murmurs or rubs, no carotid bruits,  + PP bilaterally  ABD: Soft, non-tender, non-distended, +BS  EXT: No SOCORRO  SKIN: Intact    LABS:                        12.2   7.28  )-----------( 147      ( 07 Aug 2023 06:56 )             37.6     08-08    141  |  102  |  16  ----------------------------<  149<H>  3.7   |  25  |  0.9    Ca    9.1      08 Aug 2023 05:37  Mg     1.8     08-08    TPro  6.9  /  Alb  4.5  /  TBili  0.8  /  DBili  x   /  AST  20  /  ALT  22  /  AlkPhos  53  08-07        CARDIAC MARKERS ( 07 Aug 2023 06:56 )  x     / 0.02 ng/mL / x     / x     / x      CARDIAC MARKERS ( 07 Aug 2023 01:15 )  x     / 0.02 ng/mL / x     / x     / x            Troponin trend:          RADIOLOGY:  -CXR:  -TTE:  -CCTA:  -STRESS TEST:  -CATHETERIZATION:    ECG:    TELEMETRY EVENTS:   Cardiologist:    HPI:  77-year-old male with a pmhx of HTN, HLD, CAD s/p 5 PCIs, DM, Afib s/p DCCV on xarelto presented to the ED from urgent care complaining of chest pain and dyspnea on exertion. Patient said he has been sweating profusely since one week, and unable to sleep with increasing LE edema BL. Today patient started having chest pressure in the middle of the chest, nonradiating, patient describes its different from previous heart attacks, and felt tapia when going upstairs. He went to  were they loaded with asprin and chest pain resolved, he was found to be in Afib and was sent to the ED. Denies nausea, vomiting, or other complaints.  pt mentions to have been on HRT over the past 4 weeks.    In the ED trops were 0.02 (baseline last year was<0.01), BNP 1655, they did a CTA which did not show a PE but showed BL GGO and septal thickening compatible with pulmonary edema. EKG showed only showed T-wave inversion in lead III. He was given lasix 40 IV and admitted to medicine     ED VS:   · Temp (F)	99.4  · Temp (C) Temp (C)	37.4  · Temp site Temp Site	oral  · Heart Rate Heart Rate (beats/min)	88  · BP Systolic Systolic	144  · BP Diastolic Diastolic (mm Hg)	85  · Respiration Rate (breaths/min) Respiration Rate (breaths/min)	18  · SpO2 (%) SpO2 (%)	95         (06 Aug 2023 23:08)      Cardiology Consult HPI:  76 yo M w HTN, CAD s/p PCI ( 2019-significant double vessel coronary artery disease (RCA and LCX) s/p successful PCI of proximal RCA), AF s/p DCCV pw c/o not feeling well, SOB, diaphoresis. No CP. Feeling better now. Also reported orthopnea and leg swelling. Has been diuresed with IV lasix and continue don metoprolol 50 mg BID.   PAST MEDICAL & SURGICAL HISTORY  Hypertension complications    GERD (gastroesophageal reflux disease)    ETELVINA (obstructive sleep apnea)  NOT USING cpap    CAD (coronary artery disease)  STENTS 2000 AND 2002 (4 stents)    DVT (deep venous thrombosis)  LEFT LE- 2008 AND 2018 AFTER INJURY    Seizure  last episode 2 yrs ago-REACTION TO CIPRO    High cholesterol    Hypothyroid    Pain    Back pain  chronic    Esophagitis    BPH (benign prostatic hyperplasia)    Myocardial infarction  2000    Afib  Sep 2019; on Xarelto    DM (diabetes mellitus)    HTN (hypertension)    Obesity    Sleep apnea  Dx 10 yrs ago; denies CPAP use    Dry eyes    H/O hernia repair  RT INGUINAL -2 AND LT INGUINAL -1    H/O arthroscopy of knee  RT -2 AND LT -1    CAD (coronary artery disease)  stents (4)    History of parathyroid surgery    H/O arthroscopy of shoulder  RT ROTATOR CUFF 2019    S/P cataract surgery  rt and lt    Kidney stone  RT ESWL OCT 2019        FAMILY HISTORY:  FAMILY HISTORY:    [ ] no pertinent family history of premature cardiovascular disease in first degree relatives.  Mother:   Father:   Siblings:     SOCIAL HISTORY:  []smoker  []Alcohol  []Drug    ALLERGIES:  Cipro (Other)      MEDICATIONS:  MEDICATIONS  (STANDING):  aspirin  chewable 81 milliGRAM(s) Oral daily  atorvastatin 80 milliGRAM(s) Oral at bedtime  dextrose 5%. 1000 milliLiter(s) (50 mL/Hr) IV Continuous <Continuous>  dextrose 5%. 1000 milliLiter(s) (100 mL/Hr) IV Continuous <Continuous>  dextrose 50% Injectable 25 Gram(s) IV Push once  dextrose 50% Injectable 12.5 Gram(s) IV Push once  dextrose 50% Injectable 25 Gram(s) IV Push once  dicyclomine 20 milliGRAM(s) Oral three times a day before meals  enoxaparin Injectable 130 milliGRAM(s) SubCutaneous every 12 hours  famotidine    Tablet 40 milliGRAM(s) Oral daily  fenofibrate Tablet 145 milliGRAM(s) Oral daily  finasteride 5 milliGRAM(s) Oral daily  furosemide   Injectable 40 milliGRAM(s) IV Push once  glucagon  Injectable 1 milliGRAM(s) IntraMuscular once  insulin lispro (ADMELOG) corrective regimen sliding scale   SubCutaneous three times a day before meals  levothyroxine 150 MICROGram(s) Oral daily  metoprolol tartrate 50 milliGRAM(s) Oral two times a day  pantoprazole    Tablet 40 milliGRAM(s) Oral before breakfast  tamsulosin 0.4 milliGRAM(s) Oral at bedtime  valsartan 80 milliGRAM(s) Oral daily    MEDICATIONS  (PRN):  dextrose Oral Gel 15 Gram(s) Oral once PRN Blood Glucose LESS THAN 70 milliGRAM(s)/deciliter  melatonin 3 milliGRAM(s) Oral at bedtime PRN Insomnia      HOME MEDICATIONS:  Home Medications:  dicyclomine 20 mg oral tablet: 3 times a day (23 Aug 2022 22:06)  famotidine 40 mg oral tablet: 1 tab(s) orally once a day (at bedtime) (23 Aug 2022 22:06)  fenofibrate 145 mg oral tablet:  (23 Aug 2022 22:06)  finasteride 5 mg oral tablet: 1 orally once a day (06 Aug 2023 23:27)  Flomax 0.4 mg oral capsule: 1 cap(s) orally once a day (23 Aug 2022 22:06)  Lasix 40 mg oral tablet: 1 tab(s) orally once a day (23 Aug 2022 22:06)  levothyroxine 150 mcg (0.15 mg) oral tablet:  (23 Aug 2022 22:06)  metFORMIN 500 mg oral tablet: 1 tab(s) orally 2 times a day  HOLD METFORMIN FOR 48 HRS AFTER CARDIAC CATH. (23 Aug 2022 22:06)  metoprolol tartrate 50 mg oral tablet: 1 tab(s) orally 2 times a day (23 Aug 2022 22:06)  omeprazole 40 mg oral delayed release capsule: 1 cap(s) orally once a day (23 Aug 2022 22:06)  rosuvastatin 40 mg oral tablet: 1 tab(s) orally once a day (23 Aug 2022 22:06)  traMADol 50 mg oral tablet: 1 orally 2 times a day (06 Aug 2023 23:29)  valsartan 80 mg oral tablet: 2 tablets daily (23 Aug 2022 22:06)  Xarelto 20 mg oral tablet: 1 tab(s) orally once a day (in the evening) (23 Aug 2022 22:06)      VITALS:   T(F): 97.5 (08-08 @ 16:00), Max: 99.4 (08-06 @ 12:06)  HR: 88 (08-08 @ 16:00) (75 - 96)  BP: 117/61 (08-08 @ 16:00) (117/61 - 169/77)  BP(mean): 110 (08-08 @ 05:49) (110 - 110)  RR: 18 (08-08 @ 16:00) (18 - 18)  SpO2: 97% (08-08 @ 08:05) (93% - 97%)    I&O's Summary    07 Aug 2023 07:01  -  08 Aug 2023 07:00  --------------------------------------------------------  IN: 700 mL / OUT: 1000 mL / NET: -300 mL        REVIEW OF SYSTEMS:    Negative except as mentioned in HPI    CONSTITUTIONAL: No weakness, fevers or chills  EYES: No visual changes  ENT: No vertigo or throat pain   NECK: No pain or stiffness  RESPIRATORY: No cough, wheezing, hemoptysis; No shortness of breath  CARDIOVASCULAR: No chest pain or palpitations  GASTROINTESTINAL: No abdominal or epigastric pain. No nausea, vomiting, or hematemesis; No diarrhea or constipation. No melena or hematochezia.  GENITOURINARY: No dysuria, frequency or hematuria  NEUROLOGICAL: No numbness or weakness  SKIN: No itching, no rashes  MSK: FROM x4    PHYSICAL EXAM:  NEURO: Awake , alert and oriented  GEN: Not in acute distress  NECK: No JVD  LUNGS: Clear to auscultation bilaterally   CARDIOVASCULAR: S1/S2 present, RRR , no murmurs or rubs, no carotid bruits,  + PP bilaterally  ABD: Soft, non-tender, non-distended, +BS  EXT: No SOCORRO  SKIN: Intact    LABS:                        12.2   7.28  )-----------( 147      ( 07 Aug 2023 06:56 )             37.6     08-08    141  |  102  |  16  ----------------------------<  149<H>  3.7   |  25  |  0.9    Ca    9.1      08 Aug 2023 05:37  Mg     1.8     08-08    TPro  6.9  /  Alb  4.5  /  TBili  0.8  /  DBili  x   /  AST  20  /  ALT  22  /  AlkPhos  53  08-07        CARDIAC MARKERS ( 07 Aug 2023 06:56 )  x     / 0.02 ng/mL / x     / x     / x      CARDIAC MARKERS ( 07 Aug 2023 01:15 )  x     / 0.02 ng/mL / x     / x     / x            Troponin trend:          RADIOLOGY:  -CXR: +congestion   -TTE:   1. Technically difficult study.   2. Endocardial visualization was enhanced with intravenous echo contrast.   3. Left ventricular ejection fraction, by visual estimation, is >55%.   4. Normal global left ventricular systolic function.   5. The left ventricular diastolic function could not be assessed in this   study.   6. Left atrial enlargement.   7. Mild dilatation of the ascending aorta.      ECG:  AF w RVR   TELEMETRY EVENTS:  F w RVR upto 120s    HPI:  77-year-old male with a pmhx of HTN, HLD, CAD s/p 5 PCIs, DM, Afib s/p DCCV on xarelto presented to the ED from urgent care complaining of chest pain and dyspnea on exertion. Patient said he has been sweating profusely since one week, and unable to sleep with increasing LE edema BL. Today patient started having chest pressure in the middle of the chest, nonradiating, patient describes its different from previous heart attacks, and felt tapia when going upstairs. He went to  were they loaded with asprin and chest pain resolved, he was found to be in Afib and was sent to the ED. Denies nausea, vomiting, or other complaints.  pt mentions to have been on HRT over the past 4 weeks.    In the ED trops were 0.02 (baseline last year was<0.01), BNP 1655, they did a CTA which did not show a PE but showed BL GGO and septal thickening compatible with pulmonary edema. EKG showed only showed T-wave inversion in lead III. He was given lasix 40 IV and admitted to medicine     ED VS:   · Temp (F)	99.4  · Temp (C) Temp (C)	37.4  · Temp site Temp Site	oral  · Heart Rate Heart Rate (beats/min)	88  · BP Systolic Systolic	144  · BP Diastolic Diastolic (mm Hg)	85  · Respiration Rate (breaths/min) Respiration Rate (breaths/min)	18  · SpO2 (%) SpO2 (%)	95        Cardiology Consult HPI:  78 yo M w HTN, CAD s/p PCI ( 2019-significant double vessel coronary artery disease (RCA and LCX) s/p successful PCI of proximal RCA), AF s/p DCCV pw c/o not feeling well, SOB, diaphoresis. No CP. Feeling better now. Also reported orthopnea and leg swelling. Has been diuresed with IV lasix and continue don metoprolol 50 mg BID.       PAST MEDICAL & SURGICAL HISTORY  Hypertension complications    GERD (gastroesophageal reflux disease)    ETELVINA (obstructive sleep apnea)  NOT USING cpap    CAD (coronary artery disease)  STENTS 2000 AND 2002 (4 stents)    DVT (deep venous thrombosis)  LEFT LE- 2008 AND 2018 AFTER INJURY    Seizure  last episode 2 yrs ago-REACTION TO CIPRO    High cholesterol    Hypothyroid    Pain    Back pain  chronic    Esophagitis    BPH (benign prostatic hyperplasia)    Myocardial infarction  2000    Afib  Sep 2019; on Xarelto    DM (diabetes mellitus)    HTN (hypertension)    Obesity    Sleep apnea  Dx 10 yrs ago; denies CPAP use    Dry eyes    H/O hernia repair  RT INGUINAL -2 AND LT INGUINAL -1    H/O arthroscopy of knee  RT -2 AND LT -1    CAD (coronary artery disease)  stents (4)    History of parathyroid surgery    H/O arthroscopy of shoulder  RT ROTATOR CUFF 2019    S/P cataract surgery  rt and lt    Kidney stone  RT ESWL OCT 2019      ALLERGIES:  Cipro (Other)      MEDICATIONS:  MEDICATIONS  (STANDING):  aspirin  chewable 81 milliGRAM(s) Oral daily  atorvastatin 80 milliGRAM(s) Oral at bedtime  dextrose 5%. 1000 milliLiter(s) (50 mL/Hr) IV Continuous <Continuous>  dextrose 5%. 1000 milliLiter(s) (100 mL/Hr) IV Continuous <Continuous>  dextrose 50% Injectable 25 Gram(s) IV Push once  dextrose 50% Injectable 12.5 Gram(s) IV Push once  dextrose 50% Injectable 25 Gram(s) IV Push once  dicyclomine 20 milliGRAM(s) Oral three times a day before meals  enoxaparin Injectable 130 milliGRAM(s) SubCutaneous every 12 hours  famotidine    Tablet 40 milliGRAM(s) Oral daily  fenofibrate Tablet 145 milliGRAM(s) Oral daily  finasteride 5 milliGRAM(s) Oral daily  furosemide   Injectable 40 milliGRAM(s) IV Push once  glucagon  Injectable 1 milliGRAM(s) IntraMuscular once  insulin lispro (ADMELOG) corrective regimen sliding scale   SubCutaneous three times a day before meals  levothyroxine 150 MICROGram(s) Oral daily  metoprolol tartrate 50 milliGRAM(s) Oral two times a day  pantoprazole    Tablet 40 milliGRAM(s) Oral before breakfast  tamsulosin 0.4 milliGRAM(s) Oral at bedtime  valsartan 80 milliGRAM(s) Oral daily    MEDICATIONS  (PRN):  dextrose Oral Gel 15 Gram(s) Oral once PRN Blood Glucose LESS THAN 70 milliGRAM(s)/deciliter  melatonin 3 milliGRAM(s) Oral at bedtime PRN Insomnia      HOME MEDICATIONS:  Home Medications:  dicyclomine 20 mg oral tablet: 3 times a day (23 Aug 2022 22:06)  famotidine 40 mg oral tablet: 1 tab(s) orally once a day (at bedtime) (23 Aug 2022 22:06)  fenofibrate 145 mg oral tablet:  (23 Aug 2022 22:06)  finasteride 5 mg oral tablet: 1 orally once a day (06 Aug 2023 23:27)  Flomax 0.4 mg oral capsule: 1 cap(s) orally once a day (23 Aug 2022 22:06)  Lasix 40 mg oral tablet: 1 tab(s) orally once a day (23 Aug 2022 22:06)  levothyroxine 150 mcg (0.15 mg) oral tablet:  (23 Aug 2022 22:06)  metFORMIN 500 mg oral tablet: 1 tab(s) orally 2 times a day  HOLD METFORMIN FOR 48 HRS AFTER CARDIAC CATH. (23 Aug 2022 22:06)  metoprolol tartrate 50 mg oral tablet: 1 tab(s) orally 2 times a day (23 Aug 2022 22:06)  omeprazole 40 mg oral delayed release capsule: 1 cap(s) orally once a day (23 Aug 2022 22:06)  rosuvastatin 40 mg oral tablet: 1 tab(s) orally once a day (23 Aug 2022 22:06)  traMADol 50 mg oral tablet: 1 orally 2 times a day (06 Aug 2023 23:29)  valsartan 80 mg oral tablet: 2 tablets daily (23 Aug 2022 22:06)  Xarelto 20 mg oral tablet: 1 tab(s) orally once a day (in the evening) (23 Aug 2022 22:06)      VITALS:   T(F): 97.5 (08-08 @ 16:00), Max: 99.4 (08-06 @ 12:06)  HR: 88 (08-08 @ 16:00) (75 - 96)  BP: 117/61 (08-08 @ 16:00) (117/61 - 169/77)  BP(mean): 110 (08-08 @ 05:49) (110 - 110)  RR: 18 (08-08 @ 16:00) (18 - 18)  SpO2: 97% (08-08 @ 08:05) (93% - 97%)    I&O's Summary    07 Aug 2023 07:01  -  08 Aug 2023 07:00  --------------------------------------------------------  IN: 700 mL / OUT: 1000 mL / NET: -300 mL        REVIEW OF SYSTEMS:  CONSTITUTIONAL: No fever, weight loss, fatigue  NECK: No pain or stiffness  RESPIRATORY: See HPI  CARDIOVASCULAR: See HPI  GASTROINTESTINAL: No abdominal/epigastric pain, nausea, vomiting, hematemesis, diarrhea, constipation, melena or hematochezia  GENITOURINARY: No dysuria, frequency, hematuria, incontinence  NEUROLOGICAL: No headaches, memory loss, loss of strength, numbness, tremors  SKIN: No itching, burning, rashes, lesions   ENDOCRINE: No heat/cold intolerance or hair loss  MUSCULOSKELETAL: No joint pain or swelling  HEME/LYMPH: No easy bruising or bleeding gums    PHYSICAL EXAM:  General: NAD, AAOx3  HEENT: NCAT, EOMI  Neck: supple, no JVD  CV: irregular, no murmurs, no edema  Respiratory: CTA bilaterally, no wheeze, rales or rhonchi	  Abdomen: soft, NT/ND, +BS  Extremities: warm, ROM nl  Neuro: Nonfocal      LABS:                        12.2   7.28  )-----------( 147      ( 07 Aug 2023 06:56 )             37.6     08-08    141  |  102  |  16  ----------------------------<  149<H>  3.7   |  25  |  0.9    Ca    9.1      08 Aug 2023 05:37  Mg     1.8     08-08    TPro  6.9  /  Alb  4.5  /  TBili  0.8  /  DBili  x   /  AST  20  /  ALT  22  /  AlkPhos  53  08-07          < from: TTE Echo Complete w/o Contrast w/ Doppler (08.07.23 @ 08:20) >  Summary:   1. Technically difficult study.   2. Endocardial visualization was enhanced with intravenous echo contrast.   3. Left ventricular ejection fraction, by visual estimation, is >55%.   4. Normal global left ventricular systolic function.   5. The left ventricular diastolic function could not be assessed in this   study.   6. Left atrial enlargement.   7. Mild dilatation of the ascending aorta.        ECG:  AF w RVR     TELEMETRY EVENTS:  AF w RVR up to 120s

## 2023-08-08 NOTE — PROGRESS NOTE ADULT - SUBJECTIVE AND OBJECTIVE BOX
EMILIE CORONA  77y  Male      Patient is a 77y old  Male who presents with chest pain/heaviness and shortness of breath       INTERVAL HPI/OVERNIGHT EVENTS:  Patient seen and examined earlier this morning  sitting in bed  in nad  states he feels much better today  I&O's are not being accurately measured   denies sob or chest pain     REVIEW OF SYSTEMS:  CONSTITUTIONAL: No fever, weight loss, or fatigue  EYES: No eye pain, visual disturbances, or discharge  ENMT:  No difficulty hearing, tinnitus, vertigo; No sinus or throat pain  NECK: No pain or stiffness  RESPIRATORY: No cough, wheezing, chills or hemoptysis; No shortness of breath  CARDIOVASCULAR: No chest pain, palpitations, dizziness, or leg swelling  GASTROINTESTINAL: No abdominal or epigastric pain. No nausea, vomiting, or hematemesis; No diarrhea or constipation. No melena or hematochezia.  GENITOURINARY: No dysuria, frequency, hematuria, or incontinence  NEUROLOGICAL: No headaches, memory loss, loss of strength, numbness, or tremors  SKIN: No itching, burning, rashes, or lesions   LYMPH NODES: No enlarged glands  ENDOCRINE: No heat or cold intolerance; No hair loss  MUSCULOSKELETAL: No joint pain or swelling; No muscle, back, or extremity pain  PSYCHIATRIC: No depression, anxiety, mood swings, or difficulty sleeping  HEME/LYMPH: No easy bruising, or bleeding gums  ALLERY AND IMMUNOLOGIC: No hives or eczema    T(C): 36.1 (08-08-23 @ 08:05), Max: 36.7 (08-08-23 @ 00:30)  HR: 86 (08-08-23 @ 13:33) (82 - 96)  BP: 130/69 (08-08-23 @ 13:33) (130/69 - 169/77)  RR: 18 (08-08-23 @ 08:05) (18 - 18)  SpO2: 97% (08-08-23 @ 08:05) (97% - 97%)    PHYSICAL EXAM:  GENERAL: NAD, well-groomed,   HEAD:  Atraumatic, Normocephalic  EYES: conjunctiva and sclera clear  ENMT: Moist mucous membranes,  No visible lesions  NECK: Supple, No JVD, Normal thyroid  NERVOUS SYSTEM:  Alert & Oriented X3, Good concentration; moves all extremities   CHEST/LUNG: good air entry   HEART: Regular rate and rhythm; No murmurs, rubs, or gallops  ABDOMEN: Soft, Nontender, Nondistended; Bowel sounds present  EXTREMITIES:  2+ Peripheral Pulses, No clubbing, cyanosis, or edema  LYMPH: No lymphadenopathy noted  SKIN: No rashes or lesions    Consultant(s) Notes Reviewed:  [x ] YES  [ ] NO  Care Discussed with Consultants/Other Providers [ x] YES  [ ] NO    LAB:                        12.2   7.28  )-----------( 147      ( 07 Aug 2023 06:56 )             37.6       08-08    141  |  102  |  16  ----------------------------<  149<H>  3.7   |  25  |  0.9    Ca    9.1      08 Aug 2023 05:37  Mg     1.8     08-08    TPro  6.9  /  Alb  4.5  /  TBili  0.8  /  DBili  x   /  AST  20  /  ALT  22  /  AlkPhos  53  08-07    LIVER FUNCTIONS - ( 07 Aug 2023 06:56 )  Alb: 4.5 g/dL / Pro: 6.9 g/dL / ALK PHOS: 53 U/L / ALT: 22 U/L / AST: 20 U/L / GGT: x           CARDIAC MARKERS ( 07 Aug 2023 06:56 )  x     / 0.02 ng/mL / x     / x     / x      CARDIAC MARKERS ( 07 Aug 2023 01:15 )  x     / 0.02 ng/mL / x     / x     / x          Drug Dosing Weight  Height (cm): 180.3 (06 Aug 2023 12:08)  Weight (kg): 129.3 (06 Aug 2023 12:08)  BMI (kg/m2): 39.8 (06 Aug 2023 12:08)  BSA (m2): 2.45 (06 Aug 2023 12:08)        CAPILLARY BLOOD GLUCOSE  POCT Blood Glucose.: 204 mg/dL (08 Aug 2023 11:05)  POCT Blood Glucose.: 188 mg/dL (08 Aug 2023 07:29)  POCT Blood Glucose.: 159 mg/dL (07 Aug 2023 20:49)  POCT Blood Glucose.: 188 mg/dL (07 Aug 2023 16:22)      I&O's Summary    07 Aug 2023 07:01  -  08 Aug 2023 07:00  --------------------------------------------------------  IN: 700 mL / OUT: 1000 mL / NET: -300 mL      Urinalysis Basic - ( 08 Aug 2023 05:37 )    Color: x / Appearance: x / SG: x / pH: x  Gluc: 149 mg/dL / Ketone: x  / Bili: x / Urobili: x   Blood: x / Protein: x / Nitrite: x   Leuk Esterase: x / RBC: x / WBC x   Sq Epi: x / Non Sq Epi: x / Bacteria: x        RADIOLOGY & ADDITIONAL TESTS:  Imaging Personally Reviewed:  [x] YES  [ ] NO  < from: TTE Echo Complete w/o Contrast w/ Doppler (08.07.23 @ 08:20) >   1. Technically difficult study.   2. Endocardial visualization was enhanced with intravenous echo contrast.   3. Left ventricular ejection fraction, by visual estimation, is >55%.   4. Normal global left ventricular systolic function.   5. The left ventricular diastolic function could not be assessed in this   study.   6. Left atrial enlargement.   7. Mild dilatation of the ascending aorta.    < end of copied text >      MEDS:  aspirin  chewable 81 milliGRAM(s) Oral daily  atorvastatin 80 milliGRAM(s) Oral at bedtime  dextrose 5%. 1000 milliLiter(s) IV Continuous <Continuous>  dextrose 5%. 1000 milliLiter(s) IV Continuous <Continuous>  dextrose 50% Injectable 25 Gram(s) IV Push once  dextrose 50% Injectable 12.5 Gram(s) IV Push once  dextrose 50% Injectable 25 Gram(s) IV Push once  dextrose Oral Gel 15 Gram(s) Oral once PRN  dicyclomine 20 milliGRAM(s) Oral three times a day before meals  enoxaparin Injectable 130 milliGRAM(s) SubCutaneous every 12 hours  famotidine    Tablet 40 milliGRAM(s) Oral daily  fenofibrate Tablet 145 milliGRAM(s) Oral daily  finasteride 5 milliGRAM(s) Oral daily  furosemide   Injectable 40 milliGRAM(s) IV Push two times a day  glucagon  Injectable 1 milliGRAM(s) IntraMuscular once  insulin lispro (ADMELOG) corrective regimen sliding scale   SubCutaneous three times a day before meals  levothyroxine 150 MICROGram(s) Oral daily  melatonin 3 milliGRAM(s) Oral at bedtime PRN  metoprolol tartrate 50 milliGRAM(s) Oral two times a day  pantoprazole    Tablet 40 milliGRAM(s) Oral before breakfast  tamsulosin 0.4 milliGRAM(s) Oral at bedtime  valsartan 80 milliGRAM(s) Oral daily

## 2023-08-08 NOTE — PROGRESS NOTE ADULT - SUBJECTIVE AND OBJECTIVE BOX
SUBJECTIVE:    Patient is a 77y old Male who presents with a chief complaint of CP.    Today is hospital day 2d. NAEON. Patient reported that the chest pressure that he had on admission subsided, patient satting well on RA now, no other complaints.    PAST MEDICAL & SURGICAL HISTORY  Hypertension complications    GERD (gastroesophageal reflux disease)    ETELVINA (obstructive sleep apnea)  NOT USING cpap    CAD (coronary artery disease)  STENTS 2000 AND 2002 (4 stents)    DVT (deep venous thrombosis)  LEFT LE- 2008 AND 2018 AFTER INJURY    Seizure  last episode 2 yrs ago-REACTION TO CIPRO    High cholesterol    Hypothyroid    Pain    Back pain  chronic    Esophagitis    BPH (benign prostatic hyperplasia)    Myocardial infarction  2000    Afib  Sep 2019; on Xarelto    DM (diabetes mellitus)    HTN (hypertension)    Obesity    Sleep apnea  Dx 10 yrs ago; denies CPAP use    Dry eyes    H/O hernia repair  RT INGUINAL -2 AND LT INGUINAL -1    H/O arthroscopy of knee  RT -2 AND LT -1    CAD (coronary artery disease)  stents (4)    History of parathyroid surgery    H/O arthroscopy of shoulder  RT ROTATOR CUFF 2019    S/P cataract surgery  rt and lt    Kidney stone  RT ESWL OCT 2019      SOCIAL HISTORY:  Negative for smoking/alcohol/drug use.     ALLERGIES:  Cipro (Other)    MEDICATIONS:  STANDING MEDICATIONS  aspirin  chewable 81 milliGRAM(s) Oral daily  atorvastatin 80 milliGRAM(s) Oral at bedtime  dextrose 5%. 1000 milliLiter(s) IV Continuous <Continuous>  dextrose 5%. 1000 milliLiter(s) IV Continuous <Continuous>  dextrose 50% Injectable 25 Gram(s) IV Push once  dextrose 50% Injectable 12.5 Gram(s) IV Push once  dextrose 50% Injectable 25 Gram(s) IV Push once  dicyclomine 20 milliGRAM(s) Oral three times a day before meals  enoxaparin Injectable 130 milliGRAM(s) SubCutaneous every 12 hours  famotidine    Tablet 40 milliGRAM(s) Oral daily  fenofibrate Tablet 145 milliGRAM(s) Oral daily  finasteride 5 milliGRAM(s) Oral daily  furosemide   Injectable 40 milliGRAM(s) IV Push two times a day  glucagon  Injectable 1 milliGRAM(s) IntraMuscular once  insulin lispro (ADMELOG) corrective regimen sliding scale   SubCutaneous three times a day before meals  levothyroxine 150 MICROGram(s) Oral daily  metoprolol tartrate 50 milliGRAM(s) Oral two times a day  pantoprazole    Tablet 40 milliGRAM(s) Oral before breakfast  tamsulosin 0.4 milliGRAM(s) Oral at bedtime  valsartan 80 milliGRAM(s) Oral daily    PRN MEDICATIONS  dextrose Oral Gel 15 Gram(s) Oral once PRN  melatonin 3 milliGRAM(s) Oral at bedtime PRN    VITALS:   T(F): 97  HR: 87  BP: 133/73  RR: 18  SpO2: 97%    LABS:                        12.2   7.28  )-----------( 147      ( 07 Aug 2023 06:56 )             37.6     08-08    141  |  102  |  16  ----------------------------<  149<H>  3.7   |  25  |  0.9    Ca    9.1      08 Aug 2023 05:37  Mg     1.8     08-08    TPro  6.9  /  Alb  4.5  /  TBili  0.8  /  DBili  x   /  AST  20  /  ALT  22  /  AlkPhos  53  08-07      Urinalysis Basic - ( 08 Aug 2023 05:37 )    Color: x / Appearance: x / SG: x / pH: x  Gluc: 149 mg/dL / Ketone: x  / Bili: x / Urobili: x   Blood: x / Protein: x / Nitrite: x   Leuk Esterase: x / RBC: x / WBC x   Sq Epi: x / Non Sq Epi: x / Bacteria: x            CARDIAC MARKERS ( 07 Aug 2023 06:56 )  x     / 0.02 ng/mL / x     / x     / x      CARDIAC MARKERS ( 07 Aug 2023 01:15 )  x     / 0.02 ng/mL / x     / x     / x      CARDIAC MARKERS ( 06 Aug 2023 13:30 )  x     / 0.02 ng/mL / x     / x     / x          RADIOLOGY:    PHYSICAL EXAM:  GEN: No acute distress  LUNGS: Clear to auscultation bilaterally   HEART: Regular  ABD: Soft, non-tender, non-distended.  EXT: NC/NC/NE/2+PP/RICKETTS/Skin Intact.   NEURO: AAOX3   SUBJECTIVE:    Patient is a 77y old Male who presents with a chief complaint of CP.    Today is hospital day 2d. NAEON. Patient reported that the chest pressure that he had on admission subsided, patient satting well on RA now, no other complaints.    PAST MEDICAL & SURGICAL HISTORY  Hypertension complications    GERD (gastroesophageal reflux disease)    ETELVINA (obstructive sleep apnea)  NOT USING cpap    CAD (coronary artery disease)  STENTS 2000 AND 2002 (4 stents)    DVT (deep venous thrombosis)  LEFT LE- 2008 AND 2018 AFTER INJURY    Seizure  last episode 2 yrs ago-REACTION TO CIPRO    High cholesterol    Hypothyroid    Pain    Back pain  chronic    Esophagitis    BPH (benign prostatic hyperplasia)    Myocardial infarction  2000    Afib  Sep 2019; on Xarelto    DM (diabetes mellitus)    HTN (hypertension)    Obesity    Sleep apnea  Dx 10 yrs ago; denies CPAP use    Dry eyes    H/O hernia repair  RT INGUINAL -2 AND LT INGUINAL -1    H/O arthroscopy of knee  RT -2 AND LT -1    CAD (coronary artery disease)  stents (4)    History of parathyroid surgery    H/O arthroscopy of shoulder  RT ROTATOR CUFF 2019    S/P cataract surgery  rt and lt    Kidney stone  RT ESWL OCT 2019      SOCIAL HISTORY:  Negative for smoking/alcohol/drug use.     ALLERGIES:  Cipro (Other)    MEDICATIONS:  STANDING MEDICATIONS  aspirin  chewable 81 milliGRAM(s) Oral daily  atorvastatin 80 milliGRAM(s) Oral at bedtime  dextrose 5%. 1000 milliLiter(s) IV Continuous <Continuous>  dextrose 5%. 1000 milliLiter(s) IV Continuous <Continuous>  dextrose 50% Injectable 25 Gram(s) IV Push once  dextrose 50% Injectable 12.5 Gram(s) IV Push once  dextrose 50% Injectable 25 Gram(s) IV Push once  dicyclomine 20 milliGRAM(s) Oral three times a day before meals  enoxaparin Injectable 130 milliGRAM(s) SubCutaneous every 12 hours  famotidine    Tablet 40 milliGRAM(s) Oral daily  fenofibrate Tablet 145 milliGRAM(s) Oral daily  finasteride 5 milliGRAM(s) Oral daily  furosemide   Injectable 40 milliGRAM(s) IV Push two times a day  glucagon  Injectable 1 milliGRAM(s) IntraMuscular once  insulin lispro (ADMELOG) corrective regimen sliding scale   SubCutaneous three times a day before meals  levothyroxine 150 MICROGram(s) Oral daily  metoprolol tartrate 50 milliGRAM(s) Oral two times a day  pantoprazole    Tablet 40 milliGRAM(s) Oral before breakfast  tamsulosin 0.4 milliGRAM(s) Oral at bedtime  valsartan 80 milliGRAM(s) Oral daily    PRN MEDICATIONS  dextrose Oral Gel 15 Gram(s) Oral once PRN  melatonin 3 milliGRAM(s) Oral at bedtime PRN    VITALS:   T(F): 97  HR: 87  BP: 133/73  RR: 18  SpO2: 97%    LABS:                        12.2   7.28  )-----------( 147      ( 07 Aug 2023 06:56 )             37.6     08-08    141  |  102  |  16  ----------------------------<  149<H>  3.7   |  25  |  0.9    Ca    9.1      08 Aug 2023 05:37  Mg     1.8     08-08    TPro  6.9  /  Alb  4.5  /  TBili  0.8  /  DBili  x   /  AST  20  /  ALT  22  /  AlkPhos  53  08-07      Urinalysis Basic - ( 08 Aug 2023 05:37 )    Color: x / Appearance: x / SG: x / pH: x  Gluc: 149 mg/dL / Ketone: x  / Bili: x / Urobili: x   Blood: x / Protein: x / Nitrite: x   Leuk Esterase: x / RBC: x / WBC x   Sq Epi: x / Non Sq Epi: x / Bacteria: x      CARDIAC MARKERS ( 07 Aug 2023 06:56 )  x     / 0.02 ng/mL / x     / x     / x      CARDIAC MARKERS ( 07 Aug 2023 01:15 )  x     / 0.02 ng/mL / x     / x     / x      CARDIAC MARKERS ( 06 Aug 2023 13:30 )  x     / 0.02 ng/mL / x     / x     / x          RADIOLOGY:    PHYSICAL EXAM:  GEN: No acute distress  LUNGS: Clear to auscultation bilaterally   HEART: Regular  ABD: Soft, non-tender, non-distended.  EXT: NC/NC/NE/2+PP/RICKETTS/Skin Intact.   NEURO: AAOX3

## 2023-08-08 NOTE — PROGRESS NOTE ADULT - ASSESSMENT
77-year-old male with a pmhx of HTN, HLD, CAD s/p 5 PCIs, DM, Afib s/p DCCV on xarelto presented to the ED from urgent care complaining of chest pain and dyspnea on exertion. Associated with one week of BL LE edema, sweating, and inability to sleep. Patient recently started on HRT 4 weeks ago.       #Chest pain and dyspnea, 2/2 HF exacerbation  #h/o Afib with RVR s/p DCCV  #h/o CAD s/p 5 PCIs  - Last cath was in 2019 (dr Borrego) showed significant 2 vessel disease in the proximal LCx (100% occluded) and proximal RCA (90% occluded). had PCI to proximal RCA   - Had DCCV in 2020 successfully to NSR   - Loaded with asa 324 in urgent care. s/p 1 IV lasix 40   - on admission BNP 1655, trops 0.02-->0.02-->0.02  - CTA on admission: did not show a PE, showed BL GGO and septal thickening compatible with pulmonary edema. Dilated thoracic aortameasuring 4.4 cm, unchanged. Main pulmonary artery measures 3.6 cm, unchanged, which may be suggestive of pulmonary hypertension.  - EKG on admission showed T-wave inversion in lead III. Afib   - No recent echo but on PHILIP EF was NL   - patient was on xarelto, switched to lovenox on admission  - cardiology consulted, rec pending  - TTE: unremarkable  - f/u duplex  - c/w remote tele  - cw IV lasix 40 BID (patient home on 40 qd)   - monitor BMP daily    #HRT therapy  - hold off now - sweating and insomnia most likely 2/2 to HRT  - on testosterone cypionate injection every 2 wks since 4 wks ago - received 2 doses  - follows Dr. Sheldon outpatient    #Hypomagnesemia  - daily bmp  - replete    #HTN   - cw with ASA, metoprolol    #HLD   - cw home meds     #DM   - monitor FS  - start insulin basal-bolus if >180 persistently     #BPH   - cw home meds      MIsc;  DVT ppx: lovenox   GI ppx: protonix and pepcid   activity; AAT  Diet; Dash/CC- fluid restriction    77-year-old male with a pmhx of HTN, HLD, CAD s/p 5 PCIs, DM, Afib s/p DCCV on xarelto presented to the ED from urgent care complaining of chest pain and dyspnea on exertion. Associated with one week of BL LE edema, sweating, and inability to sleep. Patient recently started on HRT 4 weeks ago.       #Chest pain and dyspnea, 2/2 HF exacerbation  #h/o Afib with RVR s/p DCCV  #h/o CAD s/p 5 PCIs  - Last cath was in 2019 (dr Borrego) showed significant 2 vessel disease in the proximal LCx (100% occluded) and proximal RCA (90% occluded). had PCI to proximal RCA   - Had DCCV in 2020 successfully to NSR   - Loaded with asa 324 in urgent care. s/p 1 IV lasix 40   - on admission BNP 1655, trops 0.02-->0.02-->0.02  - CTA on admission: did not show a PE, showed BL GGO and septal thickening compatible with pulmonary edema. Dilated thoracic aortameasuring 4.4 cm, unchanged. Main pulmonary artery measures 3.6 cm, unchanged, which may be suggestive of pulmonary hypertension.  - EKG on admission showed T-wave inversion in lead III. Afib   - No recent echo but on PHILIP EF was NL   - patient was on xarelto, switched to lovenox on admission  - TTE: unremarkable  - f/u duplex  - c/w remote tele  - cw IV lasix 40 BID (patient home on 40 qd)   - monitor BMP daily  - patient follows outpatient private MD Dr. Steele at [phone], called and put in a consult, said would call me back, pending answers    #HRT therapy  - hold off now - sweating and insomnia most likely 2/2 to HRT  - was on testosterone cypionate injection every 2 wks since 4 wks ago - received 2 doses  - follows Dr. Sheldon outpatient    #Hypomagnesemia  - daily bmp  - replete    #HTN   - cw with ASA, metoprolol    #HLD   - cw home meds     #DM   - monitor FS  - start insulin basal-bolus if >180 persistently     #BPH   - cw home meds      MIsc;  DVT ppx: lovenox   GI ppx: protonix and pepcid   activity; AAT  Diet; Dash/CC- fluid restriction    77-year-old male with a pmhx of HTN, HLD, CAD s/p 5 PCIs, DM, Afib s/p DCCV on xarelto presented to the ED from urgent care complaining of chest pain and dyspnea on exertion. Associated with one week of BL LE edema, sweating, and inability to sleep. Patient recently started on HRT 4 weeks ago.       #Chest pain and dyspnea, 2/2 HF exacerbation  #h/o Afib with RVR s/p DCCV  #h/o CAD s/p 5 PCIs  - Last cath was in 2019 (dr Borrego) showed significant 2 vessel disease in the proximal LCx (100% occluded) and proximal RCA (90% occluded). had PCI to proximal RCA   - Had DCCV in 2020 successfully to NSR   - Loaded with asa 324 in urgent care. s/p 1 IV lasix 40   - on admission BNP 1655, trops 0.02-->0.02-->0.02  - CTA on admission: did not show a PE, showed BL GGO and septal thickening compatible with pulmonary edema. Dilated thoracic aortameasuring 4.4 cm, unchanged. Main pulmonary artery measures 3.6 cm, unchanged, which may be suggestive of pulmonary hypertension.  - EKG on admission showed T-wave inversion in lead III. Afib   - No recent echo but on PHILIP EF was NL   - patient was on xarelto, switched to lovenox on admission  - TTE: unremarkable  - f/u duplex  - c/w remote tele  - cw IV lasix 40 BID (patient home on 40 qd)   - monitor BMP daily  - patient follows outpatient private MD Dr. Steele at [phone], called and put in a consult, said would call me back, pending answers  - hospital cardiology following patient    #HRT therapy  - hold off now - sweating and insomnia most likely 2/2 to HRT  - was on testosterone cypionate injection every 2 wks since 4 wks ago - received 2 doses  - follows Dr. Sheldon outpatient    #Hypomagnesemia  - daily bmp  - replete    #HTN   - cw with ASA, metoprolol    #HLD   - cw home meds     #DM   - monitor FS  - start insulin basal-bolus if >180 persistently     #h/o GERD  - at home on pepcid and omeprazole  - started on pepcid and protonix    #BPH   - cw home meds      MIsc;  DVT ppx: lovenox   GI ppx: protonix and pepcid   activity; AAT  Diet; Dash/CC- fluid restriction

## 2023-08-08 NOTE — CONSULT NOTE ADULT - ASSESSMENT
78 yo M w HTN, CAD s/p PCI ( 2019-significant double vessel coronary artery disease (RCA and LCX) s/p successful PCI of proximal RCA), AF s/p DCCV pw c/o not feeling well, SOB, diaphoresis.     Fluid overload/ HFpEF   AF w RVR   H/o CAD s/p PCI (LCX and RCA )   HTN     -pt will benefit from restoration of sinus rhythm, will schedule him for PHILIP/DCCV tomorrow   -keep NPO past midnight  -cont AC-lovenox   -cont metoprolol 50 mg BID  -cont diuresis with IV lasix 40 mg atleast till the DCCV , can switch to PO lasix after   -EP f/u outpatient for possible ablation   -cont aspirin, atorvastatin, valsartan      78 yo M w HTN, CAD s/p PCI, AF s/p DCCV c/o not feeling well, SOB, diaphoresis.     Acute on Chronic HFpEF - improving  AF with RVR   CAD s/p PCI (LCX and RCA)  HTN       Recommend:  -pt will benefit from restoration of sinus rhythm, will schedule him for PHILIP/DCCV tomorrow   -keep NPO past midnight  -cont AC-lovenox   -cont metoprolol 50 mg BID  -cont diuresis with IV lasix 40 mg at least till the DCCV , can switch to PO lasix after   -EP f/u outpatient for possible ablation   -cont aspirin, atorvastatin, valsartan

## 2023-08-08 NOTE — PROGRESS NOTE ADULT - ASSESSMENT
77-year-old male with a pmhx of HTN, HLD, CAD s/p 5 PCIs, DM, Afib s/p DCCV on xarelto presented to the ED from urgent care complaining of chest pain and dyspnea on exertion. Associated with one week of BL LE edema, sweating, and inability to sleep. Patient recently started on HRT 4 weeks ago.     #Chest pain - resolved   #fluid overload   #h/o Paroxysmal Afib with RVR s/p DCCV  #h/o CAD s/p 5 PCIs - last one was 3 yeas ago   #HTN/DL  - Last cath was in 2019 (dr Borrego) showed significant 2 vessel disease in the proximal LCx (100% occluded) and proximal RCA (90% occluded). had PCI to proximal RCA   - Had DCCV in 2020 successfully to NSR   - placed on remote tele on 8/7 - no events  - cardio cs pending - patient follows outpatient private MD Dr. Steele at [phone]  - echo reviewed - EF > 55%  - diuresing with IV lasix 40mg q12 - switch to oral in am  - strict I&O's - not accurate - discussed with nursing   - daily weights  - fluid restricted diet  - patient was on xarelto, switched to lovenox on admission  - on asa/statin/fenofibrate    #HRT therapy - patient was on testosterone injections - will stop now  - follows outpatient with Dr. Sheldon     #Suspected magnesium deficiency - resolved   - supplemented on 8/7    #DM II with hyperglycemia  - add Lantus and lispro to regimen  - continue sliding scale as well  - check fs qac and qhs  - follow up A1c  - CHO consistent diet   - diabetic teaching/education     #BPH   - on finasteride and flomax    #obesity  -diet and lifestyle modification    #Hypothyroid  -check TSH   -on synthroid     Progress Note Handoff  Pending Consults: cardio  Pending Tests: bmp, tsh  Pending Results: bmp, tsh, a1c  Family Discussion: Discussed labs, meds, ambulation, measuring intake and output, cardiology and d/c in 48hrs with pt and medical staff. All questions answered. PFD placed for 48hrs   Disposition: Home___x__/SNF______/Other_____/Unknown at this time_____  Spent over 55 min reviewing chart, speaking with patient/family and on coordinating patient care during interdisciplinary rounds     Please call me with any questions at extension [phone]

## 2023-08-09 LAB
ANION GAP SERPL CALC-SCNC: 13 MMOL/L — SIGNIFICANT CHANGE UP (ref 7–14)
BUN SERPL-MCNC: 17 MG/DL — SIGNIFICANT CHANGE UP (ref 10–20)
CALCIUM SERPL-MCNC: 9.1 MG/DL — SIGNIFICANT CHANGE UP (ref 8.4–10.5)
CHLORIDE SERPL-SCNC: 102 MMOL/L — SIGNIFICANT CHANGE UP (ref 98–110)
CO2 SERPL-SCNC: 25 MMOL/L — SIGNIFICANT CHANGE UP (ref 17–32)
CREAT SERPL-MCNC: 1 MG/DL — SIGNIFICANT CHANGE UP (ref 0.7–1.5)
EGFR: 78 ML/MIN/1.73M2 — SIGNIFICANT CHANGE UP
GLUCOSE BLDC GLUCOMTR-MCNC: 156 MG/DL — HIGH (ref 70–99)
GLUCOSE BLDC GLUCOMTR-MCNC: 157 MG/DL — HIGH (ref 70–99)
GLUCOSE BLDC GLUCOMTR-MCNC: 212 MG/DL — HIGH (ref 70–99)
GLUCOSE SERPL-MCNC: 157 MG/DL — HIGH (ref 70–99)
MAGNESIUM SERPL-MCNC: 1.8 MG/DL — SIGNIFICANT CHANGE UP (ref 1.8–2.4)
POTASSIUM SERPL-MCNC: 4 MMOL/L — SIGNIFICANT CHANGE UP (ref 3.5–5)
POTASSIUM SERPL-SCNC: 4 MMOL/L — SIGNIFICANT CHANGE UP (ref 3.5–5)
SODIUM SERPL-SCNC: 140 MMOL/L — SIGNIFICANT CHANGE UP (ref 135–146)
TSH SERPL-MCNC: 1.45 UIU/ML — SIGNIFICANT CHANGE UP (ref 0.27–4.2)

## 2023-08-09 PROCEDURE — 92960 CARDIOVERSION ELECTRIC EXT: CPT

## 2023-08-09 PROCEDURE — 93320 DOPPLER ECHO COMPLETE: CPT | Mod: 26

## 2023-08-09 PROCEDURE — 99232 SBSQ HOSP IP/OBS MODERATE 35: CPT

## 2023-08-09 PROCEDURE — 93325 DOPPLER ECHO COLOR FLOW MAPG: CPT | Mod: 26

## 2023-08-09 PROCEDURE — 93010 ELECTROCARDIOGRAM REPORT: CPT | Mod: 77

## 2023-08-09 PROCEDURE — 93312 ECHO TRANSESOPHAGEAL: CPT | Mod: 26,XU

## 2023-08-09 PROCEDURE — 93010 ELECTROCARDIOGRAM REPORT: CPT

## 2023-08-09 PROCEDURE — 99222 1ST HOSP IP/OBS MODERATE 55: CPT | Mod: GC

## 2023-08-09 RX ORDER — RIVAROXABAN 15 MG-20MG
20 KIT ORAL
Refills: 0 | Status: DISCONTINUED | OUTPATIENT
Start: 2023-08-09 | End: 2023-08-10

## 2023-08-09 RX ORDER — MAGNESIUM SULFATE 500 MG/ML
2 VIAL (ML) INJECTION ONCE
Refills: 0 | Status: COMPLETED | OUTPATIENT
Start: 2023-08-09 | End: 2023-08-09

## 2023-08-09 RX ADMIN — Medication 20 MILLIGRAM(S): at 06:11

## 2023-08-09 RX ADMIN — Medication 2: at 07:56

## 2023-08-09 RX ADMIN — FAMOTIDINE 40 MILLIGRAM(S): 10 INJECTION INTRAVENOUS at 13:44

## 2023-08-09 RX ADMIN — PANTOPRAZOLE SODIUM 40 MILLIGRAM(S): 20 TABLET, DELAYED RELEASE ORAL at 06:11

## 2023-08-09 RX ADMIN — FINASTERIDE 5 MILLIGRAM(S): 5 TABLET, FILM COATED ORAL at 13:43

## 2023-08-09 RX ADMIN — Medication 4: at 16:57

## 2023-08-09 RX ADMIN — Medication 20 MILLIGRAM(S): at 13:42

## 2023-08-09 RX ADMIN — Medication 150 MICROGRAM(S): at 06:10

## 2023-08-09 RX ADMIN — Medication 40 MILLIGRAM(S): at 13:43

## 2023-08-09 RX ADMIN — Medication 20 MILLIGRAM(S): at 17:11

## 2023-08-09 RX ADMIN — Medication 25 GRAM(S): at 16:57

## 2023-08-09 RX ADMIN — ATORVASTATIN CALCIUM 80 MILLIGRAM(S): 80 TABLET, FILM COATED ORAL at 21:30

## 2023-08-09 RX ADMIN — Medication 50 MILLIGRAM(S): at 06:12

## 2023-08-09 RX ADMIN — Medication 81 MILLIGRAM(S): at 13:43

## 2023-08-09 RX ADMIN — RIVAROXABAN 20 MILLIGRAM(S): KIT at 17:40

## 2023-08-09 RX ADMIN — TAMSULOSIN HYDROCHLORIDE 0.4 MILLIGRAM(S): 0.4 CAPSULE ORAL at 21:31

## 2023-08-09 RX ADMIN — VALSARTAN 80 MILLIGRAM(S): 80 TABLET ORAL at 06:10

## 2023-08-09 RX ADMIN — ENOXAPARIN SODIUM 130 MILLIGRAM(S): 100 INJECTION SUBCUTANEOUS at 06:11

## 2023-08-09 RX ADMIN — Medication 40 MILLIGRAM(S): at 06:10

## 2023-08-09 RX ADMIN — Medication 145 MILLIGRAM(S): at 13:42

## 2023-08-09 RX ADMIN — Medication 50 MILLIGRAM(S): at 17:11

## 2023-08-09 NOTE — PROGRESS NOTE ADULT - SUBJECTIVE AND OBJECTIVE BOX
SUBJECTIVE:    Patient is a 77y old Male who presents with a chief complaint of CHF (08 Aug 2023 16:20)     Today is hospital day 3dMira LARA.     PAST MEDICAL & SURGICAL HISTORY  Hypertension complications    GERD (gastroesophageal reflux disease)    ETELVINA (obstructive sleep apnea)  NOT USING cpap    CAD (coronary artery disease)  STENTS 2000 AND 2002 (4 stents)    DVT (deep venous thrombosis)  LEFT LE- 2008 AND 2018 AFTER INJURY    Seizure  last episode 2 yrs ago-REACTION TO CIPRO    High cholesterol    Hypothyroid    Pain    Back pain  chronic    Esophagitis    BPH (benign prostatic hyperplasia)    Myocardial infarction  2000    Afib  Sep 2019; on Xarelto    DM (diabetes mellitus)    HTN (hypertension)    Obesity    Sleep apnea  Dx 10 yrs ago; denies CPAP use    Dry eyes    H/O hernia repair  RT INGUINAL -2 AND LT INGUINAL -1    H/O arthroscopy of knee  RT -2 AND LT -1    CAD (coronary artery disease)  stents (4)    History of parathyroid surgery    H/O arthroscopy of shoulder  RT ROTATOR CUFF 2019    S/P cataract surgery  rt and lt    Kidney stone  RT ESWL OCT 2019      SOCIAL HISTORY:  Negative for smoking/alcohol/drug use.     ALLERGIES:  Cipro (Other)    MEDICATIONS:  STANDING MEDICATIONS  aspirin  chewable 81 milliGRAM(s) Oral daily  atorvastatin 80 milliGRAM(s) Oral at bedtime  dextrose 5%. 1000 milliLiter(s) IV Continuous <Continuous>  dextrose 5%. 1000 milliLiter(s) IV Continuous <Continuous>  dextrose 50% Injectable 25 Gram(s) IV Push once  dextrose 50% Injectable 12.5 Gram(s) IV Push once  dextrose 50% Injectable 25 Gram(s) IV Push once  dicyclomine 20 milliGRAM(s) Oral three times a day before meals  enoxaparin Injectable 130 milliGRAM(s) SubCutaneous every 12 hours  famotidine    Tablet 40 milliGRAM(s) Oral daily  fenofibrate Tablet 145 milliGRAM(s) Oral daily  finasteride 5 milliGRAM(s) Oral daily  glucagon  Injectable 1 milliGRAM(s) IntraMuscular once  insulin lispro (ADMELOG) corrective regimen sliding scale   SubCutaneous three times a day before meals  levothyroxine 150 MICROGram(s) Oral daily  metoprolol tartrate 50 milliGRAM(s) Oral two times a day  pantoprazole    Tablet 40 milliGRAM(s) Oral before breakfast  tamsulosin 0.4 milliGRAM(s) Oral at bedtime  valsartan 80 milliGRAM(s) Oral daily    PRN MEDICATIONS  acetaminophen     Tablet .. 650 milliGRAM(s) Oral every 6 hours PRN  dextrose Oral Gel 15 Gram(s) Oral once PRN  melatonin 3 milliGRAM(s) Oral at bedtime PRN    VITALS:   T(F): 97.3  HR: 86  BP: 140/69  RR: 18  SpO2: 95%    LABS:    08-09    140  |  102  |  17  ----------------------------<  157<H>  4.0   |  25  |  1.0    Ca    9.1      09 Aug 2023 08:29  Mg     1.8     08-09        Urinalysis Basic - ( 09 Aug 2023 08:29 )    Color: x / Appearance: x / SG: x / pH: x  Gluc: 157 mg/dL / Ketone: x  / Bili: x / Urobili: x   Blood: x / Protein: x / Nitrite: x   Leuk Esterase: x / RBC: x / WBC x   Sq Epi: x / Non Sq Epi: x / Bacteria: x      RADIOLOGY:    PHYSICAL EXAM:  GEN: No acute distress  LUNGS: Clear to auscultation bilaterally   HEART: Regular  ABD: Soft, non-tender, non-distended.  EXT: NC/NC/NE/2+PP/RICKETTS/Skin Intact.   NEURO: AAOX3   SUBJECTIVE:    Patient is a 77y old Male who presents with a chief complaint of CHF (08 Aug 2023 16:20)     Today is hospital day 3d. NAEON. Patient denied CP, SOB, pain, nausea, sweating or headache. Patient on NPO for PHILIP today.    PAST MEDICAL & SURGICAL HISTORY  Hypertension complications    GERD (gastroesophageal reflux disease)    ETELVINA (obstructive sleep apnea)  NOT USING cpap    CAD (coronary artery disease)  STENTS 2000 AND 2002 (4 stents)    DVT (deep venous thrombosis)  LEFT LE- 2008 AND 2018 AFTER INJURY    Seizure  last episode 2 yrs ago-REACTION TO CIPRO    High cholesterol    Hypothyroid    Pain    Back pain  chronic    Esophagitis    BPH (benign prostatic hyperplasia)    Myocardial infarction  2000    Afib  Sep 2019; on Xarelto    DM (diabetes mellitus)    HTN (hypertension)    Obesity    Sleep apnea  Dx 10 yrs ago; denies CPAP use    Dry eyes    H/O hernia repair  RT INGUINAL -2 AND LT INGUINAL -1    H/O arthroscopy of knee  RT -2 AND LT -1    CAD (coronary artery disease)  stents (4)    History of parathyroid surgery    H/O arthroscopy of shoulder  RT ROTATOR CUFF 2019    S/P cataract surgery  rt and lt    Kidney stone  RT ESWL OCT 2019      SOCIAL HISTORY:  Negative for smoking/alcohol/drug use.     ALLERGIES:  Cipro (Other)    MEDICATIONS:  STANDING MEDICATIONS  aspirin  chewable 81 milliGRAM(s) Oral daily  atorvastatin 80 milliGRAM(s) Oral at bedtime  dextrose 5%. 1000 milliLiter(s) IV Continuous <Continuous>  dextrose 5%. 1000 milliLiter(s) IV Continuous <Continuous>  dextrose 50% Injectable 25 Gram(s) IV Push once  dextrose 50% Injectable 12.5 Gram(s) IV Push once  dextrose 50% Injectable 25 Gram(s) IV Push once  dicyclomine 20 milliGRAM(s) Oral three times a day before meals  enoxaparin Injectable 130 milliGRAM(s) SubCutaneous every 12 hours  famotidine    Tablet 40 milliGRAM(s) Oral daily  fenofibrate Tablet 145 milliGRAM(s) Oral daily  finasteride 5 milliGRAM(s) Oral daily  glucagon  Injectable 1 milliGRAM(s) IntraMuscular once  insulin lispro (ADMELOG) corrective regimen sliding scale   SubCutaneous three times a day before meals  levothyroxine 150 MICROGram(s) Oral daily  metoprolol tartrate 50 milliGRAM(s) Oral two times a day  pantoprazole    Tablet 40 milliGRAM(s) Oral before breakfast  tamsulosin 0.4 milliGRAM(s) Oral at bedtime  valsartan 80 milliGRAM(s) Oral daily    PRN MEDICATIONS  acetaminophen     Tablet .. 650 milliGRAM(s) Oral every 6 hours PRN  dextrose Oral Gel 15 Gram(s) Oral once PRN  melatonin 3 milliGRAM(s) Oral at bedtime PRN    VITALS:   T(F): 97.3  HR: 86  BP: 140/69  RR: 18  SpO2: 95%    LABS:    08-09    140  |  102  |  17  ----------------------------<  157<H>  4.0   |  25  |  1.0    Ca    9.1      09 Aug 2023 08:29  Mg     1.8     08-09        Urinalysis Basic - ( 09 Aug 2023 08:29 )    Color: x / Appearance: x / SG: x / pH: x  Gluc: 157 mg/dL / Ketone: x  / Bili: x / Urobili: x   Blood: x / Protein: x / Nitrite: x   Leuk Esterase: x / RBC: x / WBC x   Sq Epi: x / Non Sq Epi: x / Bacteria: x      RADIOLOGY:    PHYSICAL EXAM:  GEN: No acute distress  LUNGS: Clear to auscultation bilaterally   HEART: Regular  ABD: Soft, non-tender, non-distended.  EXT: NC/NC/NE/2+PP/RICKETTS/Skin Intact.   NEURO: AAOX3   SUBJECTIVE:    Patient is a 77y old Male who presents with a chief complaint of CHF (08 Aug 2023 16:20)     Today is hospital day 3d. JOSEEON. Patient denied CP, SOB, pain, nausea, sweating or headache.    PAST MEDICAL & SURGICAL HISTORY  Hypertension complications    GERD (gastroesophageal reflux disease)    ETELVINA (obstructive sleep apnea)  NOT USING cpap    CAD (coronary artery disease)  STENTS 2000 AND 2002 (4 stents)    DVT (deep venous thrombosis)  LEFT LE- 2008 AND 2018 AFTER INJURY    Seizure  last episode 2 yrs ago-REACTION TO CIPRO    High cholesterol    Hypothyroid    Pain    Back pain  chronic    Esophagitis    BPH (benign prostatic hyperplasia)    Myocardial infarction  2000    Afib  Sep 2019; on Xarelto    DM (diabetes mellitus)    HTN (hypertension)    Obesity    Sleep apnea  Dx 10 yrs ago; denies CPAP use    Dry eyes    H/O hernia repair  RT INGUINAL -2 AND LT INGUINAL -1    H/O arthroscopy of knee  RT -2 AND LT -1    CAD (coronary artery disease)  stents (4)    History of parathyroid surgery    H/O arthroscopy of shoulder  RT ROTATOR CUFF 2019    S/P cataract surgery  rt and lt    Kidney stone  RT ESWL OCT 2019      SOCIAL HISTORY:  Negative for smoking/alcohol/drug use.     ALLERGIES:  Cipro (Other)    MEDICATIONS:  STANDING MEDICATIONS  aspirin  chewable 81 milliGRAM(s) Oral daily  atorvastatin 80 milliGRAM(s) Oral at bedtime  dextrose 5%. 1000 milliLiter(s) IV Continuous <Continuous>  dextrose 5%. 1000 milliLiter(s) IV Continuous <Continuous>  dextrose 50% Injectable 25 Gram(s) IV Push once  dextrose 50% Injectable 12.5 Gram(s) IV Push once  dextrose 50% Injectable 25 Gram(s) IV Push once  dicyclomine 20 milliGRAM(s) Oral three times a day before meals  enoxaparin Injectable 130 milliGRAM(s) SubCutaneous every 12 hours  famotidine    Tablet 40 milliGRAM(s) Oral daily  fenofibrate Tablet 145 milliGRAM(s) Oral daily  finasteride 5 milliGRAM(s) Oral daily  glucagon  Injectable 1 milliGRAM(s) IntraMuscular once  insulin lispro (ADMELOG) corrective regimen sliding scale   SubCutaneous three times a day before meals  levothyroxine 150 MICROGram(s) Oral daily  metoprolol tartrate 50 milliGRAM(s) Oral two times a day  pantoprazole    Tablet 40 milliGRAM(s) Oral before breakfast  tamsulosin 0.4 milliGRAM(s) Oral at bedtime  valsartan 80 milliGRAM(s) Oral daily    PRN MEDICATIONS  acetaminophen     Tablet .. 650 milliGRAM(s) Oral every 6 hours PRN  dextrose Oral Gel 15 Gram(s) Oral once PRN  melatonin 3 milliGRAM(s) Oral at bedtime PRN    VITALS:   T(F): 97.3  HR: 86  BP: 140/69  RR: 18  SpO2: 95%    LABS:    08-09    140  |  102  |  17  ----------------------------<  157<H>  4.0   |  25  |  1.0    Ca    9.1      09 Aug 2023 08:29  Mg     1.8     08-09        Urinalysis Basic - ( 09 Aug 2023 08:29 )    Color: x / Appearance: x / SG: x / pH: x  Gluc: 157 mg/dL / Ketone: x  / Bili: x / Urobili: x   Blood: x / Protein: x / Nitrite: x   Leuk Esterase: x / RBC: x / WBC x   Sq Epi: x / Non Sq Epi: x / Bacteria: x      RADIOLOGY:    PHYSICAL EXAM:  GEN: No acute distress  LUNGS: Clear to auscultation bilaterally   HEART: Regular  ABD: Soft, non-tender, non-distended.  EXT: NC/NC/NE/2+PP/RICKETTS/Skin Intact.   NEURO: AAOX3

## 2023-08-09 NOTE — PROGRESS NOTE ADULT - SUBJECTIVE AND OBJECTIVE BOX
Patient is a 77y old  Male who presents with a chief complaint of CP (09 Aug 2023 10:53)      Patient seen and examined at bedside.    ALLERGIES:  Cipro (Other)    MEDICATIONS:  acetaminophen     Tablet .. 650 milliGRAM(s) Oral every 6 hours PRN  aspirin  chewable 81 milliGRAM(s) Oral daily  atorvastatin 80 milliGRAM(s) Oral at bedtime  dextrose 5%. 1000 milliLiter(s) IV Continuous <Continuous>  dextrose 5%. 1000 milliLiter(s) IV Continuous <Continuous>  dextrose 50% Injectable 25 Gram(s) IV Push once  dextrose 50% Injectable 12.5 Gram(s) IV Push once  dextrose 50% Injectable 25 Gram(s) IV Push once  dextrose Oral Gel 15 Gram(s) Oral once PRN  dicyclomine 20 milliGRAM(s) Oral three times a day before meals  famotidine    Tablet 40 milliGRAM(s) Oral daily  fenofibrate Tablet 145 milliGRAM(s) Oral daily  finasteride 5 milliGRAM(s) Oral daily  furosemide    Tablet 40 milliGRAM(s) Oral two times a day  glucagon  Injectable 1 milliGRAM(s) IntraMuscular once  insulin lispro (ADMELOG) corrective regimen sliding scale   SubCutaneous three times a day before meals  levothyroxine 150 MICROGram(s) Oral daily  magnesium sulfate  IVPB 2 Gram(s) IV Intermittent once  melatonin 3 milliGRAM(s) Oral at bedtime PRN  metoprolol tartrate 50 milliGRAM(s) Oral two times a day  pantoprazole    Tablet 40 milliGRAM(s) Oral before breakfast  rivaroxaban 20 milliGRAM(s) Oral with dinner  tamsulosin 0.4 milliGRAM(s) Oral at bedtime  valsartan 80 milliGRAM(s) Oral daily    Vital Signs Last 24 Hrs  T(F): 97.5 (09 Aug 2023 16:14), Max: 97.5 (09 Aug 2023 16:14)  HR: 71 (09 Aug 2023 16:14) (71 - 86)  BP: 143/62 (09 Aug 2023 16:14) (122/56 - 152/71)  RR: 20 (09 Aug 2023 16:14) (18 - 20)  SpO2: 95% (09 Aug 2023 12:15) (95% - 95%)  I&O's Summary      PHYSICAL EXAM:  General: NAD, A/O x 3  ENT: MMM  Neck: Supple, No JVD  Lungs: fine basal crackles, good air entry   Cardio: IRR, S1/S2, 2/6 systolic murmur   Abdomen: Soft, Nontender, Nondistended; Bowel sounds present  Extremities: No cyanosis,+2 LE edema     LABS:                        12.2   7.28  )-----------( 147      ( 07 Aug 2023 06:56 )             37.6     08-09    140  |  102  |  17  ----------------------------<  157  4.0   |  25  |  1.0    Ca    9.1      09 Aug 2023 08:29  Mg     1.8     08-09    TPro  6.9  /  Alb  4.5  /  TBili  0.8  /  DBili  x   /  AST  20  /  ALT  22  /  AlkPhos  53  08-07                TSH 1.45   TSH with FT4 reflex --  Total T3 --              POCT Blood Glucose.: 212 mg/dL (09 Aug 2023 16:34)  POCT Blood Glucose.: 156 mg/dL (09 Aug 2023 07:35)  POCT Blood Glucose.: 188 mg/dL (08 Aug 2023 21:23)      Urinalysis Basic - ( 09 Aug 2023 08:29 )    Color: x / Appearance: x / SG: x / pH: x  Gluc: 157 mg/dL / Ketone: x  / Bili: x / Urobili: x   Blood: x / Protein: x / Nitrite: x   Leuk Esterase: x / RBC: x / WBC x   Sq Epi: x / Non Sq Epi: x / Bacteria: x            RADIOLOGY & ADDITIONAL TESTS:    Care Discussed with Consultants/Other Providers:

## 2023-08-09 NOTE — PROGRESS NOTE ADULT - ASSESSMENT
77-year-old male with a pmhx of HTN, HLD, CAD s/p 5 PCIs, DM, Afib s/p DCCV on xarelto presented to the ED from urgent care complaining of chest pain and dyspnea on exertion. Associated with one week of BL LE edema, sweating, and inability to sleep. Patient recently started on HRT 4 weeks ago.       #Chest pain and dyspnea, 2/2 acute HFpEF  #h/o Afib with RVR s/p DCCV  #h/o CAD s/p 5 PCIs  - Last cath was in 2019 (dr Borrego) showed significant 2 vessel disease in the proximal LCx (100% occluded) and proximal RCA (90% occluded). had PCI to proximal RCA   - Had DCCV in 2020 successfully to NSR   - Loaded with asa 324 in urgent care. s/p 1 IV lasix 40   - on admission BNP 1655, trops 0.02-->0.02-->0.02  - CTA on admission: did not show a PE, showed BL GGO and septal thickening compatible with pulmonary edema. Dilated thoracic aortameasuring 4.4 cm, unchanged. Main pulmonary artery measures 3.6 cm, unchanged, which may be suggestive of pulmonary hypertension.  - EKG on admission showed T-wave inversion in lead III. Afib   - patient was on xarelto, switched to lovenox on admission  - TTE: unremarkable, EF>55%  - f/u duplex  - was on IV lasix 40 BID (patient home on 40 qd) -- switched to po lasix 40 bid on 8/9 -- f/u cardio rec for lasix dose after PHILIP today  - monitor BMP daily  - c/w remote tele  - patient follows outpatient private MD Dr. Steele at [phone], contacted  - cardiology team following patient, plan for PHILIP/cardioversion 8/9    #HRT therapy  - hold off now - sweating and insomnia most likely 2/2 to HRT  - was on testosterone cypionate injection every 2 wks since 4 wks ago - received 2 doses  - follows Dr. Sheldon outpatient    #Hypomagnesemia  - daily bmp  - replete    #HTN   - cw with ASA, metoprolol    #HLD   - cw home meds     #DM   - monitor FS  - start insulin basal-bolus if >180 persistently     #h/o GERD  - at home on pepcid and omeprazole  - started on pepcid and protonix    #BPH   - cw home meds      MIsc;  DVT ppx: lovenox   GI ppx: protonix and pepcid   activity; AAT  Diet; Dash/CC- fluid restriction    77-year-old male with a pmhx of HTN, HLD, CAD s/p 5 PCIs, DM, Afib s/p DCCV on xarelto presented to the ED from urgent care complaining of chest pain and dyspnea on exertion. Associated with one week of BL LE edema, sweating, and inability to sleep. Patient recently started on HRT 4 weeks ago.       #Chest pain and dyspnea, 2/2 acute HFpEF  #h/o Afib with RVR s/p DCCV  #h/o CAD s/p 5 PCIs  - Last cath was in 2019 (dr Borrego) showed significant 2 vessel disease in the proximal LCx (100% occluded) and proximal RCA (90% occluded). had PCI to proximal RCA   - Had DCCV in 2020 successfully to NSR   - Loaded with asa 324 in urgent care. s/p 1 IV lasix 40   - on admission BNP 1655, trops 0.02-->0.02-->0.02  - CTA on admission: did not show a PE, showed BL GGO and septal thickening compatible with pulmonary edema. Dilated thoracic aortameasuring 4.4 cm, unchanged. Main pulmonary artery measures 3.6 cm, unchanged, which may be suggestive of pulmonary hypertension.  - EKG on admission showed T-wave inversion in lead III. Afib   - patient was on xarelto, switched to lovenox on admission  - TTE: unremarkable, EF>55%  - f/u duplex  - was on IV lasix 40 BID (patient home on 40 qd) -- switched to po lasix 40 bid on 8/9  - cardiology team following, s/p PHILIP/DCCV 8/9, anticoag ck to xarelto per cardio  - on ecg 8/9 found to have Mobitz I, discussed with cardio, will monitor  - EP eval consulted  - monitor BMP daily  - c/w remote tele  - pulm consulted, will c/w diuresis, encourage bipap, patient follows Dr. Burk outpatient  - patient follows outpatient Dr. Steele at [phone], contacted    #HRT therapy  - hold off now - sweating and insomnia most likely 2/2 to HRT  - was on testosterone cypionate injection every 2 wks since 4 wks ago - received 2 doses  - follows Dr. Sheldon outpatient    #Hypomagnesemia  - daily bmp  - replete    #HTN   - cw with ASA, metoprolol    #HLD   - cw home meds     #DM   - monitor FS  - start insulin basal-bolus if >180 persistently     #h/o GERD  - at home on pepcid and omeprazole  - started on pepcid and protonix    #BPH   - cw home meds      MIsc;  DVT ppx: xarelto  GI ppx: protonix and pepcid   activity; AAT  Diet; Dash/CC- fluid restriction    77-year-old male with a pmhx of HTN, HLD, CAD s/p 5 PCIs, DM, Afib s/p DCCV on xarelto presented to the ED from urgent care complaining of chest pain and dyspnea on exertion. Associated with one week of BL LE edema, sweating, and inability to sleep. Patient recently started on HRT 4 weeks ago.       #Chest pain and dyspnea, 2/2 acute HFpEF  #h/o Afib with RVR s/p DCCV  #h/o CAD s/p 5 PCIs  - Last cath was in 2019 (dr Borrego) showed significant 2 vessel disease in the proximal LCx (100% occluded) and proximal RCA (90% occluded). had PCI to proximal RCA   - Had DCCV in 2020 successfully to NSR   - Loaded with asa 324 in urgent care. s/p 1 IV lasix 40   - on admission BNP 1655, trops 0.02-->0.02-->0.02  - CTA on admission: did not show a PE, showed BL GGO and septal thickening compatible with pulmonary edema. Dilated thoracic aortameasuring 4.4 cm, unchanged. Main pulmonary artery measures 3.6 cm, unchanged, which may be suggestive of pulmonary hypertension.  - EKG on admission showed T-wave inversion in lead III. Afib   - patient was on xarelto, switched to lovenox on admission  - TTE: unremarkable, EF>55%  - f/u duplex  - was on IV lasix 40 BID (patient home on 40 qd) -- switched to po lasix 40 bid on 8/9  - c/w po lasix 40 bid  - cardiology team following, s/p PHILIP/DCCV 8/9, anticoag switch back to xarelto per cardio  - on ecg 8/9 found to have Mobitz I, discussed with cardio, will monitor  - EP eval consulted  - monitor BMP daily  - c/w remote tele  - pulm consulted, will c/w diuresis, encourage bipap, patient follows Dr. Burk outpatient  - patient follows outpatient Dr. Steele at [phone], contacted    #HRT therapy  - hold off now - sweating and insomnia most likely 2/2 to HRT  - was on testosterone cypionate injection every 2 wks since 4 wks ago - received 2 doses  - follows Dr. Sheldon outpatient    #Hypomagnesemia  - daily bmp  - replete    #HTN   - cw with ASA, metoprolol    #HLD   - cw home meds     #DM   - monitor FS  - start insulin basal-bolus if >180 persistently     #h/o GERD  - at home on pepcid and omeprazole  - started on pepcid and protonix    #BPH   - cw home meds      MIsc;  DVT ppx: xarelto  GI ppx: protonix and pepcid   activity; AAT  Diet; Dash/CC- fluid restriction

## 2023-08-09 NOTE — CHART NOTE - NSCHARTNOTEFT_GEN_A_CORE
PRE-OP DIAGNOSIS:  - A fib     POST-OP DIAGNOSIS:  - Normal EF   - Moderate MR  - Successful cardioversion to NSR with 200J of direct current     PROCEDURE: Transesophageal echocardiogram    Primary Physician: Dr. Terry  Assistant: Dr. Qureshi    ANESTHESIA TYPE:   [ x ] Conscious Sedation    CONDITION:  [  ] Good    ESTIMATED BLOOD LOSS: None    COMPLICATIONS: None    FINDINGS:    After risks and benefits of procedures were explained, informed consent was obtained and placed in chart. Refer to Anesthesia note for sedation details.  The PHIILP probe was passed into the esophagus without difficulty.  Transesophageal images were obtained.  The PHILIP probe was removed without difficulty and examined.  There was no evidence for bleeding.  The patient tolerated the procedure well without any immediate PHILIP-related complications.      Preliminary Findings:  LA: Moderately enlarged.   LISSETH: Left atrial appendage was clear of clot and smoke.  LV: LVEF was estimated at 55%.   MV: Moderate MR, no evidence of MS.   AV: Mild AI, no evidence of AS.   Right structure not visualized   IAS: no PFO. No R-> L shunt with agitated saline and doppler color.   Aorta: There was mild, non-mobile atheroma seen in the thoracic aorta.     Patient successfully converted to sinus rhythm with synchronized  200J of direct current cardioversion with transient LBBB.     PLAN OF CARE:  - Return to inpatient bed   - Continue AC   - Consider EP evaluation

## 2023-08-09 NOTE — CONSULT NOTE ADULT - SUBJECTIVE AND OBJECTIVE BOX
Patient is a 77y old  Male who presents with a chief complaint of CP (09 Aug 2023 10:53)      HPI:  77-year-old male with a pmhx of HTN, HLD, CAD s/p 5 PCIs, DM, Afib s/p DCCV on xarelto presented to the ED from urgent care complaining of chest pain and dyspnea on exertion. Patient said he has been sweating profusely since one week, and unable to sleep with increasing LE edema BL. Today patient started having chest pressure in the middle of the chest, nonradiating, patient describes its different from previous heart attacks, and felt tapia when going upstairs. He went to  were they loaded with asprin and chest pain resolved, he was found to be in Afib and was sent to the ED. Denies nausea, vomiting, or other complaints.  pt mentions to have been on HRT over the past 4 weeks.    In the ED trops were 0.02 (baseline last year was<0.01), BNP 1655, they did a CTA which did not show a PE but showed BL GGO and septal thickening compatible with pulmonary edema. EKG showed only showed T-wave inversion in lead III. He was given lasix 40 IV and admitted to medicine     ED VS:   · Temp (F)	99.4  · Temp (C) Temp (C)	37.4  · Temp site Temp Site	oral  · Heart Rate Heart Rate (beats/min)	88  · BP Systolic Systolic	144  · BP Diastolic Diastolic (mm Hg)	85  · Respiration Rate (breaths/min) Respiration Rate (breaths/min)	18  · SpO2 (%) SpO2 (%)	95         (06 Aug 2023 23:08)      PAST MEDICAL & SURGICAL HISTORY:  Hypertension complications      GERD (gastroesophageal reflux disease)      ETELVINA (obstructive sleep apnea)  NOT USING cpap      CAD (coronary artery disease)  STENTS 2000 AND 2002 (4 stents)      DVT (deep venous thrombosis)  LEFT LE- 2008 AND 2018 AFTER INJURY      Seizure  last episode 2 yrs ago-REACTION TO CIPRO      High cholesterol      Hypothyroid      Pain      Back pain  chronic      Esophagitis      BPH (benign prostatic hyperplasia)      Myocardial infarction  2000      Afib  Sep 2019; on Xarelto      DM (diabetes mellitus)      HTN (hypertension)      Obesity      Sleep apnea  Dx 10 yrs ago; denies CPAP use      Dry eyes      H/O hernia repair  RT INGUINAL -2 AND LT INGUINAL -1      H/O arthroscopy of knee  RT -2 AND LT -1      CAD (coronary artery disease)  stents (4)      History of parathyroid surgery      H/O arthroscopy of shoulder  RT ROTATOR CUFF 2019      S/P cataract surgery  rt and lt      Kidney stone  RT ESWL OCT 2019          SOCIAL HX:   Smoking      denies                   ETOH                            Other    FAMILY HISTORY:  .  No cardiovascular or pulmonary family history     REVIEW OF SYSTEMS:    All ROS are negative exept per HPI       Allergies    Cipro (Other)    Intolerances          PHYSICAL EXAM  Vital Signs Last 24 Hrs  T(C): 36.3 (09 Aug 2023 12:15), Max: 36.4 (08 Aug 2023 16:00)  T(F): 97.3 (09 Aug 2023 07:47), Max: 97.5 (08 Aug 2023 16:00)  HR: 86 (09 Aug 2023 12:15) (78 - 88)  BP: 140/69 (09 Aug 2023 12:15) (117/61 - 152/71)  BP(mean): 102 (09 Aug 2023 12:15) (102 - 102)  RR: 18 (09 Aug 2023 12:15) (18 - 18)  SpO2: 95% (09 Aug 2023 12:15) (95% - 95%)    Parameters below as of 09 Aug 2023 05:19  Patient On (Oxygen Delivery Method): room air        CONSTITUTIONAL:  Well nourished.  NAD    ENT:   Airway patent,   No thrush    EYES:   Clear bilaterally,   pupils equal,   round and reactive to light.    CARDIAC:   Normal rate,   regular rhythm.    2+ LE edema     RESPIRATORY:   decreased audible bilateral breath sounds     GASTROINTESTINAL:  Abdomen soft, non-tender,   No guarding,   Positive BS    MUSCULOSKELETAL:   Range of motion is not limited,  No clubbing, cyanosis    NEUROLOGICAL:   Alert and oriented   No motor deficits.    SKIN:   Skin normal color for race,   No evidence of rash.    LABS:                                                08-09    140  |  102  |  17  ----------------------------<  157<H>  4.0   |  25  |  1.0    Ca    9.1      09 Aug 2023 08:29  Mg     1.8     08-09                                               Urinalysis Basic - ( 09 Aug 2023 08:29 )    Color: x / Appearance: x / SG: x / pH: x  Gluc: 157 mg/dL / Ketone: x  / Bili: x / Urobili: x   Blood: x / Protein: x / Nitrite: x   Leuk Esterase: x / RBC: x / WBC x   Sq Epi: x / Non Sq Epi: x / Bacteria: x                                                                                                                                         MEDICATIONS  (STANDING):  aspirin  chewable 81 milliGRAM(s) Oral daily  atorvastatin 80 milliGRAM(s) Oral at bedtime  dextrose 5%. 1000 milliLiter(s) (50 mL/Hr) IV Continuous <Continuous>  dextrose 5%. 1000 milliLiter(s) (100 mL/Hr) IV Continuous <Continuous>  dextrose 50% Injectable 25 Gram(s) IV Push once  dextrose 50% Injectable 12.5 Gram(s) IV Push once  dextrose 50% Injectable 25 Gram(s) IV Push once  dicyclomine 20 milliGRAM(s) Oral three times a day before meals  enoxaparin Injectable 130 milliGRAM(s) SubCutaneous every 12 hours  famotidine    Tablet 40 milliGRAM(s) Oral daily  fenofibrate Tablet 145 milliGRAM(s) Oral daily  finasteride 5 milliGRAM(s) Oral daily  furosemide    Tablet 40 milliGRAM(s) Oral two times a day  glucagon  Injectable 1 milliGRAM(s) IntraMuscular once  insulin lispro (ADMELOG) corrective regimen sliding scale   SubCutaneous three times a day before meals  levothyroxine 150 MICROGram(s) Oral daily  magnesium sulfate  IVPB 2 Gram(s) IV Intermittent once  metoprolol tartrate 50 milliGRAM(s) Oral two times a day  pantoprazole    Tablet 40 milliGRAM(s) Oral before breakfast  tamsulosin 0.4 milliGRAM(s) Oral at bedtime  valsartan 80 milliGRAM(s) Oral daily    MEDICATIONS  (PRN):  acetaminophen     Tablet .. 650 milliGRAM(s) Oral every 6 hours PRN Mild Pain (1 - 3), Moderate Pain (4 - 6)  dextrose Oral Gel 15 Gram(s) Oral once PRN Blood Glucose LESS THAN 70 milliGRAM(s)/deciliter  melatonin 3 milliGRAM(s) Oral at bedtime PRN Insomnia      X-Rays reviewed:    CXR interpreted by me:

## 2023-08-09 NOTE — CONSULT NOTE ADULT - ASSESSMENT
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 fib with RVR s/p DCCV  HO CAD s/p PCI x5    Recommendations:     CTA chest noted, likely volume overload.   TTE/PHILIP noted, s/p DCCV today. AC per cardio.   Recommended that patient use his CPAP at home. Patient amenable to use it at home.   Resume home inhalers on discharge.   Follow up OP with Dr. Burk.

## 2023-08-09 NOTE — PROGRESS NOTE ADULT - ASSESSMENT
77-year-old male with a pmhx of HTN, HLD, CAD s/p 5 PCIs, DM, Afib s/p DCCV on xarelto presented to the ED from urgent care complaining of chest pain and dyspnea on exertion. Associated with one week of BL LE edema, sweating, and inability to sleep. Patient recently started on HRT 4 weeks ago.     #Chest pain - resolved   #fluid overload- acute on chronic HFpEF   #h/o Paroxysmal Afib with RVR s/p DCCV  #h/o CAD s/p 5 PCIs - last one was 3 yeas ago   #HTN/DL  - Last cath was in 2019 (dr Borrego) showed significant 2 vessel disease in the proximal LCx (100% occluded) and proximal RCA (90% occluded). had PCI to proximal RCA   - Had DCCV in 2020 successfully to NSR   - cardio cs appreicated- patient follows outpatient private MD Dr. Steele at [phone]  - echo reviewed - EF > 55%  - diuresing with IV lasix 40mg q12 - switch to oral 8/9  - 8/9 pt had cardioversion - developed Mobitz I HB, cardiology is aware and recommended continued remote tele and EP consult   - daily weights  - fluid restricted diet  - patient was on xarelto at home  - on asa/statin/fenofibrate    #HRT therapy - patient was on testosterone injections - will stop now  - follows outpatient with Dr. Sheldon     #Suspected magnesium deficiency - resolved   - supplemented on 8/7    #DM II with hyperglycemia  - add Lantus and lispro to regimen  - continue sliding scale as well  - check fs qac and qhs  - 7.3 A1c  - CHO consistent diet   - diabetic teaching/education     #BPH   - on finasteride and flomax    #obesity  -diet and lifestyle modification    #Hypothyroid  -TSH wnl   -on synthroid     Pending: EP consult, AM ekg, monitor tele

## 2023-08-10 ENCOUNTER — TRANSCRIPTION ENCOUNTER (OUTPATIENT)
Age: 78
End: 2023-08-10

## 2023-08-10 VITALS
HEART RATE: 75 BPM | DIASTOLIC BLOOD PRESSURE: 60 MMHG | RESPIRATION RATE: 18 BRPM | SYSTOLIC BLOOD PRESSURE: 123 MMHG | TEMPERATURE: 98 F

## 2023-08-10 LAB
ANION GAP SERPL CALC-SCNC: 12 MMOL/L — SIGNIFICANT CHANGE UP (ref 7–14)
BUN SERPL-MCNC: 16 MG/DL — SIGNIFICANT CHANGE UP (ref 10–20)
CALCIUM SERPL-MCNC: 8.7 MG/DL — SIGNIFICANT CHANGE UP (ref 8.4–10.5)
CHLORIDE SERPL-SCNC: 104 MMOL/L — SIGNIFICANT CHANGE UP (ref 98–110)
CO2 SERPL-SCNC: 23 MMOL/L — SIGNIFICANT CHANGE UP (ref 17–32)
CREAT SERPL-MCNC: 1 MG/DL — SIGNIFICANT CHANGE UP (ref 0.7–1.5)
EGFR: 78 ML/MIN/1.73M2 — SIGNIFICANT CHANGE UP
GLUCOSE BLDC GLUCOMTR-MCNC: 147 MG/DL — HIGH (ref 70–99)
GLUCOSE BLDC GLUCOMTR-MCNC: 160 MG/DL — HIGH (ref 70–99)
GLUCOSE BLDC GLUCOMTR-MCNC: 173 MG/DL — HIGH (ref 70–99)
GLUCOSE SERPL-MCNC: 173 MG/DL — HIGH (ref 70–99)
MAGNESIUM SERPL-MCNC: 1.8 MG/DL — SIGNIFICANT CHANGE UP (ref 1.8–2.4)
POTASSIUM SERPL-MCNC: 4.1 MMOL/L — SIGNIFICANT CHANGE UP (ref 3.5–5)
POTASSIUM SERPL-SCNC: 4.1 MMOL/L — SIGNIFICANT CHANGE UP (ref 3.5–5)
SODIUM SERPL-SCNC: 139 MMOL/L — SIGNIFICANT CHANGE UP (ref 135–146)

## 2023-08-10 PROCEDURE — 93010 ELECTROCARDIOGRAM REPORT: CPT | Mod: 76

## 2023-08-10 PROCEDURE — 93970 EXTREMITY STUDY: CPT | Mod: 26

## 2023-08-10 PROCEDURE — 99223 1ST HOSP IP/OBS HIGH 75: CPT

## 2023-08-10 PROCEDURE — 99239 HOSP IP/OBS DSCHRG MGMT >30: CPT

## 2023-08-10 RX ORDER — ASPIRIN/CALCIUM CARB/MAGNESIUM 324 MG
1 TABLET ORAL
Qty: 0 | Refills: 0 | DISCHARGE
Start: 2023-08-10

## 2023-08-10 RX ORDER — DRONEDARONE 400 MG/1
1 TABLET, FILM COATED ORAL
Qty: 28 | Refills: 0
Start: 2023-08-10 | End: 2023-08-23

## 2023-08-10 RX ADMIN — Medication 40 MILLIGRAM(S): at 13:17

## 2023-08-10 RX ADMIN — Medication 20 MILLIGRAM(S): at 11:14

## 2023-08-10 RX ADMIN — FINASTERIDE 5 MILLIGRAM(S): 5 TABLET, FILM COATED ORAL at 11:14

## 2023-08-10 RX ADMIN — Medication 650 MILLIGRAM(S): at 12:49

## 2023-08-10 RX ADMIN — PANTOPRAZOLE SODIUM 40 MILLIGRAM(S): 20 TABLET, DELAYED RELEASE ORAL at 05:02

## 2023-08-10 RX ADMIN — Medication 50 MILLIGRAM(S): at 17:32

## 2023-08-10 RX ADMIN — Medication 2: at 08:22

## 2023-08-10 RX ADMIN — VALSARTAN 80 MILLIGRAM(S): 80 TABLET ORAL at 05:01

## 2023-08-10 RX ADMIN — Medication 2: at 16:38

## 2023-08-10 RX ADMIN — Medication 81 MILLIGRAM(S): at 11:14

## 2023-08-10 RX ADMIN — Medication 650 MILLIGRAM(S): at 13:16

## 2023-08-10 RX ADMIN — RIVAROXABAN 20 MILLIGRAM(S): KIT at 17:32

## 2023-08-10 RX ADMIN — Medication 50 MILLIGRAM(S): at 05:01

## 2023-08-10 RX ADMIN — Medication 20 MILLIGRAM(S): at 16:38

## 2023-08-10 RX ADMIN — Medication 20 MILLIGRAM(S): at 05:02

## 2023-08-10 RX ADMIN — Medication 40 MILLIGRAM(S): at 05:02

## 2023-08-10 RX ADMIN — Medication 150 MICROGRAM(S): at 05:01

## 2023-08-10 RX ADMIN — Medication 145 MILLIGRAM(S): at 11:13

## 2023-08-10 NOTE — DISCHARGE NOTE PROVIDER - NSDCFUSCHEDAPPT_GEN_ALL_CORE_FT
[facility]  CARDIOLOGY [address]  Scheduled Appointment: 08/15/2023    [facility]  CARDIOLOGY [address]  Scheduled Appointment: 08/15/2023    Nakul Steele  [facility]  CARDIOLOGY [address]  Scheduled Appointment: 09/06/2023

## 2023-08-10 NOTE — DIETITIAN INITIAL EVALUATION ADULT - NS FNS DIET ORDER
Diet, DASH/TLC:   Sodium & Cholesterol Restricted  Consistent Carbohydrate {No Snacks}  1500mL Fluid Restriction ([id number])     Special Instructions for Nursin milliLiter(s) to 2000 milliLiter(s) fluid restriction (23 @ 00:07) [Active]

## 2023-08-10 NOTE — PROGRESS NOTE ADULT - SUBJECTIVE AND OBJECTIVE BOX
SUBJECTIVE:    Patient is a 77y old Male who presents with a chief complaint of CP (09 Aug 2023 16:45)     Today is hospital day 4d. NAEON. Patient not complaining of CP or SOB today. Heart sounds irregular on physical exam.      PAST MEDICAL & SURGICAL HISTORY  Hypertension complications    GERD (gastroesophageal reflux disease)    ETELVINA (obstructive sleep apnea)  NOT USING cpap    CAD (coronary artery disease)  STENTS 2000 AND 2002 (4 stents)    DVT (deep venous thrombosis)  LEFT LE- 2008 AND 2018 AFTER INJURY    Seizure  last episode 2 yrs ago-REACTION TO CIPRO    High cholesterol    Hypothyroid    Pain    Back pain  chronic    Esophagitis    BPH (benign prostatic hyperplasia)    Myocardial infarction  2000    Afib  Sep 2019; on Xarelto    DM (diabetes mellitus)    HTN (hypertension)    Obesity    Sleep apnea  Dx 10 yrs ago; denies CPAP use    Dry eyes    H/O hernia repair  RT INGUINAL -2 AND LT INGUINAL -1    H/O arthroscopy of knee  RT -2 AND LT -1    CAD (coronary artery disease)  stents (4)    History of parathyroid surgery    H/O arthroscopy of shoulder  RT ROTATOR CUFF 2019    S/P cataract surgery  rt and lt    Kidney stone  RT ESWL OCT 2019      SOCIAL HISTORY:  Negative for smoking/alcohol/drug use.     ALLERGIES:  Cipro (Other)    MEDICATIONS:  STANDING MEDICATIONS  aspirin  chewable 81 milliGRAM(s) Oral daily  atorvastatin 80 milliGRAM(s) Oral at bedtime  dextrose 5%. 1000 milliLiter(s) IV Continuous <Continuous>  dextrose 5%. 1000 milliLiter(s) IV Continuous <Continuous>  dextrose 50% Injectable 25 Gram(s) IV Push once  dextrose 50% Injectable 12.5 Gram(s) IV Push once  dextrose 50% Injectable 25 Gram(s) IV Push once  dicyclomine 20 milliGRAM(s) Oral three times a day before meals  famotidine    Tablet 40 milliGRAM(s) Oral daily  fenofibrate Tablet 145 milliGRAM(s) Oral daily  finasteride 5 milliGRAM(s) Oral daily  furosemide    Tablet 40 milliGRAM(s) Oral two times a day  glucagon  Injectable 1 milliGRAM(s) IntraMuscular once  insulin lispro (ADMELOG) corrective regimen sliding scale   SubCutaneous three times a day before meals  levothyroxine 150 MICROGram(s) Oral daily  metoprolol tartrate 50 milliGRAM(s) Oral two times a day  pantoprazole    Tablet 40 milliGRAM(s) Oral before breakfast  rivaroxaban 20 milliGRAM(s) Oral with dinner  tamsulosin 0.4 milliGRAM(s) Oral at bedtime  valsartan 80 milliGRAM(s) Oral daily    PRN MEDICATIONS  acetaminophen     Tablet .. 650 milliGRAM(s) Oral every 6 hours PRN  dextrose Oral Gel 15 Gram(s) Oral once PRN  melatonin 3 milliGRAM(s) Oral at bedtime PRN    VITALS:   T(F): 97.1  HR: 75  BP: 160/76  RR: 18  SpO2: 96%    LABS:    08-10    139  |  104  |  16  ----------------------------<  173<H>  4.1   |  23  |  1.0    Ca    8.7      10 Aug 2023 07:45  Mg     1.8     08-10        Urinalysis Basic - ( 10 Aug 2023 07:45 )    Color: x / Appearance: x / SG: x / pH: x  Gluc: 173 mg/dL / Ketone: x  / Bili: x / Urobili: x   Blood: x / Protein: x / Nitrite: x   Leuk Esterase: x / RBC: x / WBC x   Sq Epi: x / Non Sq Epi: x / Bacteria: x    RADIOLOGY:    PHYSICAL EXAM:  GEN: No acute distress  LUNGS: Clear to auscultation bilaterally   HEART: Irregular rate  ABD: Soft, non-tender, non-distended.  EXT: NC/NC/NE/2+PP/RICKETTS/Skin Intact.   NEURO: AAOX3

## 2023-08-10 NOTE — DIETITIAN INITIAL EVALUATION ADULT - PHYSCIAL ASSESSMENT
cognition: A&ox4    Height (cm): 180.3 (09 Aug 2023 12:15)  Weight (kg): 129.3 (09 Aug 2023 12:15)  BMI (kg/m2): 39.8 (09 Aug 2023 12:15)  IBW: 78.2 kg   UBW: 127 kg (1/31/23) per EMR   %wt change: 1.7% wt gain x 8 months; not clinically significant     NFPE: unable to perform NFPE; will perform as able at follow up

## 2023-08-10 NOTE — CONSULT NOTE ADULT - SUBJECTIVE AND OBJECTIVE BOX
Outpt cardiologist: Dr Steele    HPI:  77-year-old male with a pmhx of HTN, HLD, CAD s/p 5 PCIs, DM, Afib s/p DCCV on xarelto presented to the ED from urgent care complaining of chest pain and dyspnea on exertion. Patient said he has been sweating profusely since one week, and unable to sleep with increasing LE edema BL. Today patient started having chest pressure in the middle of the chest, nonradiating, patient describes its different from previous heart attacks, and felt tapia when going upstairs. He went to  were they loaded with asprin and chest pain resolved, he was found to be in Afib and was sent to the ED. Denies nausea, vomiting, or other complaints.  pt mentions to have been on HRT over the past 4 weeks.    In the ED trops were 0.02 (baseline last year was<0.01), BNP 1655, they did a CTA which did not show a PE but showed BL GGO and septal thickening compatible with pulmonary edema. EKG showed only showed T-wave inversion in lead III. He was given lasix 40 IV and admitted to medicine     ED VS:   · Temp (F)	99.4  · Temp (C) Temp (C)	37.4  · Temp site Temp Site	oral  · Heart Rate Heart Rate (beats/min)	88  · BP Systolic Systolic	144  · BP Diastolic Diastolic (mm Hg)	85  · Respiration Rate (breaths/min) Respiration Rate (breaths/min)	18  · SpO2 (%) SpO2 (%)	95         (06 Aug 2023 23:08)      PAST MEDICAL & SURGICAL HISTORY  Hypertension complications    GERD (gastroesophageal reflux disease)    ETELVINA (obstructive sleep apnea)  NOT USING cpap    CAD (coronary artery disease)  STENTS  AND  (4 stents)    DVT (deep venous thrombosis)  LEFT LE-  AND 2018 AFTER INJURY    Seizure  last episode 2 yrs ago-REACTION TO CIPRO    High cholesterol    Hypothyroid    Pain    Back pain  chronic    Esophagitis    BPH (benign prostatic hyperplasia)    Myocardial infarction      Afib  Sep 2019; on Xarelto    DM (diabetes mellitus)    HTN (hypertension)    Obesity    Sleep apnea  Dx 10 yrs ago; denies CPAP use    Dry eyes    H/O hernia repair  RT INGUINAL -2 AND LT INGUINAL -1    H/O arthroscopy of knee  RT -2 AND LT -1    CAD (coronary artery disease)  stents (4)    History of parathyroid surgery    H/O arthroscopy of shoulder  RT ROTATOR CUFF 2019    S/P cataract surgery  rt and lt    Kidney stone  RT ESWL OCT 2019        FAMILY HISTORY:  FAMILY HISTORY:      SOCIAL HISTORY:  Social History:      ALLERGIES:  Cipro (Other)      MEDICATIONS:  aspirin  chewable 81 milliGRAM(s) Oral daily  atorvastatin 80 milliGRAM(s) Oral at bedtime  dextrose 5%. 1000 milliLiter(s) (50 mL/Hr) IV Continuous <Continuous>  dextrose 5%. 1000 milliLiter(s) (100 mL/Hr) IV Continuous <Continuous>  dextrose 50% Injectable 25 Gram(s) IV Push once  dextrose 50% Injectable 12.5 Gram(s) IV Push once  dextrose 50% Injectable 25 Gram(s) IV Push once  dicyclomine 20 milliGRAM(s) Oral three times a day before meals  famotidine    Tablet 40 milliGRAM(s) Oral daily  fenofibrate Tablet 145 milliGRAM(s) Oral daily  finasteride 5 milliGRAM(s) Oral daily  furosemide    Tablet 40 milliGRAM(s) Oral two times a day  glucagon  Injectable 1 milliGRAM(s) IntraMuscular once  insulin lispro (ADMELOG) corrective regimen sliding scale   SubCutaneous three times a day before meals  levothyroxine 150 MICROGram(s) Oral daily  metoprolol tartrate 50 milliGRAM(s) Oral two times a day  pantoprazole    Tablet 40 milliGRAM(s) Oral before breakfast  rivaroxaban 20 milliGRAM(s) Oral with dinner  tamsulosin 0.4 milliGRAM(s) Oral at bedtime  valsartan 80 milliGRAM(s) Oral daily    PRN:  acetaminophen     Tablet .. 650 milliGRAM(s) Oral every 6 hours PRN  dextrose Oral Gel 15 Gram(s) Oral once PRN  melatonin 3 milliGRAM(s) Oral at bedtime PRN      HOME MEDICATIONS:  Home Medications:  aspirin 81 mg oral tablet, chewable: 1 tab(s) orally once a day (10 Aug 2023 16:22)  dicyclomine 20 mg oral tablet: 3 times a day (10 Aug 2023 16:22)  famotidine 40 mg oral tablet: 1 tab(s) orally once a day (at bedtime) (23 Aug 2022 22:06)  fenofibrate 145 mg oral tablet:  (10 Aug 2023 16:22)  finasteride 5 mg oral tablet: 1 orally once a day (06 Aug 2023 23:27)  Flomax 0.4 mg oral capsule: 1 cap(s) orally once a day (23 Aug 2022 22:06)  Lasix 40 mg oral tablet: 1 tab(s) orally once a day (23 Aug 2022 22:06)  levothyroxine 150 mcg (0.15 mg) oral tablet:  (10 Aug 2023 16:22)  metFORMIN 500 mg oral tablet: 1 tab(s) orally 2 times a day  HOLD METFORMIN FOR 48 HRS AFTER CARDIAC CATH. (23 Aug 2022 22:06)  metoprolol tartrate 50 mg oral tablet: 1 tab(s) orally 2 times a day (23 Aug 2022 22:06)  omeprazole 40 mg oral delayed release capsule: 1 cap(s) orally once a day (23 Aug 2022 22:06)  rosuvastatin 40 mg oral tablet: 1 tab(s) orally once a day (23 Aug 2022 22:06)  traMADol 50 mg oral tablet: 1 orally 2 times a day (06 Aug 2023 23:29)  valsartan 80 mg oral tablet: 2 tablets daily (23 Aug 2022 22:06)  Xarelto 20 mg oral tablet: 1 tab(s) orally once a day (in the evening) (23 Aug 2022 22:06)      VITALS:   T(F): 98.5 (08-10 @ 15:32), Max: 98.5 (08-10 @ 15:32)  HR: 75 (08-10 @ 15:32) (71 - 96)  BP: 123/60 (08-10 @ 15:32) (117/61 - 169/77)  BP(mean): 83 (08-10 @ 15:32) (83 - 110)  RR: 18 (08-10 @ 15:32) (18 - 20)  SpO2: 96% (08-10 @ 06:06) (95% - 97%)    I&O's Summary      REVIEW OF SYSTEMS:  CONSTITUTIONAL: No weakness, fevers or chills  HEENT: No visual changes, neck/ear pain  RESPIRATORY: No cough   CARDIOVASCULAR: See HPI  GASTROINTESTINAL: No abdominal pain. No nausea, vomiting, diarrhea   GENITOURINARY: No dysuria, frequency or hematuria  NEUROLOGICAL: No new focal deficits  SKIN: No new rashes    PHYSICAL EXAM:  General: Not in distress.   HEENT: EOMI  Cardio: regular, S1, S2   Pulm: B/L BS.     Abdomen: Soft, non-tender, non-distended. Normoactive bowel sounds  Extremities: No edema b/l le  Neuro: A&O x3. No focal deficits    LABS:    08-10    139  |  104  |  16  ----------------------------<  173<H>  4.1   |  23  |  1.0    Ca    8.7      10 Aug 2023 07:45  Mg     1.8     08-10              RADIOLOGY:  < from: Transesophageal Echocardiogram (23 @ 12:36) >    Summary:   1. Left ventricular ejection fraction, by visual estimation, is 50 to   55%.   2. Normal global left ventricular systolic function.   3. Mild to moderate mitral valve regurgitation.   4. Mild aortic regurgitation.   5. Saline contrast bubble study was negative, with no evidence of any   intracardiac shunt.   6. No left atrial appendage thrombus and decreased left atrial appendage   velocities.   7. Post PHILIP the patient underwent synchronized electrical cardioversion   200 J x 1 attempt and converted to NSR.    < end of copied text >    < from: NM Nuclear Stress Pharmacologic Multiple (10.17.19 @ 14:27) >      < end of copied text >  < from: NM Nuclear Stress Pharmacologic Multiple (10.17.19 @ 14:27) >  Impression:  1. IV Adenosine Dual Isotope Study which was negative with respect to   symptoms and EKG changes.  2. Myocardial perfusion imaging reveals infarction in the distribution of   the left circumflex coronary artery as described above associated with   persistent dilatation of left ventricle  3. Gated imaging reveals severe hypokinesis and decreased thickening of   the inferolateral wall with ejection fraction moderately reduced at 4045%  Recommendation:  Medical therapy.    Risk factor modification.    Compare to previous coronary arteriography as well as interventional   reports.    No cardiac contraindications to general anesthesia at low to intermediate   risk    < end of copied text >    FINDINGS    Hemodynamics: Hemodynamic assessment demonstrates moderately elevated LVEDP.     Ventricles: EF calculated by contrast ventriculography was 45 %.     Coronary circulation: The coronary circulation is right dominant. There was 3-vessel coronary artery disease. Distal left main: There was a discrete 30- 40 % stenosis. LAD: The vessel was medium sized. Proximal LAD: Angiography showed minor luminal irregularities with no flow limiting lesions. Mid LAD: There was a tubular 60 % stenosis. iFR was performed and the lesion was not significant (iFR- 0.94). Distal LAD: Angiography showed no evidence of disease. 1st diagonal: Angiography showed mild atherosclerosis with no flow limiting lesions. Proximal circumflex: There was a 100 % stenosis. This lesion is a chronic total occlusion. RCA: The vessel was large sized (dominant). Proximal RCA: There was a discrete 90 % stenosis at a site with no prior intervention. There was SHERLY grade 3 flow through the vessel (brisk flow). This lesion is a likely culprit for the patient's anginal symptoms. Intervention was attempted but was unsuccessful. Mid RCA: Angiography showed no evidence of disease. Distal RCA: Angiography showed minor luminal irregularities with no flow limiting lesions. Right PDA: Normal. Right posterolateral segment: Normal.       INTERVENTION/ IMPLANTS: CASTILLO x 2 to proximal RCA       POST-OP DIAGNOSIS     There is significant double vessel coronary artery disease (RCA and LCX) s/p successful PCI of proximal RCA.       -OTHER:  EC Lead ECG:   Ventricular Rate 78 BPM    Atrial Rate 78 BPM    P-R Interval 178 ms    QRS Duration 102 ms    Q-T Interval 406 ms    QTC Calculation(Bazett) 462 ms    P Axis 53 degrees    R Axis -12 degrees    T Axis 42 degrees    Diagnosis Line Normal sinus rhythm  Cannot rule out Anterior infarct , age undetermined  Abnormal ECG    Confirmed by Monroe William ([id number]) on 8/10/2023 12:48:59 PM (08-10 @ 10:29)      TELEMETRY EVENTS: Sinus with PACs and blocked APCs

## 2023-08-10 NOTE — DIETITIAN INITIAL EVALUATION ADULT - ORAL INTAKE PTA/DIET HISTORY
Pt busy with other services at time of visit. Unable to obtain subjective history. Will obtain as able at follow up.

## 2023-08-10 NOTE — DISCHARGE NOTE PROVIDER - HOSPITAL COURSE
77-year-old male with a pmhx of HTN, HLD, CAD s/p 5 PCIs, DM, Afib s/p DCCV on xarelto presented to the ED from urgent care complaining of chest pain and dyspnea on exertion. Associated with one week of BL LE edema, sweating, and inability to sleep. Patient recently started on HRT 4 weeks ago. Patient admitted to medicine for further monitoring. On medicine floor, patient was put on remote tele. Cardiology was consulted and following patient. Patient underwent PHILIP/DCCV on 8/9 per cardio. EKG after that showed mobitz I and paroxysmal Afib. Patient was started on iv lasix and then switched to po lasix 40 bid. Patient has been asymptomatic, no complaints of cp, sob, palpitations, sweating or nausea. Stable for discharge and follow up cardiologist and EP outpatient. 77-year-old male with a pmhx of HTN, HLD, CAD s/p 5 PCIs, DM, Afib s/p DCCV on xarelto presented to the ED from urgent care complaining of chest pain and dyspnea on exertion.   Associated with one week of BL LE edema, sweating, and inability to sleep. Patient recently started on HRT 4 weeks ago. Patient admitted to medicine for further monitoring.   On medicine floor, patient was put on remote tele.   Cardiology was consulted and following patient. Patient underwent PHILIP/DCCV on 8/9 per cardio. Cardiology recommended EP consultation after the DCCV.  After DCCV pt had a irregular   heart beat with sinus    Patient was started on iv lasix and then switched to po lasix 40 bid. Patient has been asymptomatic, no complaints of cp, sob, palpitations, sweating or nausea.      Stable for discharge and follow up cardiologist and EP outpatient. 77-year-old male with a pmhx of HTN, HLD, CAD s/p 5 PCIs, DM, Afib s/p DCCV on xarelto presented to the ED from urgent care complaining of chest pain and dyspnea on exertion.   Associated with one week of BL LE edema, sweating, and inability to sleep. Patient recently started on HRT 4 weeks ago. Patient admitted to medicine for further monitoring.   On medicine floor, patient was put on remote tele. Patient was treated for acute on chronic HFpEF  Cardiology was consulted and following patient. Patient underwent PHILIP/DCCV on 8/9 per cardio. Cardiology recommended EP consultation after the DCCV.  After DCCV pt had a irregular   heart beat with sinus.  Electrophysiology department reported inpatient consultation was not needed and the patient could make an outpatient department.  Cardiology was notified and Dr. Monica Morales cardiology fellow reported that was ok with OP EP.  Cardiology did recommend starting multaq 400mg bid  Patient was started on iv lasix and then switched to po lasix 40 bid. Patient has been asymptomatic, no complaints of cp, sob, palpitations, sweating or nausea.      Stable for discharge and follow up cardiologist and EP outpatient. 77-year-old male with a pmhx of HTN, HLD, CAD s/p 5 PCIs, DM, Afib s/p DCCV on xarelto presented to the ED from urgent care complaining of chest pain and dyspnea on exertion.   Associated with one week of BL LE edema, sweating, and inability to sleep. Patient recently started on HRT 4 weeks ago. Patient admitted to medicine for further monitoring.   On medicine floor, patient was put on remote tele. Patient was treated for acute on chronic HFpEF  Cardiology was consulted and following patient. Patient underwent PHILIP/DCCV on 8/9 per cardio. Cardiology recommended EP consultation after the DCCV.  After DCCV pt had a irregular   heart beat with sinus.  Electrophysiology department did see the patient and recommended starting multaq 400mg bid  Patient was started on iv lasix and then switched to po lasix 40 bid. Patient has been asymptomatic, no complaints of cp, sob, palpitations, sweating or nausea.      Stable for discharge and follow up cardiologist and EP outpatient.

## 2023-08-10 NOTE — DIETITIAN INITIAL EVALUATION ADULT - PERTINENT MEDS FT
MEDICATIONS  (STANDING):  aspirin  chewable 81 milliGRAM(s) Oral daily  atorvastatin 80 milliGRAM(s) Oral at bedtime  dextrose 5%. 1000 milliLiter(s) (100 mL/Hr) IV Continuous <Continuous>  dextrose 5%. 1000 milliLiter(s) (50 mL/Hr) IV Continuous <Continuous>  dextrose 50% Injectable 25 Gram(s) IV Push once  dextrose 50% Injectable 12.5 Gram(s) IV Push once  dextrose 50% Injectable 25 Gram(s) IV Push once  dicyclomine 20 milliGRAM(s) Oral three times a day before meals  famotidine    Tablet 40 milliGRAM(s) Oral daily  fenofibrate Tablet 145 milliGRAM(s) Oral daily  finasteride 5 milliGRAM(s) Oral daily  furosemide    Tablet 40 milliGRAM(s) Oral two times a day  glucagon  Injectable 1 milliGRAM(s) IntraMuscular once  insulin lispro (ADMELOG) corrective regimen sliding scale   SubCutaneous three times a day before meals  levothyroxine 150 MICROGram(s) Oral daily  metoprolol tartrate 50 milliGRAM(s) Oral two times a day  pantoprazole    Tablet 40 milliGRAM(s) Oral before breakfast  rivaroxaban 20 milliGRAM(s) Oral with dinner  tamsulosin 0.4 milliGRAM(s) Oral at bedtime  valsartan 80 milliGRAM(s) Oral daily    MEDICATIONS  (PRN):  acetaminophen     Tablet .. 650 milliGRAM(s) Oral every 6 hours PRN Mild Pain (1 - 3), Moderate Pain (4 - 6)  dextrose Oral Gel 15 Gram(s) Oral once PRN Blood Glucose LESS THAN 70 milliGRAM(s)/deciliter  melatonin 3 milliGRAM(s) Oral at bedtime PRN Insomnia

## 2023-08-10 NOTE — DISCHARGE NOTE PROVIDER - CARE PROVIDERS DIRECT ADDRESSES
,DirectAddress_Unknown,[email or web address],DirectAddress_Unknown

## 2023-08-10 NOTE — DIETITIAN INITIAL EVALUATION ADULT - PERTINENT LABORATORY DATA
08-10    139  |  104  |  16  ----------------------------<  173<H>  4.1   |  23  |  1.0    Ca    8.7      10 Aug 2023 07:45  Mg     1.8     08-10    POCT Blood Glucose.: 147 mg/dL (08-10-23 @ 11:12)  A1C with Estimated Average Glucose Result: 7.3 % (08-08-23 @ 10:12)

## 2023-08-10 NOTE — DISCHARGE NOTE PROVIDER - ATTENDING DISCHARGE PHYSICAL EXAMINATION:
Vital Signs Last 24 Hrs  T(F): 98.5 (10 Aug 2023 15:32), Max: 98.5 (10 Aug 2023 15:32)  HR: 75 (10 Aug 2023 15:32) (71 - 80)  BP: 123/60 (10 Aug 2023 15:32) (120/56 - 160/76)  RR: 18 (10 Aug 2023 15:32) (18 - 20)  SpO2: 96% (10 Aug 2023 06:06) (96% - 96%)    PHYSICAL EXAM:  GENERAL: NAD, well-groomed, well-developed  HEAD:  Atraumatic, Normocephalic  EYES: EOMI, conjunctiva and sclera clear  ENMT: Moist mucous membranes, Good dentition, no thrush  NECK: Supple, No JVD  CHEST/LUNG: Clear to auscultation bilaterally, good air entry, non-labored breathing  HEART: IRR; S1/S2, 2/6 systolic murmur   ABDOMEN: Soft, Nontender, Nondistended; Bowel sounds present  VASCULAR: Normal pulses, Normal capillary refill  EXTREMITIES: No calf tenderness, No cyanosis, No edema  LYMPH: Normal; No lymphadenopathy noted  SKIN: Warm, Intact  PSYCH: Normal mood, Normal affect  NERVOUS SYSTEM:  A/O x3, Good concentration; CN 2-12 intact, No focal deficits

## 2023-08-10 NOTE — DISCHARGE NOTE PROVIDER - PROVIDER TOKENS
PROVIDER:[TOKEN:[[id number]]],PROVIDER:[TOKEN:[[id number]]],PROVIDER:[TOKEN:[[id number]]]

## 2023-08-10 NOTE — DIETITIAN INITIAL EVALUATION ADULT - ENERGY INTAKE
No po intake documented during admission.  PTA: unknown; likely adequate considering stable wt PTA  during admission: unknown

## 2023-08-10 NOTE — PROGRESS NOTE ADULT - PROVIDER SPECIALTY LIST ADULT
Hospitalist
Hospitalist
Internal Medicine
Internal Medicine
Hospitalist
Internal Medicine
Internal Medicine
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
## 2023-08-10 NOTE — DISCHARGE NOTE PROVIDER - CARE PROVIDER_API CALL
Camden Louis  Internal Medicine  [address] [place] [address]  Phone: [phone]  Fax: [phone]  Follow Up Time:     Jero Hall  Cardiac Electrophysiology  [address] [place] [id number]  Phone: [phone]  Fax: [phone]  Follow Up Time:     Nakul Steele  Cardiovascular Disease  [address] [place] [id number]  Phone: [phone]  Fax: [phone]  Follow Up Time:

## 2023-08-10 NOTE — PROGRESS NOTE ADULT - ASSESSMENT
77-year-old male with a pmhx of HTN, HLD, CAD s/p 5 PCIs, DM, Afib s/p DCCV on xarelto presented to the ED from urgent care complaining of chest pain and dyspnea on exertion. Associated with one week of BL LE edema, sweating, and inability to sleep. Patient recently started on HRT 4 weeks ago.       #Chest pain and dyspnea, 2/2 acute HFpEF  #h/o Afib with RVR s/p DCCV  #h/o CAD s/p 5 PCIs  - Last cath was in 2019 (dr Borrego) showed significant 2 vessel disease in the proximal LCx (100% occluded) and proximal RCA (90% occluded). had PCI to proximal RCA   - Had DCCV in 2020 successfully to NSR   - Loaded with asa 324 in urgent care. s/p 1 IV lasix 40   - on admission BNP 1655, trops 0.02-->0.02-->0.02  - CTA on admission: did not show a PE, showed BL GGO and septal thickening compatible with pulmonary edema. Dilated thoracic aortameasuring 4.4 cm, unchanged. Main pulmonary artery measures 3.6 cm, unchanged, which may be suggestive of pulmonary hypertension.  - EKG on admission showed T-wave inversion in lead III. Afib   - patient was on xarelto, switched to lovenox on admission  - TTE: unremarkable, EF>55%  - f/u duplex  - was on IV lasix 40 BID (patient home on 40 qd) -- switched to po lasix 40 bid on 8/9  - c/w po lasix 40 bid  - cardiology team following, s/p PHILIP/DCCV 8/9, anticoag switch back to xarelto per cardio  - on ecg 8/9 found to have Mobitz I, discussed with cardio, will monitor  - EP eval consulted  - monitor BMP daily  - c/w remote tele  - pulm consulted, will c/w diuresis, encourage bipap, patient follows Dr. Burk outpatient  - patient follows outpatient Dr. Steele at [phone], contacted    #HRT therapy  - hold off now - sweating and insomnia most likely 2/2 to HRT  - was on testosterone cypionate injection every 2 wks since 4 wks ago - received 2 doses  - follows Dr. Sheldon outpatient    #Hypomagnesemia  - daily bmp  - replete    #HTN   - cw with ASA, metoprolol    #HLD   - cw home meds     #DM   - monitor FS  - start insulin basal-bolus if >180 persistently     #h/o GERD  - at home on pepcid and omeprazole  - started on pepcid and protonix    #BPH   - cw home meds      MIsc;  DVT ppx: xarelto  GI ppx: protonix and pepcid   activity; AAT  Diet; Dash/CC- fluid restriction    77-year-old male with a pmhx of HTN, HLD, CAD s/p 5 PCIs, DM, Afib s/p DCCV on xarelto presented to the ED from urgent care complaining of chest pain and dyspnea on exertion. Associated with one week of BL LE edema, sweating, and inability to sleep. Patient recently started on HRT 4 weeks ago.       #Chest pain and dyspnea, 2/2 acute HFpEF  #h/o Afib with RVR s/p DCCV  #h/o CAD s/p 5 PCIs  #paroxysmal Afib  - Last cath was in 2019 (dr Borrego) showed significant 2 vessel disease in the proximal LCx (100% occluded) and proximal RCA (90% occluded). had PCI to proximal RCA   - Had DCCV in 2020 successfully to NSR   - Loaded with asa 324 in urgent care. s/p 1 IV lasix 40   - on admission BNP 1655, trops 0.02-->0.02-->0.02  - CTA on admission: did not show a PE, showed BL GGO and septal thickening compatible with pulmonary edema. Dilated thoracic aortameasuring 4.4 cm, unchanged. Main pulmonary artery measures 3.6 cm, unchanged, which may be suggestive of pulmonary hypertension.  - EKG on admission showed T-wave inversion in lead III. Afib   - TTE: unremarkable, EF>55%  - f/u duplex  - was on IV lasix 40 BID (patient home on 40 qd) -- switched to po lasix 40 bid on 8/9  - c/w po lasix 40 bid  - cardiology team following, s/p PHILIP/DCCV 8/9, anticoag switch back to xarelto per cardio  - on ecg 8/9 found to have Mobitz I, discussed with cardio, will monitor  - monitor BMP daily  - c/w remote tele  - pulm consulted, will c/w diuresis, encourage bipap, patient follows Dr. Burk outpatient  - patient follows outpatient Dr. Steele at [phone], contacted  - follow EP Dr. Hall outpatient    #HRT therapy  - hold off now - sweating and insomnia most likely 2/2 to HRT  - was on testosterone cypionate injection every 2 wks since 4 wks ago - received 2 doses  - follows Dr. Sheldon outpatient    #Hypomagnesemia  - daily bmp  - replete    #HTN   - cw with ASA, metoprolol    #HLD   - cw home meds     #DM   - monitor FS  - start insulin basal-bolus if >180 persistently     #h/o GERD  - at home on pepcid and omeprazole  - started on pepcid and protonix    #BPH   - cw home meds      MIsc;  DVT ppx: xarelto  GI ppx: protonix and pepcid   activity; AAT  Diet; Dash/CC- fluid restriction

## 2023-08-10 NOTE — DISCHARGE NOTE PROVIDER - NSDCCPCAREPLAN_GEN_ALL_CORE_FT
PRINCIPAL DISCHARGE DIAGNOSIS  Diagnosis: Chest pain  Assessment and Plan of Treatment: You were found to have afib and your heart was beating very fast causing you chest pain.  You were seen by cardiology, who recommended a cardioversion.  The cardioversion was done 8/9.  Afterwards you had some heart irregularity and therefore started on multaq.  It is recommended you make an appointment with the listed electrophysiologist cardiologist.

## 2023-08-10 NOTE — DISCHARGE NOTE PROVIDER - NSDCMRMEDTOKEN_GEN_ALL_CORE_FT
dicyclomine 20 mg oral tablet: 3 times a day  famotidine 40 mg oral tablet: 1 tab(s) orally once a day (at bedtime)  fenofibrate 145 mg oral tablet:   finasteride 5 mg oral tablet: 1 orally once a day  Flomax 0.4 mg oral capsule: 1 cap(s) orally once a day  Lasix 40 mg oral tablet: 1 tab(s) orally once a day  levothyroxine 150 mcg (0.15 mg) oral tablet:   metFORMIN 500 mg oral tablet: 1 tab(s) orally 2 times a day  HOLD METFORMIN FOR 48 HRS AFTER CARDIAC CATH.  metoprolol tartrate 50 mg oral tablet: 1 tab(s) orally 2 times a day  omeprazole 40 mg oral delayed release capsule: 1 cap(s) orally once a day  rosuvastatin 40 mg oral tablet: 1 tab(s) orally once a day  traMADol 50 mg oral tablet: 1 orally 2 times a day  valsartan 80 mg oral tablet: 2 tablets daily  Xarelto 20 mg oral tablet: 1 tab(s) orally once a day (in the evening)   aspirin 81 mg oral tablet, chewable: 1 tab(s) orally once a day  dicyclomine 20 mg oral tablet: 3 times a day  famotidine 40 mg oral tablet: 1 tab(s) orally once a day (at bedtime)  fenofibrate 145 mg oral tablet:   finasteride 5 mg oral tablet: 1 orally once a day  Flomax 0.4 mg oral capsule: 1 cap(s) orally once a day  Lasix 40 mg oral tablet: 1 tab(s) orally once a day  levothyroxine 150 mcg (0.15 mg) oral tablet:   metFORMIN 500 mg oral tablet: 1 tab(s) orally 2 times a day  HOLD METFORMIN FOR 48 HRS AFTER CARDIAC CATH.  metoprolol tartrate 50 mg oral tablet: 1 tab(s) orally 2 times a day  omeprazole 40 mg oral delayed release capsule: 1 cap(s) orally once a day  rosuvastatin 40 mg oral tablet: 1 tab(s) orally once a day  traMADol 50 mg oral tablet: 1 orally 2 times a day  valsartan 80 mg oral tablet: 2 tablets daily  Xarelto 20 mg oral tablet: 1 tab(s) orally once a day (in the evening)   aspirin 81 mg oral tablet, chewable: 1 tab(s) orally once a day  dicyclomine 20 mg oral tablet: 3 times a day  famotidine 40 mg oral tablet: 1 tab(s) orally once a day (at bedtime)  fenofibrate 145 mg oral tablet:   finasteride 5 mg oral tablet: 1 orally once a day  Flomax 0.4 mg oral capsule: 1 cap(s) orally once a day  Lasix 40 mg oral tablet: 1 tab(s) orally once a day  levothyroxine 150 mcg (0.15 mg) oral tablet:   metFORMIN 500 mg oral tablet: 1 tab(s) orally 2 times a day  HOLD METFORMIN FOR 48 HRS AFTER CARDIAC CATH.  metoprolol tartrate 50 mg oral tablet: 1 tab(s) orally 2 times a day  Multaq 400 mg oral tablet: 1 tab(s) orally 2 times a day  omeprazole 40 mg oral delayed release capsule: 1 cap(s) orally once a day  rosuvastatin 40 mg oral tablet: 1 tab(s) orally once a day  traMADol 50 mg oral tablet: 1 orally 2 times a day  valsartan 80 mg oral tablet: 2 tablets daily  Xarelto 20 mg oral tablet: 1 tab(s) orally once a day (in the evening)

## 2023-08-10 NOTE — CONSULT NOTE ADULT - ASSESSMENT
77-year-old male with a pmhx of HTN, HLD, CAD s/p 5 PCIs, DM, Afib s/p DCCV on xarelto presented to the ED from urgent care complaining of chest pain and dyspnea on exertion. Found to have A Fib with RVR and HFpEF exacerbation. S/p diuresis and PHILIP/CV. Transient episodes of LBBB and AIVR noted.    IMPRESSIONS  Paroxysmal A Fib s/p PHILIP/CV now sinus with PACs and blocked APCs  AIVR, transient LBBB  CAD s/p PCI mos recent 2019  HTN, HLD, DM  HFpEF    RECOMMENDATIONS  c/w lopressor 50mg BID  c/w Xarelto 20mg qd  start Multaq 400mg BID  cw Synthroid, TSH noted, check free T4, outpt endocrine f/u  recommend ischemic w/up given AIVR, can consider NST outpatient  counselled to get CPAP for ETELVINA, pt will f/u with Dr Burk outpatient   outpatient f/u with Dr Hall   77-year-old male with a pmhx of HTN, HLD, CAD s/p 5 PCIs, DM, Afib s/p DCCV on xarelto presented to the ED from urgent care complaining of chest pain and dyspnea on exertion. Found to have A Fib with RVR and HFpEF exacerbation. S/p diuresis and PHILIP/CV. Transient episodes of LBBB and AIVR noted.    IMPRESSIONS  Paroxysmal A Fib s/p PHILIP/CV now sinus with PACs and blocked APCs  AIVR, transient LBBB  CAD s/p PCI mos recent 2019  HTN, HLD, DM  HFpEF    RECOMMENDATIONS  c/w lopressor 50mg BID  c/w Xarelto 20mg qd  start Multaq 400mg BID  cw Synthroid, TSH noted, check free T4, outpt endocrine f/u  recommend ischemic w/up given AIVR,  counselled to get CPAP for ETELVINA, pt will f/u with Dr Burk outpatient   outpatient f/u with Dr Hall

## 2023-08-10 NOTE — DIETITIAN INITIAL EVALUATION ADULT - OTHER INFO
77-year-old male with a pmhx of HTN, HLD, CAD s/p 5 PCIs, DM, Afib s/p DCCV on xarelto presented to the ED from urgent care complaining of chest pain and dyspnea on exertion. Patient said he has been sweating profusely since one week, and unable to sleep with increasing LE edema BL. Today patient started having chest pressure in the middle of the chest, nonradiating, patient describes its different from previous heart attacks, and felt tapia when going upstairs. He went to  were they loaded with asprin and chest pain resolved, he was found to be in Afib and was sent to the ED. Denies nausea, vomiting, or other complaints.  pt mentions to have been on HRT over the past 4 weeks.

## 2023-08-10 NOTE — DIETITIAN INITIAL EVALUATION ADULT - ADD RECOMMEND
- continue insulin regimen, adjust prn per team to promote euglycemia  - continue furosemide per team; monitor & replete electrolytes prn     Patient is at high Risk, follow up x 3-5 days  Emily Jones, RD  #[id number] or via TEAMS

## 2023-08-10 NOTE — CONSULT NOTE ADULT - ATTENDING COMMENTS
Mr. Mcnally was seen and examined at bedside today with his wife present, the patient was initially admitted for acute decompensated heart failure with pulmonary edema seen on his chest CT.  Patient requested evaluation by the pulmonary team for this, and Mr Mcnally is a patient of Dr. Burk.  Recommend ongoing diuresis as needed per primary team to reach euvolemia, at the time of my exam there does not appear to be any residual pulmonary edema as the patient has no crackles on exam.  He does continue to have some +1 to +2 bilateral lower extremity edema.  He should also resume on his home CPAP settings, I recommended that his wife bring in his CPAP machine for him to use overnight.  Continue outpatient inhalers while admitted.  Patient should follow-up after discharge with Dr. Burk.  I agree with the fellow note, with the exceptions listed in my attestation above.  The remainder of impression and plan per fellow note.
Patient seen / examined and plan amended above.    Resume Xarelto after PHILIP/CV  Outpatient follow up with Dr. Steele  Outpatient EP evaluation for ablation
complex patient  agree with above

## 2023-08-11 ENCOUNTER — TRANSCRIPTION ENCOUNTER (OUTPATIENT)
Age: 78
End: 2023-08-11

## 2023-08-12 LAB
ALBUMIN SERPL ELPH-MCNC: 4.2 G/DL
ALP BLD-CCNC: 48 U/L
ALT SERPL-CCNC: 41 U/L
ANION GAP SERPL CALC-SCNC: 16 MMOL/L
AST SERPL-CCNC: 31 U/L
BILIRUB SERPL-MCNC: 0.6 MG/DL
BUN SERPL-MCNC: 15 MG/DL
CALCIUM SERPL-MCNC: 9.3 MG/DL
CHLORIDE SERPL-SCNC: 103 MMOL/L
CO2 SERPL-SCNC: 21 MMOL/L
CREAT SERPL-MCNC: 0.9 MG/DL
EGFR: 89 ML/MIN/1.73M2
GLUCOSE SERPL-MCNC: 130 MG/DL
POTASSIUM SERPL-SCNC: 4.1 MMOL/L
PROT SERPL-MCNC: 6.6 G/DL
SODIUM SERPL-SCNC: 140 MMOL/L

## 2023-08-14 ENCOUNTER — TRANSCRIPTION ENCOUNTER (OUTPATIENT)
Age: 78
End: 2023-08-14

## 2023-08-14 ENCOUNTER — APPOINTMENT (OUTPATIENT)
Age: 78
End: 2023-08-14
Payer: MEDICARE

## 2023-08-14 VITALS
HEART RATE: 70 BPM | RESPIRATION RATE: 18 BRPM | DIASTOLIC BLOOD PRESSURE: 64 MMHG | SYSTOLIC BLOOD PRESSURE: 122 MMHG | OXYGEN SATURATION: 98 %

## 2023-08-14 PROCEDURE — 99495 TRANSJ CARE MGMT MOD F2F 14D: CPT

## 2023-08-14 RX ORDER — AMLODIPINE BESYLATE 2.5 MG/1
2.5 TABLET ORAL DAILY
Qty: 90 | Refills: 3 | Status: DISCONTINUED | COMMUNITY
Start: 2023-06-02 | End: 2023-08-14

## 2023-08-14 NOTE — ASSESSMENT
[FreeTextEntry1] : Patient is a 77 year old male enrolled in the \[facility] [place] [facility]\"" TCM program s/p a recent discharge from [facility] 8/6-8/10 for CHF/Afib. PMH HTN, HLD, CAD s/p 5 PCIs, DM, Afib s/p DCCV on xarelto . Patient was diuresed and sent home with HCS. HCS in place. Upon arrival patient alert in nad, denies cp, sob, fever, chills, n/v/d, cough. Reports slight edema. F/U appointments pcp 9/15, echo/dopppler 8/15, cardio 9/6, and wife to schedule eps. Patient was contacted by Joyce Mccabe RN from TCM and medication reconciliation was done with in 48 hours of discharge 8/11.

## 2023-08-14 NOTE — PHYSICAL EXAM
[No Acute Distress] : no acute distress [Well Developed] : well developed [Well-Appearing] : well-appearing [Normal Sclera/Conjunctiva] : normal sclera/conjunctiva [Normal Outer Ear/Nose] : the outer ears and nose were normal in appearance [No Respiratory Distress] : no respiratory distress  [No Accessory Muscle Use] : no accessory muscle use [Normal Rate] : normal rate  [Irregularly Irregular] : irregularly irregular [Soft] : abdomen soft [Non Tender] : non-tender [Normal] : no joint swelling and grossly normal strength and tone [Normal Affect] : the affect was normal [Alert and Oriented x3] : oriented to person, place, and time [Normal Mood] : the mood was normal [de-identified] : trace edema b/l le

## 2023-08-14 NOTE — HISTORY OF PRESENT ILLNESS
[Spouse] : spouse [FreeTextEntry1] : F/U hospital discharge for CHF/Afib. [de-identified] : Patient is a 77 year old male enrolled in the [facility] TCM program s/p a recent discharge from [facility] 8/6-8/10 for CHF/Afib. PMH HTN, HLD, CAD s/p 5 PCIs, DM, Afib s/p DCCV on xarelto . Patient was diuresed and sent home with HCS. HCS in place. Upon arrival patient alert in nad, denies cp, sob, fever, chills, n/v/d, cough. Reports slight edema. F/U appointments pcp 9/15, echo/dopppler 8/15, cardio 9/6, and wife to schedule eps. Patient was contacted by Joyce Mccabe RN from TCM and medication reconciliation was done with in 48 hours of discharge 8/11.  Copied from [facility]: Hospital Course: 77-year-old male with a pmhx of HTN, HLD, CAD s/p 5 PCIs, DM, Afib s/p DCCV on xarelto presented to the ED from urgent care complaining of chest pain and dyspnea on exertion.  Associated with one week of BL LE edema, sweating, and inability to sleep. Patient recently started on HRT 4 weeks ago. Patient admitted to medicine for further monitoring.  On medicine floor, patient was put on remote tele. Patient was treated for acute on chronic HFpEF Cardiology was consulted and following patient. Patient underwent PHILIP/DCCV on 8/9 per cardio. Cardiology recommended EP consultation after the DCCV. After DCCV pt had a irregular  heart beat with sinus. Electrophysiology department did see the patient and recommended starting multaq 400mg bid Patient was started on iv lasix and then switched to po lasix 40 bid. Patient has been asymptomatic, no complaints of cp, sob, palpitations, sweating or nausea.   Stable for discharge and follow up cardiologist and EP outpatient.

## 2023-08-14 NOTE — PLAN
[FreeTextEntry1] : CHF:c/w lasix.low salt/sodium diet. dietary modifications reviewed. f/u cardio.  Afib:c/w xarelto/multaq. f/u cardio.  HLD: c/w statin. f/u cardio.

## 2023-08-15 ENCOUNTER — APPOINTMENT (OUTPATIENT)
Dept: CARDIOLOGY | Facility: CLINIC | Age: 78
End: 2023-08-15
Payer: MEDICARE

## 2023-08-15 PROCEDURE — 93880 EXTRACRANIAL BILAT STUDY: CPT

## 2023-08-15 PROCEDURE — 93306 TTE W/DOPPLER COMPLETE: CPT

## 2023-08-16 DIAGNOSIS — N40.0 BENIGN PROSTATIC HYPERPLASIA WITHOUT LOWER URINARY TRACT SYMPTOMS: ICD-10-CM

## 2023-08-16 DIAGNOSIS — I25.2 OLD MYOCARDIAL INFARCTION: ICD-10-CM

## 2023-08-16 DIAGNOSIS — E66.9 OBESITY, UNSPECIFIED: ICD-10-CM

## 2023-08-16 DIAGNOSIS — Z87.442 PERSONAL HISTORY OF URINARY CALCULI: ICD-10-CM

## 2023-08-16 DIAGNOSIS — Z95.5 PRESENCE OF CORONARY ANGIOPLASTY IMPLANT AND GRAFT: ICD-10-CM

## 2023-08-16 DIAGNOSIS — E11.65 TYPE 2 DIABETES MELLITUS WITH HYPERGLYCEMIA: ICD-10-CM

## 2023-08-16 DIAGNOSIS — Z79.01 LONG TERM (CURRENT) USE OF ANTICOAGULANTS: ICD-10-CM

## 2023-08-16 DIAGNOSIS — Z86.718 PERSONAL HISTORY OF OTHER VENOUS THROMBOSIS AND EMBOLISM: ICD-10-CM

## 2023-08-16 DIAGNOSIS — E78.00 PURE HYPERCHOLESTEROLEMIA, UNSPECIFIED: ICD-10-CM

## 2023-08-16 DIAGNOSIS — I25.10 ATHEROSCLEROTIC HEART DISEASE OF NATIVE CORONARY ARTERY WITHOUT ANGINA PECTORIS: ICD-10-CM

## 2023-08-16 DIAGNOSIS — E83.42 HYPOMAGNESEMIA: ICD-10-CM

## 2023-08-16 DIAGNOSIS — K21.9 GASTRO-ESOPHAGEAL REFLUX DISEASE WITHOUT ESOPHAGITIS: ICD-10-CM

## 2023-08-16 DIAGNOSIS — I44.7 LEFT BUNDLE-BRANCH BLOCK, UNSPECIFIED: ICD-10-CM

## 2023-08-16 DIAGNOSIS — G89.29 OTHER CHRONIC PAIN: ICD-10-CM

## 2023-08-16 DIAGNOSIS — I48.0 PAROXYSMAL ATRIAL FIBRILLATION: ICD-10-CM

## 2023-08-16 DIAGNOSIS — I10 ESSENTIAL (PRIMARY) HYPERTENSION: ICD-10-CM

## 2023-08-16 DIAGNOSIS — E03.9 HYPOTHYROIDISM, UNSPECIFIED: ICD-10-CM

## 2023-08-16 DIAGNOSIS — G47.33 OBSTRUCTIVE SLEEP APNEA (ADULT) (PEDIATRIC): ICD-10-CM

## 2023-08-16 DIAGNOSIS — I27.20 PULMONARY HYPERTENSION, UNSPECIFIED: ICD-10-CM

## 2023-08-16 DIAGNOSIS — I11.0 HYPERTENSIVE HEART DISEASE WITH HEART FAILURE: ICD-10-CM

## 2023-08-16 DIAGNOSIS — I50.33 ACUTE ON CHRONIC DIASTOLIC (CONGESTIVE) HEART FAILURE: ICD-10-CM

## 2023-08-18 ENCOUNTER — NON-APPOINTMENT (OUTPATIENT)
Age: 78
End: 2023-08-18

## 2023-08-22 ENCOUNTER — TRANSCRIPTION ENCOUNTER (OUTPATIENT)
Age: 78
End: 2023-08-22

## 2023-08-28 ENCOUNTER — TRANSCRIPTION ENCOUNTER (OUTPATIENT)
Age: 78
End: 2023-08-28

## 2023-09-06 ENCOUNTER — APPOINTMENT (OUTPATIENT)
Dept: CARDIOLOGY | Facility: CLINIC | Age: 78
End: 2023-09-06
Payer: MEDICARE

## 2023-09-06 VITALS
DIASTOLIC BLOOD PRESSURE: 72 MMHG | SYSTOLIC BLOOD PRESSURE: 132 MMHG | WEIGHT: 274 LBS | HEART RATE: 67 BPM | HEIGHT: 71 IN | BODY MASS INDEX: 38.36 KG/M2

## 2023-09-06 PROCEDURE — 93000 ELECTROCARDIOGRAM COMPLETE: CPT

## 2023-09-06 PROCEDURE — 99214 OFFICE O/P EST MOD 30 MIN: CPT

## 2023-09-07 NOTE — HISTORY OF PRESENT ILLNESS
[FreeTextEntry1] : Ms. Mcnally is a 77-year-old man with history of CAD (PCI RCA 2019, PCI LAD 2001; LCx 100%), paroxysmal AF on DOAC, HTN, HLD, hypothyroidism, and ETELVINA presenting for follow up.  Patient previously followed with Dr. Rizzo and was last seen by him in office 9/2022. On our first visit reported overall feeling well without well, without recent cardiopulmonary complaints. Has chronic back and lower extremity joint discomfort.  8/2023 was admitted for evaluation of chest discomfort and dyspnea on exertion along with bilateral lower extremity edema in the context of starting HRT 4 weeks prior. He was noted to be in rapid AF in the setting of acute on chronic diastolic heart failure. He underwent DCCV and was started on dronedarone 400mg BID.  Today feels much improved. No further symptomatic episodes of AF.  TTE 9/2022 - Normal systolic function, posterobasal hypokinesis, severe LA dilatation, mild MR, mild AI, mild Ao sclerosis, in NSR TTE 8/2023 - EF 55-60%, G1DD, moderate LA dilatation, fibrocalcific Ao  LHC 12/2019 - Significant double vessel CAD, RCA and LCx, s/p PCI of pRCA. - 60% mLAD iFR 0.94 - prox LCx 100% (?)  Carotid Duplex 2023 - Bilateral <50% ICA stenoses - L ECA significant stenosis  NM Stress 2019 - Moderate-to-large in size, severe in severity inferolateral defect with minimal reversibility  Labs - Hgb 12.7, Plt 127 - Cr 1.16, K 4.7 - , , HDL 37, LDL 48 - A1c 6.9% (max 7.8%), TSH 1.07, pBNP 201 (<586)  Meds: - Xarelto 20mg daily - Amlodipine 2.5mg - Valsartan 160mg - Metoprolol 50mg BID - Rosuvastatin 40mg - Fenofibrate 145mg daily - Dronedarone 400mg BID - Furosemide 40mg daily - Synthroid 150mcg

## 2023-09-07 NOTE — ASSESSMENT
[FreeTextEntry1] : Ms. Mcnally is a 78-year-old man with history of CAD (PCI RCA 2019, LAD 2001; LCx 100%), paroxysmal AF on DOAC, HTN, HLD, hypothyroidism, and ETELVINA presenting for follow up.  Impression: (1) CAD s/p PCI as above, stable (2) Paroxysmal AF, CHADSVASc at least 4 (Age +2, HTN, CAD), on Rivaroxaban and dronedarone (3) HTN, well controlled today (4) HLD, goal LDL <70, last 48 (5) ETELVINA  Plan: - Reviewed hospital course and discussed findings with patient in detail. - Follow up with EP tomorrow to discuss ablation. OK to continue dronedarone for now. - If planned for ablation would obtain NM stress beforehand. Patient has a known 60% mLAD stenosis from [facility] in 2019 (iFR negative at that time). Presently denies chest discomfort or dyspnea while in sinus rhythm. - Continue DOAC monotherapy  RTC 1 month

## 2023-09-15 ENCOUNTER — APPOINTMENT (OUTPATIENT)
Dept: ELECTROPHYSIOLOGY | Facility: CLINIC | Age: 78
End: 2023-09-15
Payer: MEDICARE

## 2023-09-15 VITALS
HEIGHT: 71 IN | HEART RATE: 71 BPM | DIASTOLIC BLOOD PRESSURE: 77 MMHG | SYSTOLIC BLOOD PRESSURE: 131 MMHG | BODY MASS INDEX: 38.36 KG/M2 | TEMPERATURE: 98 F | WEIGHT: 274 LBS

## 2023-09-15 PROCEDURE — 99214 OFFICE O/P EST MOD 30 MIN: CPT

## 2023-09-15 PROCEDURE — 93000 ELECTROCARDIOGRAM COMPLETE: CPT

## 2023-09-15 RX ORDER — FENOFIBRATE 145 MG/1
145 TABLET, COATED ORAL DAILY
Refills: 0 | Status: COMPLETED | COMMUNITY
End: 2023-09-15

## 2023-09-15 RX ORDER — DRONEDARONE 400 MG/1
400 TABLET, FILM COATED ORAL TWICE DAILY
Qty: 180 | Refills: 3 | Status: ACTIVE | COMMUNITY
Start: 2023-08-11

## 2023-09-15 RX ORDER — FAMOTIDINE 40 MG/1
40 TABLET, FILM COATED ORAL DAILY
Refills: 0 | Status: ACTIVE | COMMUNITY

## 2023-09-15 RX ORDER — OMEPRAZOLE 40 MG/1
40 CAPSULE, DELAYED RELEASE ORAL DAILY
Refills: 0 | Status: ACTIVE | COMMUNITY

## 2023-09-15 RX ORDER — ACETAMINOPHEN 325 MG/1
325 TABLET ORAL
Refills: 0 | Status: ACTIVE | COMMUNITY

## 2023-09-15 RX ORDER — PENICILLIN V POTASSIUM 500 MG/1
500 TABLET, FILM COATED ORAL
Refills: 0 | Status: ACTIVE | COMMUNITY

## 2023-09-15 RX ORDER — LEVOTHYROXINE SODIUM 0.15 MG/1
150 TABLET ORAL DAILY
Refills: 0 | Status: ACTIVE | COMMUNITY

## 2023-09-15 RX ORDER — DRONEDARONE 400 MG/1
400 TABLET, FILM COATED ORAL TWICE DAILY
Refills: 0 | Status: COMPLETED | COMMUNITY
End: 2023-09-15

## 2023-09-15 RX ORDER — PREGABALIN 75 MG/1
75 CAPSULE ORAL
Refills: 0 | Status: COMPLETED | COMMUNITY
End: 2023-09-15

## 2023-09-15 RX ORDER — TAMSULOSIN HYDROCHLORIDE 0.4 MG/1
0.4 CAPSULE ORAL DAILY
Refills: 0 | Status: ACTIVE | COMMUNITY

## 2023-09-15 RX ORDER — DICYCLOMINE HYDROCHLORIDE 20 MG/1
20 TABLET ORAL 3 TIMES DAILY
Refills: 0 | Status: ACTIVE | COMMUNITY

## 2023-09-15 RX ORDER — METFORMIN HYDROCHLORIDE 1000 MG/1
1000 TABLET, COATED ORAL
Qty: 60 | Refills: 0 | Status: ACTIVE | COMMUNITY

## 2023-09-29 ENCOUNTER — RX RENEWAL (OUTPATIENT)
Age: 78
End: 2023-09-29

## 2023-09-29 RX ORDER — METOPROLOL TARTRATE 50 MG/1
50 TABLET, FILM COATED ORAL
Qty: 180 | Refills: 3 | Status: ACTIVE | COMMUNITY
Start: 2020-12-09 | End: 1900-01-01

## 2023-10-02 ENCOUNTER — APPOINTMENT (OUTPATIENT)
Dept: PULMONOLOGY | Facility: CLINIC | Age: 78
End: 2023-10-02
Payer: MEDICARE

## 2023-10-02 VITALS
DIASTOLIC BLOOD PRESSURE: 72 MMHG | HEART RATE: 76 BPM | RESPIRATION RATE: 14 BRPM | HEIGHT: 71 IN | WEIGHT: 268 LBS | SYSTOLIC BLOOD PRESSURE: 134 MMHG | BODY MASS INDEX: 37.52 KG/M2 | OXYGEN SATURATION: 96 %

## 2023-10-02 DIAGNOSIS — J45.909 UNSPECIFIED ASTHMA, UNCOMPLICATED: ICD-10-CM

## 2023-10-02 PROCEDURE — 99214 OFFICE O/P EST MOD 30 MIN: CPT

## 2023-10-09 ENCOUNTER — EMERGENCY (EMERGENCY)
Facility: HOSPITAL | Age: 78
LOS: 0 days | Discharge: HOME | End: 2023-10-09
Attending: EMERGENCY MEDICINE
Payer: MEDICARE

## 2023-10-09 VITALS
HEART RATE: 62 BPM | SYSTOLIC BLOOD PRESSURE: 138 MMHG | DIASTOLIC BLOOD PRESSURE: 70 MMHG | RESPIRATION RATE: 18 BRPM | OXYGEN SATURATION: 99 %

## 2023-10-09 VITALS
DIASTOLIC BLOOD PRESSURE: 62 MMHG | OXYGEN SATURATION: 98 % | HEART RATE: 75 BPM | RESPIRATION RATE: 18 BRPM | WEIGHT: 265 LBS | HEIGHT: 71 IN | TEMPERATURE: 98 F | SYSTOLIC BLOOD PRESSURE: 132 MMHG

## 2023-10-09 DIAGNOSIS — N40.0 BENIGN PROSTATIC HYPERPLASIA WITHOUT LOWER URINARY TRACT SYMPTOMS: ICD-10-CM

## 2023-10-09 DIAGNOSIS — M54.50 LOW BACK PAIN, UNSPECIFIED: ICD-10-CM

## 2023-10-09 DIAGNOSIS — Z98.41 CATARACT EXTRACTION STATUS, RIGHT EYE: ICD-10-CM

## 2023-10-09 DIAGNOSIS — Z98.42 CATARACT EXTRACTION STATUS, LEFT EYE: ICD-10-CM

## 2023-10-09 DIAGNOSIS — I25.2 OLD MYOCARDIAL INFARCTION: ICD-10-CM

## 2023-10-09 DIAGNOSIS — Z98.890 OTHER SPECIFIED POSTPROCEDURAL STATES: Chronic | ICD-10-CM

## 2023-10-09 DIAGNOSIS — I10 ESSENTIAL (PRIMARY) HYPERTENSION: ICD-10-CM

## 2023-10-09 DIAGNOSIS — N20.0 CALCULUS OF KIDNEY: Chronic | ICD-10-CM

## 2023-10-09 DIAGNOSIS — K21.9 GASTRO-ESOPHAGEAL REFLUX DISEASE WITHOUT ESOPHAGITIS: ICD-10-CM

## 2023-10-09 DIAGNOSIS — I25.10 ATHEROSCLEROTIC HEART DISEASE OF NATIVE CORONARY ARTERY WITHOUT ANGINA PECTORIS: Chronic | ICD-10-CM

## 2023-10-09 DIAGNOSIS — G89.29 OTHER CHRONIC PAIN: ICD-10-CM

## 2023-10-09 DIAGNOSIS — I25.10 ATHEROSCLEROTIC HEART DISEASE OF NATIVE CORONARY ARTERY WITHOUT ANGINA PECTORIS: ICD-10-CM

## 2023-10-09 DIAGNOSIS — Z95.5 PRESENCE OF CORONARY ANGIOPLASTY IMPLANT AND GRAFT: ICD-10-CM

## 2023-10-09 DIAGNOSIS — Z79.01 LONG TERM (CURRENT) USE OF ANTICOAGULANTS: ICD-10-CM

## 2023-10-09 DIAGNOSIS — E78.5 HYPERLIPIDEMIA, UNSPECIFIED: ICD-10-CM

## 2023-10-09 DIAGNOSIS — Z79.82 LONG TERM (CURRENT) USE OF ASPIRIN: ICD-10-CM

## 2023-10-09 DIAGNOSIS — E03.9 HYPOTHYROIDISM, UNSPECIFIED: ICD-10-CM

## 2023-10-09 DIAGNOSIS — Z98.49 CATARACT EXTRACTION STATUS, UNSPECIFIED EYE: Chronic | ICD-10-CM

## 2023-10-09 DIAGNOSIS — Z86.718 PERSONAL HISTORY OF OTHER VENOUS THROMBOSIS AND EMBOLISM: ICD-10-CM

## 2023-10-09 DIAGNOSIS — Z87.440 PERSONAL HISTORY OF URINARY (TRACT) INFECTIONS: ICD-10-CM

## 2023-10-09 DIAGNOSIS — E78.00 PURE HYPERCHOLESTEROLEMIA, UNSPECIFIED: ICD-10-CM

## 2023-10-09 DIAGNOSIS — I48.91 UNSPECIFIED ATRIAL FIBRILLATION: ICD-10-CM

## 2023-10-09 DIAGNOSIS — E11.9 TYPE 2 DIABETES MELLITUS WITHOUT COMPLICATIONS: ICD-10-CM

## 2023-10-09 DIAGNOSIS — Z79.84 LONG TERM (CURRENT) USE OF ORAL HYPOGLYCEMIC DRUGS: ICD-10-CM

## 2023-10-09 DIAGNOSIS — Z88.1 ALLERGY STATUS TO OTHER ANTIBIOTIC AGENTS STATUS: ICD-10-CM

## 2023-10-09 LAB
ALBUMIN SERPL ELPH-MCNC: 3.9 G/DL — SIGNIFICANT CHANGE UP (ref 3.5–5.2)
ALP SERPL-CCNC: 47 U/L — SIGNIFICANT CHANGE UP (ref 30–115)
ALT FLD-CCNC: 20 U/L — SIGNIFICANT CHANGE UP (ref 0–41)
ANION GAP SERPL CALC-SCNC: 9 MMOL/L — SIGNIFICANT CHANGE UP (ref 7–14)
APPEARANCE UR: CLEAR — SIGNIFICANT CHANGE UP
AST SERPL-CCNC: 17 U/L — SIGNIFICANT CHANGE UP (ref 0–41)
BACTERIA # UR AUTO: ABNORMAL /HPF
BASOPHILS # BLD AUTO: 0.01 K/UL — SIGNIFICANT CHANGE UP (ref 0–0.2)
BASOPHILS NFR BLD AUTO: 0.2 % — SIGNIFICANT CHANGE UP (ref 0–1)
BILIRUB SERPL-MCNC: 0.3 MG/DL — SIGNIFICANT CHANGE UP (ref 0.2–1.2)
BILIRUB UR-MCNC: NEGATIVE — SIGNIFICANT CHANGE UP
BUN SERPL-MCNC: 18 MG/DL — SIGNIFICANT CHANGE UP (ref 10–20)
CALCIUM SERPL-MCNC: 9.5 MG/DL — SIGNIFICANT CHANGE UP (ref 8.4–10.5)
CHLORIDE SERPL-SCNC: 101 MMOL/L — SIGNIFICANT CHANGE UP (ref 98–110)
CO2 SERPL-SCNC: 26 MMOL/L — SIGNIFICANT CHANGE UP (ref 17–32)
COLOR SPEC: YELLOW — SIGNIFICANT CHANGE UP
CREAT SERPL-MCNC: 1.1 MG/DL — SIGNIFICANT CHANGE UP (ref 0.7–1.5)
DIFF PNL FLD: NEGATIVE — SIGNIFICANT CHANGE UP
EGFR: 70 ML/MIN/1.73M2 — SIGNIFICANT CHANGE UP
EOSINOPHIL # BLD AUTO: 0.04 K/UL — SIGNIFICANT CHANGE UP (ref 0–0.7)
EOSINOPHIL NFR BLD AUTO: 0.9 % — SIGNIFICANT CHANGE UP (ref 0–8)
EPI CELLS # UR: PRESENT
GLUCOSE SERPL-MCNC: 170 MG/DL — HIGH (ref 70–99)
GLUCOSE UR QL: NEGATIVE MG/DL — SIGNIFICANT CHANGE UP
HCT VFR BLD CALC: 35.2 % — LOW (ref 42–52)
HGB BLD-MCNC: 11.8 G/DL — LOW (ref 14–18)
IMM GRANULOCYTES NFR BLD AUTO: 0.2 % — SIGNIFICANT CHANGE UP (ref 0.1–0.3)
KETONES UR-MCNC: NEGATIVE MG/DL — SIGNIFICANT CHANGE UP
LACTATE SERPL-SCNC: 1.4 MMOL/L — SIGNIFICANT CHANGE UP (ref 0.7–2)
LEUKOCYTE ESTERASE UR-ACNC: ABNORMAL
LIDOCAIN IGE QN: 28 U/L — SIGNIFICANT CHANGE UP (ref 7–60)
LYMPHOCYTES # BLD AUTO: 1.07 K/UL — LOW (ref 1.2–3.4)
LYMPHOCYTES # BLD AUTO: 25.4 % — SIGNIFICANT CHANGE UP (ref 20.5–51.1)
MCHC RBC-ENTMCNC: 28.2 PG — SIGNIFICANT CHANGE UP (ref 27–31)
MCHC RBC-ENTMCNC: 33.5 G/DL — SIGNIFICANT CHANGE UP (ref 32–37)
MCV RBC AUTO: 84.2 FL — SIGNIFICANT CHANGE UP (ref 80–94)
MONOCYTES # BLD AUTO: 0.24 K/UL — SIGNIFICANT CHANGE UP (ref 0.1–0.6)
MONOCYTES NFR BLD AUTO: 5.7 % — SIGNIFICANT CHANGE UP (ref 1.7–9.3)
NEUTROPHILS # BLD AUTO: 2.85 K/UL — SIGNIFICANT CHANGE UP (ref 1.4–6.5)
NEUTROPHILS NFR BLD AUTO: 67.6 % — SIGNIFICANT CHANGE UP (ref 42.2–75.2)
NITRITE UR-MCNC: NEGATIVE — SIGNIFICANT CHANGE UP
NRBC # BLD: 0 /100 WBCS — SIGNIFICANT CHANGE UP (ref 0–0)
PH UR: 6.5 — SIGNIFICANT CHANGE UP (ref 5–8)
PLATELET # BLD AUTO: 108 K/UL — LOW (ref 130–400)
PMV BLD: 9.3 FL — SIGNIFICANT CHANGE UP (ref 7.4–10.4)
POTASSIUM SERPL-MCNC: 3.9 MMOL/L — SIGNIFICANT CHANGE UP (ref 3.5–5)
POTASSIUM SERPL-SCNC: 3.9 MMOL/L — SIGNIFICANT CHANGE UP (ref 3.5–5)
PROT SERPL-MCNC: 6.4 G/DL — SIGNIFICANT CHANGE UP (ref 6–8)
PROT UR-MCNC: NEGATIVE MG/DL — SIGNIFICANT CHANGE UP
RBC # BLD: 4.18 M/UL — LOW (ref 4.7–6.1)
RBC # FLD: 14.7 % — HIGH (ref 11.5–14.5)
RBC CASTS # UR COMP ASSIST: 1 /HPF — SIGNIFICANT CHANGE UP (ref 0–4)
SODIUM SERPL-SCNC: 136 MMOL/L — SIGNIFICANT CHANGE UP (ref 135–146)
SP GR SPEC: >1.03 — HIGH (ref 1–1.03)
SQUAMOUS # UR AUTO: 10 /HPF — HIGH (ref 0–5)
TROPONIN T SERPL-MCNC: <0.01 NG/ML — SIGNIFICANT CHANGE UP
UROBILINOGEN FLD QL: 1 MG/DL — SIGNIFICANT CHANGE UP (ref 0.2–1)
WBC # BLD: 4.22 K/UL — LOW (ref 4.8–10.8)
WBC # FLD AUTO: 4.22 K/UL — LOW (ref 4.8–10.8)
WBC UR QL: 17 /HPF — HIGH (ref 0–5)

## 2023-10-09 PROCEDURE — 71045 X-RAY EXAM CHEST 1 VIEW: CPT | Mod: 26

## 2023-10-09 PROCEDURE — 81001 URINALYSIS AUTO W/SCOPE: CPT

## 2023-10-09 PROCEDURE — 99285 EMERGENCY DEPT VISIT HI MDM: CPT | Mod: FS

## 2023-10-09 PROCEDURE — 74177 CT ABD & PELVIS W/CONTRAST: CPT | Mod: 26,MA

## 2023-10-09 PROCEDURE — 36415 COLL VENOUS BLD VENIPUNCTURE: CPT

## 2023-10-09 PROCEDURE — 71045 X-RAY EXAM CHEST 1 VIEW: CPT

## 2023-10-09 PROCEDURE — 83605 ASSAY OF LACTIC ACID: CPT

## 2023-10-09 PROCEDURE — 85025 COMPLETE CBC W/AUTO DIFF WBC: CPT

## 2023-10-09 PROCEDURE — 93010 ELECTROCARDIOGRAM REPORT: CPT

## 2023-10-09 PROCEDURE — 83690 ASSAY OF LIPASE: CPT

## 2023-10-09 PROCEDURE — 87086 URINE CULTURE/COLONY COUNT: CPT

## 2023-10-09 PROCEDURE — 96374 THER/PROPH/DIAG INJ IV PUSH: CPT | Mod: XU

## 2023-10-09 PROCEDURE — 99285 EMERGENCY DEPT VISIT HI MDM: CPT | Mod: 25

## 2023-10-09 PROCEDURE — 80053 COMPREHEN METABOLIC PANEL: CPT

## 2023-10-09 PROCEDURE — 74177 CT ABD & PELVIS W/CONTRAST: CPT | Mod: MA

## 2023-10-09 PROCEDURE — 84484 ASSAY OF TROPONIN QUANT: CPT

## 2023-10-09 PROCEDURE — 93005 ELECTROCARDIOGRAM TRACING: CPT

## 2023-10-09 RX ORDER — LIDOCAINE 4 G/100G
1 CREAM TOPICAL
Qty: 1 | Refills: 0
Start: 2023-10-09 | End: 2023-10-13

## 2023-10-09 RX ORDER — MORPHINE SULFATE 50 MG/1
4 CAPSULE, EXTENDED RELEASE ORAL ONCE
Refills: 0 | Status: DISCONTINUED | OUTPATIENT
Start: 2023-10-09 | End: 2023-10-09

## 2023-10-09 RX ADMIN — MORPHINE SULFATE 4 MILLIGRAM(S): 50 CAPSULE, EXTENDED RELEASE ORAL at 10:30

## 2023-10-09 NOTE — ED PROVIDER NOTE - CARE PROVIDER_API CALL
Missy [place]  Internal Medicine  [address] [place] [id number]  Phone: [phone]  Fax: [phone]  Follow Up Time: 1-3 Days

## 2023-10-09 NOTE — ED PROVIDER NOTE - PATIENT PORTAL LINK FT
You can access the FollowMyHealth Patient Portal offered by [facility] by registering at the following website: [email or web address]. By joining [employer]’s FollowMyHealth portal, you will also be able to view your health information using other applications (apps) compatible with our system.

## 2023-10-09 NOTE — ED PROVIDER NOTE - CLINICAL SUMMARY MEDICAL DECISION MAKING FREE TEXT BOX
Patient was recently diagnosed and treated for urinary tract infection.  Today he complains of low back pain.  There is paraspinal spasm and tenderness.  There is some tenderness over the sacroiliac joint.  There is no sciatic notch tenderness.  Abdomen is not tender.  There is no CVA tenderness.  Chest is clear.  Neuro is intact.  Diagnostic testing reviewed.  CT shows no AAA or kidney stone.  In my opinion, the etiology of the patient's symptoms are most likely due to mechanical back pain.  In my opinion, outpatient follow-up and treatment are medically appropriate.    I personally reviewed the radiographs performed on this patient and independently interpreted them. I used this review in my medical decision making.  CXR NAPD.

## 2023-10-09 NOTE — ED ADULT NURSE NOTE - NSFALLUNIVINTERV_ED_ALL_ED
Bed/Stretcher in lowest position, wheels locked, appropriate side rails in place/Call bell, personal items and telephone in reach/Instruct patient to call for assistance before getting out of bed/chair/stretcher/Non-slip footwear applied when patient is off stretcher/[place] to call system/Physically safe environment - no spills, clutter or unnecessary equipment/Purposeful proactive rounding/Room/bathroom lighting operational, light cord in reach

## 2023-10-09 NOTE — ED PROVIDER NOTE - PHYSICAL EXAMINATION
VITAL SIGNS: I have reviewed nursing notes and confirm.  CONSTITUTIONAL: Patient is in no acute distress.  SKIN: Skin exam is warm and dry, no acute rash.  HEAD: Normocephalic; atraumatic.  EYES: PERRL, EOM intact; conjunctiva and sclera clear.  ENT: No nasal discharge; airway clear.   NECK: Supple; non tender.  CARD: S1, S2 normal; no murmurs, gallops, or rubs. Regular rate and rhythm.  RESP: Clear to auscultation bilaterally. No wheezes, rales or rhonchi.  ABD: Normal bowel sounds; soft; non-distended; non-tender.   EXT: Normal ROM. No edema. +Tenderness to b/l paraspinal tenderness to lumbar region.   LYMPH: No acute cervical adenopathy.  NEURO: Alert, oriented. Grossly unremarkable. No focal deficits.  PSYCH: Cooperative, appropriate.

## 2023-10-09 NOTE — ED PROVIDER NOTE - ATTENDING APP SHARED VISIT CONTRIBUTION OF CARE
Patient was recently diagnosed and treated for urinary tract infection.  Today he complains of low back pain.  There is paraspinal spasm and tenderness.  There is some tenderness over the sacroiliac joint.  There is no sciatic notch tenderness.  Abdomen is not tender.  There is no CVA tenderness.  Chest is clear.  Neuro is intact.  Diagnostic testing reviewed.  CT shows no AAA or kidney stone.  In my opinion, the etiology of the patient's symptoms are most likely due to mechanical back pain.  In my opinion, outpatient follow-up and treatment are medically appropriate.

## 2023-10-09 NOTE — ED PROVIDER NOTE - OBJECTIVE STATEMENT
79 yo M with pmhx of CAD, DM, HLD presenting with lower back pain x 1 week. Reports pain worse to left lower back. States he was diagnosed 1 week ago with a uti. No cp, sob, fever, chills, abdominal pain, nausea, vomiting, diarrhea, urinary symptoms, headache, dizziness, paresthesias, or weakness.

## 2023-10-10 LAB
CULTURE RESULTS: SIGNIFICANT CHANGE UP
SPECIMEN SOURCE: SIGNIFICANT CHANGE UP

## 2023-10-15 ENCOUNTER — EMERGENCY (EMERGENCY)
Facility: HOSPITAL | Age: 78
LOS: 0 days | Discharge: HOME | End: 2023-10-15
Attending: EMERGENCY MEDICINE
Payer: MEDICARE

## 2023-10-15 VITALS
HEART RATE: 60 BPM | OXYGEN SATURATION: 99 % | DIASTOLIC BLOOD PRESSURE: 70 MMHG | RESPIRATION RATE: 18 BRPM | SYSTOLIC BLOOD PRESSURE: 160 MMHG

## 2023-10-15 VITALS
HEIGHT: 71 IN | SYSTOLIC BLOOD PRESSURE: 159 MMHG | RESPIRATION RATE: 18 BRPM | DIASTOLIC BLOOD PRESSURE: 70 MMHG | HEART RATE: 62 BPM | WEIGHT: 265 LBS | OXYGEN SATURATION: 99 % | TEMPERATURE: 96 F

## 2023-10-15 DIAGNOSIS — Z98.42 CATARACT EXTRACTION STATUS, LEFT EYE: ICD-10-CM

## 2023-10-15 DIAGNOSIS — N20.0 CALCULUS OF KIDNEY: Chronic | ICD-10-CM

## 2023-10-15 DIAGNOSIS — I25.10 ATHEROSCLEROTIC HEART DISEASE OF NATIVE CORONARY ARTERY WITHOUT ANGINA PECTORIS: ICD-10-CM

## 2023-10-15 DIAGNOSIS — E78.00 PURE HYPERCHOLESTEROLEMIA, UNSPECIFIED: ICD-10-CM

## 2023-10-15 DIAGNOSIS — Z95.5 PRESENCE OF CORONARY ANGIOPLASTY IMPLANT AND GRAFT: ICD-10-CM

## 2023-10-15 DIAGNOSIS — N50.811 RIGHT TESTICULAR PAIN: ICD-10-CM

## 2023-10-15 DIAGNOSIS — Z79.01 LONG TERM (CURRENT) USE OF ANTICOAGULANTS: ICD-10-CM

## 2023-10-15 DIAGNOSIS — Z98.890 OTHER SPECIFIED POSTPROCEDURAL STATES: Chronic | ICD-10-CM

## 2023-10-15 DIAGNOSIS — E03.9 HYPOTHYROIDISM, UNSPECIFIED: ICD-10-CM

## 2023-10-15 DIAGNOSIS — I25.2 OLD MYOCARDIAL INFARCTION: ICD-10-CM

## 2023-10-15 DIAGNOSIS — N40.0 BENIGN PROSTATIC HYPERPLASIA WITHOUT LOWER URINARY TRACT SYMPTOMS: ICD-10-CM

## 2023-10-15 DIAGNOSIS — Z79.84 LONG TERM (CURRENT) USE OF ORAL HYPOGLYCEMIC DRUGS: ICD-10-CM

## 2023-10-15 DIAGNOSIS — R10.9 UNSPECIFIED ABDOMINAL PAIN: ICD-10-CM

## 2023-10-15 DIAGNOSIS — Z86.718 PERSONAL HISTORY OF OTHER VENOUS THROMBOSIS AND EMBOLISM: ICD-10-CM

## 2023-10-15 DIAGNOSIS — I48.91 UNSPECIFIED ATRIAL FIBRILLATION: ICD-10-CM

## 2023-10-15 DIAGNOSIS — Z98.890 OTHER SPECIFIED POSTPROCEDURAL STATES: ICD-10-CM

## 2023-10-15 DIAGNOSIS — N50.812 LEFT TESTICULAR PAIN: ICD-10-CM

## 2023-10-15 DIAGNOSIS — I10 ESSENTIAL (PRIMARY) HYPERTENSION: ICD-10-CM

## 2023-10-15 DIAGNOSIS — Z98.49 CATARACT EXTRACTION STATUS, UNSPECIFIED EYE: Chronic | ICD-10-CM

## 2023-10-15 DIAGNOSIS — M54.9 DORSALGIA, UNSPECIFIED: ICD-10-CM

## 2023-10-15 DIAGNOSIS — Z88.1 ALLERGY STATUS TO OTHER ANTIBIOTIC AGENTS STATUS: ICD-10-CM

## 2023-10-15 DIAGNOSIS — E11.9 TYPE 2 DIABETES MELLITUS WITHOUT COMPLICATIONS: ICD-10-CM

## 2023-10-15 DIAGNOSIS — I25.10 ATHEROSCLEROTIC HEART DISEASE OF NATIVE CORONARY ARTERY WITHOUT ANGINA PECTORIS: Chronic | ICD-10-CM

## 2023-10-15 DIAGNOSIS — Z98.41 CATARACT EXTRACTION STATUS, RIGHT EYE: ICD-10-CM

## 2023-10-15 DIAGNOSIS — G89.29 OTHER CHRONIC PAIN: ICD-10-CM

## 2023-10-15 DIAGNOSIS — K21.9 GASTRO-ESOPHAGEAL REFLUX DISEASE WITHOUT ESOPHAGITIS: ICD-10-CM

## 2023-10-15 PROCEDURE — 99284 EMERGENCY DEPT VISIT MOD MDM: CPT | Mod: FS

## 2023-10-15 PROCEDURE — 76870 US EXAM SCROTUM: CPT

## 2023-10-15 PROCEDURE — 76870 US EXAM SCROTUM: CPT | Mod: 26

## 2023-10-15 PROCEDURE — 99284 EMERGENCY DEPT VISIT MOD MDM: CPT | Mod: 25

## 2023-10-15 RX ORDER — RIVAROXABAN 15 MG-20MG
1 KIT ORAL
Qty: 0 | Refills: 0 | DISCHARGE

## 2023-10-15 NOTE — ED PROVIDER NOTE - CARE PROVIDER_API CALL
Willie Baker  Orthopaedic Surgery  [address] [place] [address]  Phone: [phone]  Fax: [phone]  Follow Up Time:

## 2023-10-15 NOTE — ED PROVIDER NOTE - ATTENDING APP SHARED VISIT CONTRIBUTION OF CARE
Patient with left flank pain radiating to his testicles.  Was previously seen in ed about 1 week ago with negative CT abdomen pelvis, UA.  Patient has no rash fever chills.  Agree with above exam  Impression  Patient with left flank and inguinal pain radiating to his testicles.  Here imaging is testicles unremarkable.  Pain is likely MSK in etiology especially the fact that patient had a negative work-up oct 9th.

## 2023-10-15 NOTE — ED ADULT NURSE NOTE - NSFALLUNIVINTERV_ED_ALL_ED
Bed/Stretcher in lowest position, wheels locked, appropriate side rails in place/Call bell, personal items and telephone in reach/Instruct patient to call for assistance before getting out of bed/chair/stretcher/Non-slip footwear applied when patient is off stretcher/[place] to call system/Physically safe environment - no spills, clutter or unnecessary equipment/Purposeful proactive rounding/Room/bathroom lighting operational, light cord in reach

## 2023-10-15 NOTE — ED PROVIDER NOTE - CLINICAL SUMMARY MEDICAL DECISION MAKING FREE TEXT BOX
Patient with left flank and inguinal pain radiating to his testicles.  Here imaging is testicles unremarkable.  Pain is likely MSK in etiology especially the fact that patient had a negative work-up oct 9th.

## 2023-10-15 NOTE — ED PROVIDER NOTE - PATIENT PORTAL LINK FT
You can access the FollowMyHealth Patient Portal offered by [facility] by registering at the following website: [email or web address]. By joining [employer]’s FollowMyHealth portal, you will also be able to view your health information using other applications (apps) compatible with our system.

## 2023-10-15 NOTE — ED PROVIDER NOTE - PHYSICAL EXAMINATION
general: well appearing, comfortable  eyes: clear conjunctiva  abd: non tender, non distended, BS x 4, no CVA tenderness  msk: neck supple, pelvis stable, neg SLR, no sciatic notch tenderness, no foot drop, no vertebral tenderness, flexion / extension lumbar region in tact  skin: no rashes, swelling  neuro: alert, oriented , no motor or sensory deficits , gait steady

## 2023-10-15 NOTE — ED PROVIDER NOTE - NS ED MD DISPO DISCHARGE CCDA
National Crisis and Suicide Hotline number: 65. Food pantry options in area: The number is:  211 or you can also look at this website:  https://www. [employer].org/  The quality of the information on this site will be based on information having been inputted by the local agencies. Here is the link for [employer]:  https://www. [employer]. [employer]/   Food distributions are carried out every Saturday from 8 AM to 10:30 AM from their warehouse at [address] [place] [address] and at [facility], [address] [address] [facility]. The eligibility information is on their website. There are also directions on how to contact them if you have any questions. Here is a link for the [facility]:  [email or web address]   This has information about nonprofits in our region that provide services to local citizens with various needs.     HELP - [address] [facility], every Friday 11-3 pm (east of [facility])    [facility] Sat 7:30 -10, [address] [place] [address], 3rd Saturday of the month,  8-10:30 am    [facility] [address] [place] [address] 9-11 (for those who live [place] [address]) Patient/Caregiver provided printed discharge information.

## 2023-10-15 NOTE — ED PROVIDER NOTE - OBJECTIVE STATEMENT
79 y/o male with hx of CAD, DM, a.fib, HLD presents to the ED with left flank pain x 1 week. patient states pain radiating to left testicle. no dysuria or gross hematuria. no testicular swelling. no cp, sob, fever, chills, abdominal pain, nausea, vomiting, diarrhea, urinary symptoms, patient had CT scan , labs and ultrasound one week ago. denies any change with movement. no rashes.

## 2023-10-17 NOTE — ED ADULT NURSE NOTE - NSHOSCREENINGQ1_ED_ALL_ED
----- Message from Marzena Clay sent at 10/17/2023  2:20 PM CDT -----  PT. CALLING BACK.  [id number] [facility] [phone] No

## 2023-10-18 ENCOUNTER — OUTPATIENT (OUTPATIENT)
Dept: OUTPATIENT SERVICES | Facility: HOSPITAL | Age: 78
LOS: 1 days | End: 2023-10-18
Payer: MEDICARE

## 2023-10-18 DIAGNOSIS — N20.0 CALCULUS OF KIDNEY: Chronic | ICD-10-CM

## 2023-10-18 DIAGNOSIS — Z00.8 ENCOUNTER FOR OTHER GENERAL EXAMINATION: ICD-10-CM

## 2023-10-18 DIAGNOSIS — Z98.49 CATARACT EXTRACTION STATUS, UNSPECIFIED EYE: Chronic | ICD-10-CM

## 2023-10-18 DIAGNOSIS — Z98.890 OTHER SPECIFIED POSTPROCEDURAL STATES: Chronic | ICD-10-CM

## 2023-10-18 DIAGNOSIS — I25.10 ATHEROSCLEROTIC HEART DISEASE OF NATIVE CORONARY ARTERY WITHOUT ANGINA PECTORIS: Chronic | ICD-10-CM

## 2023-10-18 DIAGNOSIS — M54.50 LOW BACK PAIN, UNSPECIFIED: ICD-10-CM

## 2023-10-18 PROCEDURE — 72131 CT LUMBAR SPINE W/O DYE: CPT | Mod: 26

## 2023-10-18 PROCEDURE — 72131 CT LUMBAR SPINE W/O DYE: CPT

## 2023-10-19 DIAGNOSIS — M54.50 LOW BACK PAIN, UNSPECIFIED: ICD-10-CM

## 2023-10-26 NOTE — ED PROVIDER NOTE - NSFOLLOWUPINSTRUCTIONS_ED_ALL_ED_FT
Back Pain    Back pain is very common in adults. The cause of back pain is rarely dangerous and the pain often gets better over time. The cause of your back pain may not be known and may include strain of muscles or ligaments, degeneration of the spinal disks, or arthritis. Occasionally the pain may radiate down your leg(s). Over-the-counter medicines to reduce pain and inflammation are often the most helpful. Stretching and remaining active frequently helps the healing process.     SEEK IMMEDIATE MEDICAL CARE IF YOU HAVE THE FOLLOWING SYMPTOMS: bowel or bladder control problems, unusual weakness or numbness in your arms or legs, nausea or vomiting, abdominal pain, fever, dizziness/lightheadedness.
1
See Care Plan under Instructions
normal (ped)...

## 2023-10-30 ENCOUNTER — APPOINTMENT (OUTPATIENT)
Dept: ORTHOPEDIC SURGERY | Facility: CLINIC | Age: 78
End: 2023-10-30
Payer: MEDICARE

## 2023-10-30 PROCEDURE — 99214 OFFICE O/P EST MOD 30 MIN: CPT

## 2023-11-02 ENCOUNTER — APPOINTMENT (OUTPATIENT)
Dept: ORTHOPEDIC SURGERY | Facility: CLINIC | Age: 78
End: 2023-11-02
Payer: MEDICARE

## 2023-11-02 VITALS — HEIGHT: 71 IN | WEIGHT: 260 LBS | BODY MASS INDEX: 36.4 KG/M2

## 2023-11-02 DIAGNOSIS — S83.249A OTHER TEAR OF MEDIAL MENISCUS, CURRENT INJURY, UNSPECIFIED KNEE, INITIAL ENCOUNTER: ICD-10-CM

## 2023-11-02 DIAGNOSIS — M94.20 CHONDROMALACIA, UNSPECIFIED SITE: ICD-10-CM

## 2023-11-02 PROCEDURE — 99214 OFFICE O/P EST MOD 30 MIN: CPT

## 2023-11-03 NOTE — ED ADULT NURSE NOTE - NSSISCREENINGQ4_ED_A_ED
INFECTIOUS DISEASES CONSULT FOLLOW-UP NOTE    INTERVAL HPI/OVERNIGHT EVENTS:    Patient seen and examined at bedside. LAMONT. Afebrile. States itching around groin is improved with antifungal cream. Endorses back pain.       ROS:   Constitutional, eyes, ENT, cardiovascular, respiratory, gastrointestinal, genitourinary, integumentary, neurological, psychiatric and heme/lymph are otherwise negative other than noted above       ANTIBIOTICS/RELEVANT:    MEDICATIONS  (STANDING):  clotrimazole 1% Cream 1 Application(s) Topical two times a day  dapagliflozin 10 milliGRAM(s) Oral every 24 hours  DAPTOmycin IVPB 500 milliGRAM(s) IV Intermittent every 24 hours  DAPTOmycin IVPB      magnesium oxide 400 milliGRAM(s) Oral three times a day with meals  trimethoprim  160 mG/sulfamethoxazole 800 mG 1 Tablet(s) Oral daily  valsartan 40 milliGRAM(s) Oral daily    MEDICATIONS  (PRN):        Vital Signs Last 24 Hrs  T(C): 36.4 (03 Nov 2023 08:35), Max: 36.6 (02 Nov 2023 20:52)  T(F): 97.5 (03 Nov 2023 08:35), Max: 97.8 (02 Nov 2023 20:52)  HR: 55 (03 Nov 2023 08:35) (55 - 95)  BP: 109/67 (03 Nov 2023 08:35) (102/70 - 121/66)  BP(mean): --  RR: 17 (03 Nov 2023 08:35) (17 - 17)  SpO2: 97% (03 Nov 2023 08:35) (97% - 97%)    Parameters below as of 03 Nov 2023 08:35  Patient On (Oxygen Delivery Method): room air        11-02-23 @ 07:01  -  11-03-23 @ 07:00  --------------------------------------------------------  IN: 480 mL / OUT: 1300 mL / NET: -820 mL    11-03-23 @ 07:01  -  11-03-23 @ 12:30  --------------------------------------------------------  IN: 560 mL / OUT: 1000 mL / NET: -440 mL      PHYSICAL EXAM:  Constitutional: alert, NAD  Eyes: the sclera and conjunctiva were normal.   ENT: the ears and nose were normal in appearance.   Neck: the appearance of the neck was normal and the neck was supple. ROM intact and w/o pain  Pulmonary: no respiratory distress and lungs were clear to auscultation bilaterally.   Heart: heart rate was normal and rhythm regular, normal S1 and S2  Back: thoracic spinous tenderness noted. No cervical or lumbar spine tenderness   Vascular:. there was no peripheral edema  Abdomen: normal bowel sounds, soft, non-tender  : +intertrigo   Neurological: no focal deficits.   Psychiatric: the affect was normal      LABS:                        10.8   3.17  )-----------( 223      ( 03 Nov 2023 05:30 )             33.8     11-03    136  |  102  |  12  ----------------------------<  93  4.4   |  24  |  0.85    Ca    9.4      03 Nov 2023 05:30  Mg     1.4     11-03      PT/INR - ( 03 Nov 2023 05:30 )   PT: 12.4 sec;   INR: 1.09          PTT - ( 03 Nov 2023 05:30 )  PTT:36.5 sec  Urinalysis Basic - ( 03 Nov 2023 05:30 )    Color: x / Appearance: x / SG: x / pH: x  Gluc: 93 mg/dL / Ketone: x  / Bili: x / Urobili: x   Blood: x / Protein: x / Nitrite: x   Leuk Esterase: x / RBC: x / WBC x   Sq Epi: x / Non Sq Epi: x / Bacteria: x        MICROBIOLOGY:  reviewed    RADIOLOGY & ADDITIONAL STUDIES:  Reviewed INFECTIOUS DISEASES CONSULT FOLLOW-UP NOTE    INTERVAL HPI/OVERNIGHT EVENTS:    Patient seen and examined at bedside. LAMONT. Afebrile. States itching around groin is improved with antifungal cream. Endorses back pain.       ROS:   Constitutional, eyes, ENT, cardiovascular, respiratory, gastrointestinal, genitourinary, integumentary, neurological, psychiatric and heme/lymph are otherwise negative other than noted above       ANTIBIOTICS/RELEVANT:    MEDICATIONS  (STANDING):  clotrimazole 1% Cream 1 Application(s) Topical two times a day  dapagliflozin 10 milliGRAM(s) Oral every 24 hours  DAPTOmycin IVPB 500 milliGRAM(s) IV Intermittent every 24 hours  DAPTOmycin IVPB      magnesium oxide 400 milliGRAM(s) Oral three times a day with meals  trimethoprim  160 mG/sulfamethoxazole 800 mG 1 Tablet(s) Oral daily  valsartan 40 milliGRAM(s) Oral daily    MEDICATIONS  (PRN):        Vital Signs Last 24 Hrs  T(C): 36.4 (03 Nov 2023 08:35), Max: 36.6 (02 Nov 2023 20:52)  T(F): 97.5 (03 Nov 2023 08:35), Max: 97.8 (02 Nov 2023 20:52)  HR: 55 (03 Nov 2023 08:35) (55 - 95)  BP: 109/67 (03 Nov 2023 08:35) (102/70 - 121/66)  BP(mean): --  RR: 17 (03 Nov 2023 08:35) (17 - 17)  SpO2: 97% (03 Nov 2023 08:35) (97% - 97%)    Parameters below as of 03 Nov 2023 08:35  Patient On (Oxygen Delivery Method): room air        11-02-23 @ 07:01  -  11-03-23 @ 07:00  --------------------------------------------------------  IN: 480 mL / OUT: 1300 mL / NET: -820 mL    11-03-23 @ 07:01  -  11-03-23 @ 12:30  --------------------------------------------------------  IN: 560 mL / OUT: 1000 mL / NET: -440 mL      PHYSICAL EXAM:  Constitutional: alert, NAD  Eyes: the sclera and conjunctiva were normal.   ENT: the ears and nose were normal in appearance.   Mouth: no thrush  Neck: the appearance of the neck was normal and the neck was supple. ROM intact and w/o pain  Pulmonary: no respiratory distress and lungs were clear to auscultation bilaterally.   Heart: heart rate was normal and rhythm regular, normal S1 and S2  Back: thoracic spinous tenderness noted. No cervical or lumbar spine tenderness   Vascular:. there was no peripheral edema  Abdomen: normal bowel sounds, soft, non-tender  : +intertrigo   Neurological: no focal deficits.   Psychiatric: the affect was normal      LABS:                        10.8   3.17  )-----------( 223      ( 03 Nov 2023 05:30 )             33.8     11-03    136  |  102  |  12  ----------------------------<  93  4.4   |  24  |  0.85    Ca    9.4      03 Nov 2023 05:30  Mg     1.4     11-03      PT/INR - ( 03 Nov 2023 05:30 )   PT: 12.4 sec;   INR: 1.09          PTT - ( 03 Nov 2023 05:30 )  PTT:36.5 sec  Urinalysis Basic - ( 03 Nov 2023 05:30 )    Color: x / Appearance: x / SG: x / pH: x  Gluc: 93 mg/dL / Ketone: x  / Bili: x / Urobili: x   Blood: x / Protein: x / Nitrite: x   Leuk Esterase: x / RBC: x / WBC x   Sq Epi: x / Non Sq Epi: x / Bacteria: x        MICROBIOLOGY:  reviewed    RADIOLOGY & ADDITIONAL STUDIES:  Reviewed No

## 2023-11-16 ENCOUNTER — OUTPATIENT (OUTPATIENT)
Dept: OUTPATIENT SERVICES | Facility: HOSPITAL | Age: 78
LOS: 1 days | End: 2023-11-16
Payer: MEDICARE

## 2023-11-16 VITALS
RESPIRATION RATE: 18 BRPM | HEIGHT: 71 IN | HEART RATE: 72 BPM | SYSTOLIC BLOOD PRESSURE: 140 MMHG | TEMPERATURE: 99 F | DIASTOLIC BLOOD PRESSURE: 72 MMHG | OXYGEN SATURATION: 97 % | WEIGHT: 255.07 LBS

## 2023-11-16 DIAGNOSIS — M94.20 CHONDROMALACIA, UNSPECIFIED SITE: ICD-10-CM

## 2023-11-16 DIAGNOSIS — Z98.890 OTHER SPECIFIED POSTPROCEDURAL STATES: Chronic | ICD-10-CM

## 2023-11-16 DIAGNOSIS — N20.0 CALCULUS OF KIDNEY: Chronic | ICD-10-CM

## 2023-11-16 DIAGNOSIS — Z98.49 CATARACT EXTRACTION STATUS, UNSPECIFIED EYE: Chronic | ICD-10-CM

## 2023-11-16 DIAGNOSIS — Z01.818 ENCOUNTER FOR OTHER PREPROCEDURAL EXAMINATION: ICD-10-CM

## 2023-11-16 DIAGNOSIS — I25.10 ATHEROSCLEROTIC HEART DISEASE OF NATIVE CORONARY ARTERY WITHOUT ANGINA PECTORIS: Chronic | ICD-10-CM

## 2023-11-16 PROCEDURE — 93005 ELECTROCARDIOGRAM TRACING: CPT

## 2023-11-16 PROCEDURE — 93010 ELECTROCARDIOGRAM REPORT: CPT

## 2023-11-16 PROCEDURE — 97116 GAIT TRAINING THERAPY: CPT | Mod: GP

## 2023-11-16 PROCEDURE — 99214 OFFICE O/P EST MOD 30 MIN: CPT | Mod: 25

## 2023-11-16 RX ORDER — AMOXICILLIN 250 MG/5ML
1 SUSPENSION, RECONSTITUTED, ORAL (ML) ORAL
Refills: 0 | DISCHARGE

## 2023-11-16 RX ORDER — METFORMIN HYDROCHLORIDE 850 MG/1
1 TABLET ORAL
Qty: 0 | Refills: 0 | DISCHARGE

## 2023-11-16 NOTE — H&P PST ADULT - HISTORY OF PRESENT ILLNESS
PT REPORTS INJURY TO RIGHT KNEE - FELL OUT OF TRUCK AND LANDED ON RIGHT KNEE APPROX ONE YEAR AGO.  HAS TRIED CONSERVATIVE MEASURES WHICH HAVE NOT WORKED.    PAIN IS DULL 10/10 AT WORST   INCREASES WITH WALKING   DECREASES WITH REST   DOES NOT RADIATE   RELIEVED WITH TRAMADOL  \  PATIENT CURRENTLY DENIES CHEST PAIN    PALPITATIONS,  DYSURIA, OR URI WITHIN THE IN PAST 2 WEEKS    -Denies travel outside the [place] in the past 30 days  -Patient denies any signs or symptoms of COVID 19 and denies contact with known positive individuals.    -Patient was instructed to quarantine pre-procedure, practice exposure control measures, continue to self-monitor and report any concerns to their proceduralist.  -Pt advised self quarantine till day of procedure    ANESTHESIA ALERT  YES--Difficult Airway  NO--History of neck surgery or radiation  YES--Limited ROM of neck  NO--History of Malignant hyperthermia  NO--No personal or family history of Pseudocholinesterase deficiency.  NO--Prior Anesthesia Complication  NO--Latex Allergy  NO--Loose teeth  NO--History of Rheumatoid Arthritis  NO--Bleeding risk  YES--ETELVINA  INTERMITTENT USE OF CPAP   ADVISED TO BRING DOP  NO--Implants  DENTAL  NO--Other_____    -PT DENIES ANY RASHES, ABRASION, OR OPEN WOUNDS     -Pt denies any suicidal ideation or thoughts.  Pt states not a threat to self or others.  DENIES DOMESTIC VIOLENCE      AS PER THE PT, THIS IS HIS/HER COMPLETE MEDICAL AND SURGICAL HX, INCLUDING MEDICATIONS PRESCRIBED AND OVER THE COUNTER    Patient verbalized understanding of instructions and was given the opportunity to ask questions and have them answered.       Duke Activity Status Index (DASI) from [employer]  on 11/16/2023  ** All calculations should be rechecked by clinician prior to use **    RESULT SUMMARY:  30.2 points  The higher the score (maximum 58.2), the higher the functional status.    6.45 METs        INPUTS:  Take care of self —> 2.75 = Yes  Walk indoors —> 1.75 = Yes  Walk 1&ndash;2 blocks on level ground —> 2.75 = Yes  Climb a flight of stairs or walk up a hill —> 5.5 = Yes  Run a short distance —> 0 = No  Do light work around the house —> 2.7 = Yes  Do moderate work around the house —> 3.5 = Yes  Do heavy work around the house —> 0 = No  Do yardwork —> 0 = No  Have sexual relations —> 5.25 = Yes  Participate in moderate recreational activities —> 6 = Yes  Participate in strenuous sports —> 0 = No      RCRI 0     CLASS RISK I     3.9    PT REPORTS LAST TSH WNL~ 3 MONTHS AGO  LAST HGB A1C "LAST MONTH" ~6

## 2023-11-16 NOTE — H&P PST ADULT - NSICDXPASTSURGICALHX_GEN_ALL_CORE_FT
PAST SURGICAL HISTORY:  CAD (coronary artery disease) stents (4)    H/O arthroscopy of knee RT -2 AND LT -1    H/O arthroscopy of shoulder RT ROTATOR CUFF 2019    H/O hernia repair RT INGUINAL -2 AND LT INGUINAL -1    History of parathyroid surgery     Kidney stone RT ESWL OCT 2019    S/P cardiac cath     S/P cataract surgery rt and lt

## 2023-11-16 NOTE — H&P PST ADULT - REASON FOR ADMISSION
Suite: CASProceduralist: Willie Baker  Confirmed Surgery DateTime: 11- - 0:00PAST DateTime: 11- - 8:30Procedure: RIGHT KNEE ARTHROSCOPY MEDIAL MENISCECTOMY  ERP?: UnavailableLaterality: RightLength of Procedure: 90 Minutes  Anesthesia Type: General

## 2023-11-16 NOTE — H&P PST ADULT - NSICDXPASTMEDICALHX_GEN_ALL_CORE_FT
PAST MEDICAL HISTORY:  Afib Sep 2019; on Xarelto    Back pain chronic    BPH (benign prostatic hyperplasia)     CAD (coronary artery disease) STENTS 2000 AND 2002 (4 stents)    DM (diabetes mellitus)     Dry eyes     DVT (deep venous thrombosis) LEFT LE- 2008 AND 2018 AFTER INJURY    Esophagitis     GERD (gastroesophageal reflux disease)     High cholesterol     HTN (hypertension)     Hypertension complications     Hypothyroid     Myocardial infarction 2000    Obesity     ETELVINA (obstructive sleep apnea) NOT USING cpap    Pain     Seizure last episode 2 yrs ago-REACTION TO CIPRO    Sleep apnea Dx 10 yrs ago; denies CPAP use     PAST MEDICAL HISTORY:  Afib Sep 2019; on Xarelto    Back pain chronic    BPH (benign prostatic hyperplasia)     CAD (coronary artery disease) STENTS 2000 AND 2002 (4 stents)    Ciprofloxacin adverse reaction     DM (diabetes mellitus)     Dry eyes     DVT (deep venous thrombosis) LEFT LE- 2008 AND 2018 AFTER INJURY    Esophagitis     GERD (gastroesophageal reflux disease)     High cholesterol     HTN (hypertension)     Hypertension complications     Hypothyroid     Myocardial infarction 2000    Obesity     ETELVINA (obstructive sleep apnea) NOT USING cpap    Pain     Seizure last episode 2 yrs ago-REACTION TO CIPRO    Sleep apnea Dx 10 yrs ago; denies CPAP use

## 2023-11-17 DIAGNOSIS — M94.20 CHONDROMALACIA, UNSPECIFIED SITE: ICD-10-CM

## 2023-11-17 DIAGNOSIS — Z01.818 ENCOUNTER FOR OTHER PREPROCEDURAL EXAMINATION: ICD-10-CM

## 2023-11-20 ENCOUNTER — OUTPATIENT (OUTPATIENT)
Dept: OUTPATIENT SERVICES | Facility: HOSPITAL | Age: 78
LOS: 1 days | End: 2023-11-20

## 2023-11-20 ENCOUNTER — OUTPATIENT (OUTPATIENT)
Dept: OUTPATIENT SERVICES | Facility: HOSPITAL | Age: 78
LOS: 1 days | End: 2023-11-20
Payer: MEDICARE

## 2023-11-20 DIAGNOSIS — N20.0 CALCULUS OF KIDNEY: Chronic | ICD-10-CM

## 2023-11-20 DIAGNOSIS — Z02.9 ENCOUNTER FOR ADMINISTRATIVE EXAMINATIONS, UNSPECIFIED: ICD-10-CM

## 2023-11-20 DIAGNOSIS — Z98.890 OTHER SPECIFIED POSTPROCEDURAL STATES: Chronic | ICD-10-CM

## 2023-11-20 DIAGNOSIS — I25.10 ATHEROSCLEROTIC HEART DISEASE OF NATIVE CORONARY ARTERY WITHOUT ANGINA PECTORIS: Chronic | ICD-10-CM

## 2023-11-20 DIAGNOSIS — Z98.49 CATARACT EXTRACTION STATUS, UNSPECIFIED EYE: Chronic | ICD-10-CM

## 2023-11-20 DIAGNOSIS — R91.1 SOLITARY PULMONARY NODULE: ICD-10-CM

## 2023-11-20 PROBLEM — T36.8X5A: Chronic | Status: ACTIVE | Noted: 2023-11-16

## 2023-11-20 PROCEDURE — 71046 X-RAY EXAM CHEST 2 VIEWS: CPT | Mod: 26

## 2023-11-20 PROCEDURE — 71046 X-RAY EXAM CHEST 2 VIEWS: CPT

## 2023-11-21 DIAGNOSIS — R91.1 SOLITARY PULMONARY NODULE: ICD-10-CM

## 2023-11-21 DIAGNOSIS — Z02.9 ENCOUNTER FOR ADMINISTRATIVE EXAMINATIONS, UNSPECIFIED: ICD-10-CM

## 2023-11-22 ENCOUNTER — APPOINTMENT (OUTPATIENT)
Dept: CARDIOLOGY | Facility: CLINIC | Age: 78
End: 2023-11-22
Payer: MEDICARE

## 2023-11-22 VITALS
HEART RATE: 77 BPM | BODY MASS INDEX: 35 KG/M2 | WEIGHT: 250 LBS | HEIGHT: 71 IN | SYSTOLIC BLOOD PRESSURE: 140 MMHG | DIASTOLIC BLOOD PRESSURE: 80 MMHG

## 2023-11-22 DIAGNOSIS — E11.9 TYPE 2 DIABETES MELLITUS W/OUT COMPLICATIONS: ICD-10-CM

## 2023-11-22 PROCEDURE — 99214 OFFICE O/P EST MOD 30 MIN: CPT

## 2023-11-22 PROCEDURE — 93000 ELECTROCARDIOGRAM COMPLETE: CPT

## 2023-11-27 DIAGNOSIS — Z98.61 CORONARY ANGIOPLASTY STATUS: ICD-10-CM

## 2023-11-27 RX ORDER — TRAMADOL HYDROCHLORIDE 50 MG/1
50 TABLET, COATED ORAL 4 TIMES DAILY
Qty: 28 | Refills: 0 | Status: ACTIVE | COMMUNITY
Start: 2023-11-27 | End: 1900-01-01

## 2023-11-27 NOTE — ASU PATIENT PROFILE, ADULT - BARIATRIC
7/15/2021  IChip MD, saw and evaluated the patient  I have discussed the patient with the resident/non-physician practitioner and agree with the resident's/non-physician practitioner's findings, Plan of Care, and MDM as documented in the resident's/non-physician practitioner's note, except where noted  All available labs and Radiology studies were reviewed  I was present for key portions of any procedure(s) performed by the resident/non-physician practitioner and I was immediately available to provide assistance  At this point I agree with the current assessment done in the Emergency Department  I have conducted an independent evaluation of this patient a history and physical is as follows:    ED Course         Critical Care Time  Procedures    Patient is a 42 yom, right testicular pain  Severe  Denies f/c but he is febrile here  Patient is quadriplegic from a recent fall      + testicular tenderness  + firm right testicle  On physical exam, patient is quadriplegic, in NAD  Some RLQ abdominal tenderness  MDM concern for testicular pathology, tachy with fever, will image  Will treat with abx/fluids  no

## 2023-11-27 NOTE — ASU PATIENT PROFILE, ADULT - AS SC BRADEN FRICTION
Render Note In Bullet Format When Appropriate: No Detail Level: Zone Render Post-Care Instructions In Note?: yes Duration Of Freeze Thaw-Cycle (Seconds): 2 Number Of Freeze-Thaw Cycles: 1 freeze-thaw cycle Post-Care Instructions: I reviewed with the patient in detail post-care instructions. Patient is to wear sunprotection, and avoid picking at any of the treated lesions. Pt may apply Vaseline to crusted or scabbing areas. Consent: The patient's consent was obtained including but not limited to risks of crusting, scabbing, blistering, scarring, darker or lighter pigmentary change, recurrence, incomplete removal and infection. (3) no apparent problem

## 2023-11-27 NOTE — ASU PATIENT PROFILE, ADULT - NSICDXPASTMEDICALHX_GEN_ALL_CORE_FT
PAST MEDICAL HISTORY:  Afib Sep 2019; on Xarelto    Back pain chronic    BPH (benign prostatic hyperplasia)     CAD (coronary artery disease) STENTS 2000 AND 2002 (4 stents)    Ciprofloxacin adverse reaction     DM (diabetes mellitus)     Dry eyes     DVT (deep venous thrombosis) LEFT LE- 2008 AND 2018 AFTER INJURY    Esophagitis     GERD (gastroesophageal reflux disease)     High cholesterol     HTN (hypertension)     Hypertension complications     Hypothyroid     Myocardial infarction 2000    Obesity     ETELVINA (obstructive sleep apnea) NOT USING cpap    Pain     Seizure last episode 2 yrs ago-REACTION TO CIPRO    Sleep apnea Dx 10 yrs ago; denies CPAP use

## 2023-11-28 ENCOUNTER — TRANSCRIPTION ENCOUNTER (OUTPATIENT)
Age: 78
End: 2023-11-28

## 2023-11-28 ENCOUNTER — APPOINTMENT (OUTPATIENT)
Dept: ORTHOPEDIC SURGERY | Facility: AMBULATORY SURGERY CENTER | Age: 78
End: 2023-11-28

## 2023-11-28 ENCOUNTER — OUTPATIENT (OUTPATIENT)
Dept: OUTPATIENT SERVICES | Facility: HOSPITAL | Age: 78
LOS: 1 days | Discharge: HOME | End: 2023-11-28
Payer: MEDICARE

## 2023-11-28 VITALS
TEMPERATURE: 98 F | HEIGHT: 71 IN | RESPIRATION RATE: 18 BRPM | DIASTOLIC BLOOD PRESSURE: 65 MMHG | OXYGEN SATURATION: 96 % | HEART RATE: 63 BPM | WEIGHT: 255.07 LBS | SYSTOLIC BLOOD PRESSURE: 131 MMHG

## 2023-11-28 VITALS — RESPIRATION RATE: 20 BRPM | SYSTOLIC BLOOD PRESSURE: 130 MMHG | DIASTOLIC BLOOD PRESSURE: 68 MMHG | HEART RATE: 70 BPM

## 2023-11-28 DIAGNOSIS — Z98.890 OTHER SPECIFIED POSTPROCEDURAL STATES: Chronic | ICD-10-CM

## 2023-11-28 DIAGNOSIS — N20.0 CALCULUS OF KIDNEY: Chronic | ICD-10-CM

## 2023-11-28 DIAGNOSIS — Z98.49 CATARACT EXTRACTION STATUS, UNSPECIFIED EYE: Chronic | ICD-10-CM

## 2023-11-28 DIAGNOSIS — I25.10 ATHEROSCLEROTIC HEART DISEASE OF NATIVE CORONARY ARTERY WITHOUT ANGINA PECTORIS: Chronic | ICD-10-CM

## 2023-11-28 DIAGNOSIS — M94.20 CHONDROMALACIA, UNSPECIFIED SITE: ICD-10-CM

## 2023-11-28 LAB
GLUCOSE BLDC GLUCOMTR-MCNC: 100 MG/DL — HIGH (ref 70–99)
GLUCOSE BLDC GLUCOMTR-MCNC: 100 MG/DL — HIGH (ref 70–99)

## 2023-11-28 PROCEDURE — 82962 GLUCOSE BLOOD TEST: CPT

## 2023-11-28 PROCEDURE — 29880 ARTHRS KNE SRG MNISECTMY M&L: CPT | Mod: RT

## 2023-11-28 RX ORDER — ACETAMINOPHEN 500 MG
1000 TABLET ORAL ONCE
Refills: 0 | Status: DISCONTINUED | OUTPATIENT
Start: 2023-11-28 | End: 2023-11-28

## 2023-11-28 RX ORDER — HYDROMORPHONE HYDROCHLORIDE 2 MG/ML
0.5 INJECTION INTRAMUSCULAR; INTRAVENOUS; SUBCUTANEOUS
Refills: 0 | Status: DISCONTINUED | OUTPATIENT
Start: 2023-11-28 | End: 2023-11-28

## 2023-11-28 RX ORDER — OXYCODONE HYDROCHLORIDE 5 MG/1
5 TABLET ORAL ONCE
Refills: 0 | Status: DISCONTINUED | OUTPATIENT
Start: 2023-11-28 | End: 2023-11-28

## 2023-11-28 RX ORDER — ONDANSETRON 8 MG/1
4 TABLET, FILM COATED ORAL ONCE
Refills: 0 | Status: DISCONTINUED | OUTPATIENT
Start: 2023-11-28 | End: 2023-11-28

## 2023-11-28 RX ORDER — ESLICARBAZEPINE ACETATE 800 MG/1
0 TABLET ORAL
Qty: 0 | Refills: 0 | DISCHARGE

## 2023-11-28 RX ORDER — SODIUM CHLORIDE 9 MG/ML
1000 INJECTION, SOLUTION INTRAVENOUS
Refills: 0 | Status: DISCONTINUED | OUTPATIENT
Start: 2023-11-28 | End: 2023-11-28

## 2023-11-28 RX ORDER — ESLICARBAZEPINE ACETATE 800 MG/1
1 TABLET ORAL
Qty: 0 | Refills: 0 | DISCHARGE

## 2023-11-28 RX ORDER — AMLODIPINE BESYLATE AND BENAZEPRIL HYDROCHLORIDE 10; 20 MG/1; MG/1
0 CAPSULE ORAL
Qty: 0 | Refills: 0 | DISCHARGE

## 2023-11-28 RX ADMIN — SODIUM CHLORIDE 100 MILLILITER(S): 9 INJECTION, SOLUTION INTRAVENOUS at 13:25

## 2023-11-28 NOTE — BRIEF OPERATIVE NOTE - NSICDXBRIEFPOSTOP_GEN_ALL_CORE_FT
POST-OP DIAGNOSIS:  Tear of medial meniscus of right knee 28-Nov-2023 13:15:51  Jas Bender  Tear of lateral meniscus of right knee 28-Nov-2023 13:16:04  Jas Bender

## 2023-11-28 NOTE — PRE-ANESTHESIA EVALUATION ADULT - NSANTHPMHFT_GEN_ALL_CORE
history of CAD (PCI RCA 2019, PCI LAD 2001; LCx 100%), paroxysmal AF on DOAC, HTN, HLD, hypothyroidism, and ETELVINA.  8/2023 was admitted for evaluation of chest discomfort and dyspnea on exertion along with bilateral lower extremity edema in the context of starting HRT 4 weeks prior. He was noted to be in rapid AF in the setting of acute on chronic diastolic heart failure. He underwent DCCV and was started on dronedarone 400mg BID.  TTE 9/2022 - Normal systolic function, posterobasal hypokinesis, severe LA dilatation, mild MR, mild AI, mild Ao sclerosis, in NSR  TTE 8/2023 - EF 55-60%, G1DD, moderate LA dilatation, fibrocalcific Ao  Cardiology  note stated patient acceptable risk. >4 METS. Has known moderate, stable defect in LAD distribution. Managed medically.

## 2023-11-28 NOTE — ASU DISCHARGE PLAN (ADULT/PEDIATRIC) - ASU DC SPECIAL INSTRUCTIONSFT
Post-Operative Knee Arthroscopy Instructions    Anesthesia:  - No Alcoholic beverages, including beer and wine, for 24 hours or while on prescribed pain medications  - Do not make any important decisions or sign any legal documents  - Do not drive or operate machinery for 24 hours  - You are required upon discharge to leave the surgical center with a responsible adult who will drive you home    Surgery:  - Stay indoors for 2 days  - Continue weight bearing as tolerated - only use crutches for severe pain  - Stairs can be done one step at a time  - Keep clean and dry for 3 days  - Remove dressing in 3 days and cover wounds with band-aids, then you can shower  - If knee is swollen, ice for 10 minutes, 3 times daily while awake  - Ankle range of motion 10 times every hour when awake to both lower extremities for 3 days.  This increases circulation and decreases swelling and decreases the chance for clots  - Take pain medication as prescribed  - Resume regular diet  - Follow-up in approximately 1 week    FOR QUESTIONS OR CONCERNS, CALL [phone]    Notify your doctor if you develop: fever, chills, excessive sweating, drainage, pain not controlled by pain medication      IF AN EMERGENCY ARISES CALL [phone] AND/OR GO TO THE EMERGENCY ROOM    DR. CAMACHO  [phone]

## 2023-11-28 NOTE — CHART NOTE - NSCHARTNOTEFT_GEN_A_CORE
PACU ANESTHESIA ADMISSION NOTE      Procedure: Arthroscopic partial medial meniscectomy of right knee    Arthroscopic partial lateral meniscectomy of right knee      Post op diagnosis:  Tear of medial meniscus of right knee    Tear of lateral meniscus of right knee        ____  Intubated  TV:______       Rate: ______      FiO2: ______    _X___  Patent Airway    _X___  Full return of protective reflexes    ____  Full recovery from anesthesia / back to baseline status    Vitals:  T: 98F  HR: 78  BP: 138/61  RR: 17  SpO2: 96%    Mental Status:  _X___ Awake   _____ Alert   _____ Drowsy   _____ Sedated    Nausea/Vomiting:  _X___ NO  ______Yes,   See Post - Op Orders          Pain Scale (0-10):  _____    Treatment: ____ None    _X___ See Post - Op/PCA Orders    Post - Operative Fluids:   ____ Oral   _X___ See Post - Op Orders    Plan: Discharge:   X____Home       _____Floor     _____Critical Care    _____  Other:_________________    Comments: NO anesthetic related complications noted. pt. transported to PACU, report endorsed to RN

## 2023-11-28 NOTE — ASU DISCHARGE PLAN (ADULT/PEDIATRIC) - PROCEDURE
Right knee arthroscopy partial : right knee  medial,partial lateral meniscectomy Right knee arthroscopy partial medial,partial lateral meniscectomy

## 2023-11-28 NOTE — ASU DISCHARGE PLAN (ADULT/PEDIATRIC) - CARE PROVIDER_API CALL
Willie Baker  Orthopaedic Surgery  [address] [place] [address]  Phone: [phone]  Fax: [phone]  Established Patient  Follow Up Time:

## 2023-11-28 NOTE — BRIEF OPERATIVE NOTE - NSICDXBRIEFPROCEDURE_GEN_ALL_CORE_FT
PROCEDURES:  Arthroscopic partial medial meniscectomy of right knee 28-Nov-2023 13:14:30  Jas Bender  Arthroscopic partial lateral meniscectomy of right knee 28-Nov-2023 13:14:44  Jas Bender

## 2023-11-28 NOTE — BRIEF OPERATIVE NOTE - NSICDXBRIEFPREOP_GEN_ALL_CORE_FT
PRE-OP DIAGNOSIS:  Tear of medial meniscus of right knee 28-Nov-2023 13:15:11  Jas Bender  Tear of lateral meniscus of right knee 28-Nov-2023 13:15:23  Jas Bender

## 2023-12-03 DIAGNOSIS — I48.0 PAROXYSMAL ATRIAL FIBRILLATION: ICD-10-CM

## 2023-12-03 DIAGNOSIS — I25.10 ATHEROSCLEROTIC HEART DISEASE OF NATIVE CORONARY ARTERY WITHOUT ANGINA PECTORIS: ICD-10-CM
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
## 2023-12-03 DIAGNOSIS — S83.281A OTHER TEAR OF LATERAL MENISCUS, CURRENT INJURY, RIGHT KNEE, INITIAL ENCOUNTER: ICD-10-CM

## 2023-12-03 DIAGNOSIS — M94.261 CHONDROMALACIA, RIGHT KNEE: ICD-10-CM

## 2023-12-03 DIAGNOSIS — S83.241A OTHER TEAR OF MEDIAL MENISCUS, CURRENT INJURY, RIGHT KNEE, INITIAL ENCOUNTER: ICD-10-CM

## 2023-12-03 DIAGNOSIS — E78.5 HYPERLIPIDEMIA, UNSPECIFIED: ICD-10-CM

## 2023-12-03 DIAGNOSIS — G47.33 OBSTRUCTIVE SLEEP APNEA (ADULT) (PEDIATRIC): ICD-10-CM

## 2023-12-03 DIAGNOSIS — I10 ESSENTIAL (PRIMARY) HYPERTENSION: ICD-10-CM
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
## 2023-12-03 DIAGNOSIS — Z79.84 LONG TERM (CURRENT) USE OF ORAL HYPOGLYCEMIC DRUGS: ICD-10-CM

## 2023-12-03 DIAGNOSIS — Z79.01 LONG TERM (CURRENT) USE OF ANTICOAGULANTS: ICD-10-CM

## 2023-12-03 DIAGNOSIS — E03.9 HYPOTHYROIDISM, UNSPECIFIED: ICD-10-CM

## 2023-12-04 ENCOUNTER — RX RENEWAL (OUTPATIENT)
Age: 78
End: 2023-12-04

## 2023-12-04 RX ORDER — RIVAROXABAN 20 MG/1
20 TABLET, FILM COATED ORAL
Qty: 90 | Refills: 3 | Status: ACTIVE | COMMUNITY
Start: 2020-10-05 | End: 1900-01-01

## 2023-12-07 ENCOUNTER — APPOINTMENT (OUTPATIENT)
Dept: ORTHOPEDIC SURGERY | Facility: CLINIC | Age: 78
End: 2023-12-07
Payer: MEDICARE

## 2023-12-07 PROCEDURE — 99024 POSTOP FOLLOW-UP VISIT: CPT

## 2023-12-14 ENCOUNTER — OUTPATIENT (OUTPATIENT)
Dept: OUTPATIENT SERVICES | Facility: HOSPITAL | Age: 78
LOS: 1 days | End: 2023-12-14
Payer: MEDICARE

## 2023-12-14 VITALS
RESPIRATION RATE: 14 BRPM | DIASTOLIC BLOOD PRESSURE: 79 MMHG | OXYGEN SATURATION: 97 % | TEMPERATURE: 98 F | SYSTOLIC BLOOD PRESSURE: 139 MMHG | WEIGHT: 248.9 LBS | HEIGHT: 71 IN | HEART RATE: 64 BPM

## 2023-12-14 DIAGNOSIS — Z98.890 OTHER SPECIFIED POSTPROCEDURAL STATES: Chronic | ICD-10-CM

## 2023-12-14 DIAGNOSIS — I25.10 ATHEROSCLEROTIC HEART DISEASE OF NATIVE CORONARY ARTERY WITHOUT ANGINA PECTORIS: Chronic | ICD-10-CM

## 2023-12-14 DIAGNOSIS — N20.0 CALCULUS OF KIDNEY: Chronic | ICD-10-CM

## 2023-12-14 DIAGNOSIS — Z01.818 ENCOUNTER FOR OTHER PREPROCEDURAL EXAMINATION: ICD-10-CM

## 2023-12-14 DIAGNOSIS — Z98.49 CATARACT EXTRACTION STATUS, UNSPECIFIED EYE: Chronic | ICD-10-CM

## 2023-12-14 DIAGNOSIS — Z00.8 ENCOUNTER FOR OTHER GENERAL EXAMINATION: ICD-10-CM

## 2023-12-14 DIAGNOSIS — N18.9 CHRONIC KIDNEY DISEASE, UNSPECIFIED: ICD-10-CM

## 2023-12-14 LAB
ALBUMIN SERPL ELPH-MCNC: 4.3 G/DL — SIGNIFICANT CHANGE UP (ref 3.5–5.2)
ALBUMIN SERPL ELPH-MCNC: 4.3 G/DL — SIGNIFICANT CHANGE UP (ref 3.5–5.2)
ALP SERPL-CCNC: 50 U/L — SIGNIFICANT CHANGE UP (ref 30–115)
ALP SERPL-CCNC: 50 U/L — SIGNIFICANT CHANGE UP (ref 30–115)
ALT FLD-CCNC: 18 U/L — SIGNIFICANT CHANGE UP (ref 0–41)
ALT FLD-CCNC: 18 U/L — SIGNIFICANT CHANGE UP (ref 0–41)
ANION GAP SERPL CALC-SCNC: 14 MMOL/L — SIGNIFICANT CHANGE UP (ref 7–14)
ANION GAP SERPL CALC-SCNC: 14 MMOL/L — SIGNIFICANT CHANGE UP (ref 7–14)
APTT BLD: 37.4 SEC — SIGNIFICANT CHANGE UP (ref 27–39.2)
APTT BLD: 37.4 SEC — SIGNIFICANT CHANGE UP (ref 27–39.2)
AST SERPL-CCNC: 19 U/L — SIGNIFICANT CHANGE UP (ref 0–41)
AST SERPL-CCNC: 19 U/L — SIGNIFICANT CHANGE UP (ref 0–41)
BASOPHILS # BLD AUTO: 0.02 K/UL — SIGNIFICANT CHANGE UP (ref 0–0.2)
BASOPHILS # BLD AUTO: 0.02 K/UL — SIGNIFICANT CHANGE UP (ref 0–0.2)
BASOPHILS NFR BLD AUTO: 0.4 % — SIGNIFICANT CHANGE UP (ref 0–1)
BASOPHILS NFR BLD AUTO: 0.4 % — SIGNIFICANT CHANGE UP (ref 0–1)
BILIRUB SERPL-MCNC: 0.5 MG/DL — SIGNIFICANT CHANGE UP (ref 0.2–1.2)
BILIRUB SERPL-MCNC: 0.5 MG/DL — SIGNIFICANT CHANGE UP (ref 0.2–1.2)
BUN SERPL-MCNC: 18 MG/DL — SIGNIFICANT CHANGE UP (ref 10–20)
BUN SERPL-MCNC: 18 MG/DL — SIGNIFICANT CHANGE UP (ref 10–20)
CALCIUM SERPL-MCNC: 9.3 MG/DL — SIGNIFICANT CHANGE UP (ref 8.4–10.5)
CALCIUM SERPL-MCNC: 9.3 MG/DL — SIGNIFICANT CHANGE UP (ref 8.4–10.5)
CHLORIDE SERPL-SCNC: 102 MMOL/L — SIGNIFICANT CHANGE UP (ref 98–110)
CHLORIDE SERPL-SCNC: 102 MMOL/L — SIGNIFICANT CHANGE UP (ref 98–110)
CO2 SERPL-SCNC: 25 MMOL/L — SIGNIFICANT CHANGE UP (ref 17–32)
CO2 SERPL-SCNC: 25 MMOL/L — SIGNIFICANT CHANGE UP (ref 17–32)
CREAT SERPL-MCNC: 1.1 MG/DL — SIGNIFICANT CHANGE UP (ref 0.7–1.5)
CREAT SERPL-MCNC: 1.1 MG/DL — SIGNIFICANT CHANGE UP (ref 0.7–1.5)
EGFR: 70 ML/MIN/1.73M2 — SIGNIFICANT CHANGE UP
EGFR: 70 ML/MIN/1.73M2 — SIGNIFICANT CHANGE UP
EOSINOPHIL # BLD AUTO: 0.04 K/UL — SIGNIFICANT CHANGE UP (ref 0–0.7)
EOSINOPHIL # BLD AUTO: 0.04 K/UL — SIGNIFICANT CHANGE UP (ref 0–0.7)
EOSINOPHIL NFR BLD AUTO: 0.7 % — SIGNIFICANT CHANGE UP (ref 0–8)
EOSINOPHIL NFR BLD AUTO: 0.7 % — SIGNIFICANT CHANGE UP (ref 0–8)
GLUCOSE SERPL-MCNC: 93 MG/DL — SIGNIFICANT CHANGE UP (ref 70–99)
GLUCOSE SERPL-MCNC: 93 MG/DL — SIGNIFICANT CHANGE UP (ref 70–99)
HCT VFR BLD CALC: 36.8 % — LOW (ref 42–52)
HCT VFR BLD CALC: 36.8 % — LOW (ref 42–52)
HGB BLD-MCNC: 12.6 G/DL — LOW (ref 14–18)
HGB BLD-MCNC: 12.6 G/DL — LOW (ref 14–18)
IMM GRANULOCYTES NFR BLD AUTO: 0.2 % — SIGNIFICANT CHANGE UP (ref 0.1–0.3)
IMM GRANULOCYTES NFR BLD AUTO: 0.2 % — SIGNIFICANT CHANGE UP (ref 0.1–0.3)
INR BLD: 1.74 RATIO — HIGH (ref 0.65–1.3)
INR BLD: 1.74 RATIO — HIGH (ref 0.65–1.3)
LYMPHOCYTES # BLD AUTO: 1.65 K/UL — SIGNIFICANT CHANGE UP (ref 1.2–3.4)
LYMPHOCYTES # BLD AUTO: 1.65 K/UL — SIGNIFICANT CHANGE UP (ref 1.2–3.4)
LYMPHOCYTES # BLD AUTO: 30.1 % — SIGNIFICANT CHANGE UP (ref 20.5–51.1)
LYMPHOCYTES # BLD AUTO: 30.1 % — SIGNIFICANT CHANGE UP (ref 20.5–51.1)
MCHC RBC-ENTMCNC: 29.7 PG — SIGNIFICANT CHANGE UP (ref 27–31)
MCHC RBC-ENTMCNC: 29.7 PG — SIGNIFICANT CHANGE UP (ref 27–31)
MCHC RBC-ENTMCNC: 34.2 G/DL — SIGNIFICANT CHANGE UP (ref 32–37)
MCHC RBC-ENTMCNC: 34.2 G/DL — SIGNIFICANT CHANGE UP (ref 32–37)
MCV RBC AUTO: 86.8 FL — SIGNIFICANT CHANGE UP (ref 80–94)
MCV RBC AUTO: 86.8 FL — SIGNIFICANT CHANGE UP (ref 80–94)
MONOCYTES # BLD AUTO: 0.41 K/UL — SIGNIFICANT CHANGE UP (ref 0.1–0.6)
MONOCYTES # BLD AUTO: 0.41 K/UL — SIGNIFICANT CHANGE UP (ref 0.1–0.6)
MONOCYTES NFR BLD AUTO: 7.5 % — SIGNIFICANT CHANGE UP (ref 1.7–9.3)
MONOCYTES NFR BLD AUTO: 7.5 % — SIGNIFICANT CHANGE UP (ref 1.7–9.3)
NEUTROPHILS # BLD AUTO: 3.35 K/UL — SIGNIFICANT CHANGE UP (ref 1.4–6.5)
NEUTROPHILS # BLD AUTO: 3.35 K/UL — SIGNIFICANT CHANGE UP (ref 1.4–6.5)
NEUTROPHILS NFR BLD AUTO: 61.1 % — SIGNIFICANT CHANGE UP (ref 42.2–75.2)
NEUTROPHILS NFR BLD AUTO: 61.1 % — SIGNIFICANT CHANGE UP (ref 42.2–75.2)
NRBC # BLD: 0 /100 WBCS — SIGNIFICANT CHANGE UP (ref 0–0)
NRBC # BLD: 0 /100 WBCS — SIGNIFICANT CHANGE UP (ref 0–0)
PLATELET # BLD AUTO: 156 K/UL — SIGNIFICANT CHANGE UP (ref 130–400)
PLATELET # BLD AUTO: 156 K/UL — SIGNIFICANT CHANGE UP (ref 130–400)
PMV BLD: 9.9 FL — SIGNIFICANT CHANGE UP (ref 7.4–10.4)
PMV BLD: 9.9 FL — SIGNIFICANT CHANGE UP (ref 7.4–10.4)
POTASSIUM SERPL-MCNC: 4.4 MMOL/L — SIGNIFICANT CHANGE UP (ref 3.5–5)
POTASSIUM SERPL-MCNC: 4.4 MMOL/L — SIGNIFICANT CHANGE UP (ref 3.5–5)
POTASSIUM SERPL-SCNC: 4.4 MMOL/L — SIGNIFICANT CHANGE UP (ref 3.5–5)
POTASSIUM SERPL-SCNC: 4.4 MMOL/L — SIGNIFICANT CHANGE UP (ref 3.5–5)
PROT SERPL-MCNC: 6.8 G/DL — SIGNIFICANT CHANGE UP (ref 6–8)
PROT SERPL-MCNC: 6.8 G/DL — SIGNIFICANT CHANGE UP (ref 6–8)
PROTHROM AB SERPL-ACNC: 20 SEC — HIGH (ref 9.95–12.87)
PROTHROM AB SERPL-ACNC: 20 SEC — HIGH (ref 9.95–12.87)
RBC # BLD: 4.24 M/UL — LOW (ref 4.7–6.1)
RBC # BLD: 4.24 M/UL — LOW (ref 4.7–6.1)
RBC # FLD: 14.6 % — HIGH (ref 11.5–14.5)
RBC # FLD: 14.6 % — HIGH (ref 11.5–14.5)
SODIUM SERPL-SCNC: 141 MMOL/L — SIGNIFICANT CHANGE UP (ref 135–146)
SODIUM SERPL-SCNC: 141 MMOL/L — SIGNIFICANT CHANGE UP (ref 135–146)
WBC # BLD: 5.48 K/UL — SIGNIFICANT CHANGE UP (ref 4.8–10.8)
WBC # BLD: 5.48 K/UL — SIGNIFICANT CHANGE UP (ref 4.8–10.8)
WBC # FLD AUTO: 5.48 K/UL — SIGNIFICANT CHANGE UP (ref 4.8–10.8)
WBC # FLD AUTO: 5.48 K/UL — SIGNIFICANT CHANGE UP (ref 4.8–10.8)

## 2023-12-14 PROCEDURE — 85730 THROMBOPLASTIN TIME PARTIAL: CPT

## 2023-12-14 PROCEDURE — 85025 COMPLETE CBC W/AUTO DIFF WBC: CPT

## 2023-12-14 PROCEDURE — 85610 PROTHROMBIN TIME: CPT

## 2023-12-14 PROCEDURE — 36415 COLL VENOUS BLD VENIPUNCTURE: CPT

## 2023-12-14 PROCEDURE — 99214 OFFICE O/P EST MOD 30 MIN: CPT | Mod: 25

## 2023-12-14 PROCEDURE — 80053 COMPREHEN METABOLIC PANEL: CPT

## 2023-12-14 RX ORDER — METOPROLOL TARTRATE 50 MG
1 TABLET ORAL
Qty: 0 | Refills: 0 | DISCHARGE

## 2023-12-14 RX ORDER — ACETAMINOPHEN 500 MG
2 TABLET ORAL
Refills: 0 | DISCHARGE

## 2023-12-14 RX ORDER — OMEPRAZOLE 10 MG/1
1 CAPSULE, DELAYED RELEASE ORAL
Qty: 0 | Refills: 0 | DISCHARGE

## 2023-12-14 RX ORDER — PREGABALIN 225 MG/1
1 CAPSULE ORAL
Refills: 0 | DISCHARGE

## 2023-12-14 RX ORDER — RIVAROXABAN 15 MG-20MG
1 KIT ORAL
Refills: 0 | DISCHARGE

## 2023-12-14 RX ORDER — ROSUVASTATIN CALCIUM 5 MG/1
1 TABLET ORAL
Qty: 0 | Refills: 0 | DISCHARGE

## 2023-12-14 RX ORDER — VALSARTAN 80 MG/1
1 TABLET ORAL
Qty: 0 | Refills: 0 | DISCHARGE

## 2023-12-14 RX ORDER — FENOFIBRATE,MICRONIZED 130 MG
0 CAPSULE ORAL
Qty: 0 | Refills: 0 | DISCHARGE

## 2023-12-14 RX ORDER — TRAMADOL HYDROCHLORIDE 50 MG/1
1 TABLET ORAL
Refills: 0 | DISCHARGE

## 2023-12-14 RX ORDER — FAMOTIDINE 10 MG/ML
1 INJECTION INTRAVENOUS
Qty: 0 | Refills: 0 | DISCHARGE

## 2023-12-14 RX ORDER — TAMSULOSIN HYDROCHLORIDE 0.4 MG/1
1 CAPSULE ORAL
Qty: 0 | Refills: 0 | DISCHARGE

## 2023-12-14 RX ORDER — METFORMIN HYDROCHLORIDE 850 MG/1
1 TABLET ORAL
Refills: 0 | DISCHARGE

## 2023-12-14 RX ORDER — FUROSEMIDE 40 MG
1 TABLET ORAL
Qty: 0 | Refills: 0 | DISCHARGE

## 2023-12-14 RX ORDER — LEVOTHYROXINE SODIUM 125 MCG
1 TABLET ORAL
Qty: 0 | Refills: 0 | DISCHARGE

## 2023-12-14 RX ORDER — FINASTERIDE 5 MG/1
1 TABLET, FILM COATED ORAL
Refills: 0 | DISCHARGE

## 2023-12-14 NOTE — H&P PST ADULT - REASON FOR ADMISSION
77 y/o male presents for PAST in preparation for MRI of the abdomen with or without contrast under anesthesia on 12/20/2023 under general anesthesia with Dr. Becerril in Radiology. 79 y/o male presents for PAST in preparation for MRI of the abdomen with or without contrast under anesthesia on 12/20/2023 under general anesthesia with Dr. Becerril in Radiology.

## 2023-12-14 NOTE — H&P PST ADULT - HISTORY OF PRESENT ILLNESS
Pt with complains of abdominal pain and discomfort, unable to tolerated MRI of the abdomen without sedation. Now scheduled for MRI of the abdomen with or without contrast under anesthesia on 12/20/2023 under general anesthesia with Dr. Becerril in Radiology.    Denies any chest pain, difficulty breathing, SOB, palpitations, dysuria, URI, or any other infections in the last 2 weeks/1 month. Denies any recent travel, contact, or exposure to any persons with known or suspected COVID-19. Pt also denies COVID testing within the last 2 weeks. Pt admits to receiving all doses of COVID vaccine. Denies any suicidal or homicidal ideations. Pt advised to self quarantine until day of procedure. Exercise tolerance of 2-3 flights of stairs without dyspnea. ETELVINA reviewed with patient. Pt verbalized understanding of all pre-operative instructions.    Anesthesia Alert  NO--Difficult Airway CLASS II  NO--History of neck surgery or radiation  NO--Limited ROM of neck  NO--History of Malignant hyperthermia  NO--No personal or family history of Pseudocholinesterase deficiency.  NO--Prior Anesthesia Complication  NO--Latex Allergy  NO--Loose teeth  NO--History of Rheumatoid Arthritis  YES--ETELVINA on CPAP   YES--Bleeding Risk Eliquis  NO--Other_____    D[facility] Activity Status Index (DASI) from [employer]  on 12/14/2023  ** All calculations should be rechecked by clinician prior to use **    RESULT SUMMARY:  20.7 points  The higher the score (maximum 58.2), the higher the functional status.    5.29 METs        INPUTS:  Take care of self —> 2.75 = Yes  Walk indoors —> 1.75 = Yes  Walk 1&ndash;2 blocks on level ground —> 2.75 = Yes  Climb a flight of stairs or walk up a hill —> 5.5 = Yes  Run a short distance —> 0 = No  Do light work around the house —> 2.7 = Yes  Do moderate work around the house —> 0 = No  Do heavy work around the house —> 0 = No  Do yardwork —> 0 = No  Have sexual relations —> 5.25 = Yes  Participate in moderate recreational activities —> 0 = No  Participate in strenuous sports —> 0 = No  Revised Cardiac Risk Index for Pre-Operative Risk from [employer]  on 12/14/2023  ** All calculations should be rechecked by clinician prior to use **    RESULT SUMMARY:  2 points  Class III Risk    10.1 %  30-day risk of death, MI, or cardiac arrest    From [employer] 2017. These numbers are higher than those from the original study (Tank 1999). See Evidence for details.      INPUTS:  Elevated-risk surgery —> 0 = No  History of ischemic heart disease —> 1 = Yes  History of congestive heart failure —> 1 = Yes  History of cerebrovascular disease —> 0 = No  Pre-operative treatment with insulin —> 0 = No  Pre-operative creatinine >2 mg/dL / 176.8 µmol/L —> 0 = No   Pt with complains of abdominal pain and discomfort, unable to tolerated MRI of the abdomen without sedation. Now scheduled for MRI of the abdomen with or without contrast under anesthesia on 12/20/2023 under general anesthesia with Dr. Becerril in Radiology.    Denies any chest pain, difficulty breathing, SOB, palpitations, dysuria, URI, or any other infections in the last 2 weeks/1 month. Denies any recent travel, contact, or exposure to any persons with known or suspected COVID-19. Pt also denies COVID testing within the last 2 weeks. Pt admits to receiving all doses of COVID vaccine. Denies any suicidal or homicidal ideations. Pt advised to self quarantine until day of procedure. Exercise tolerance of 2-3 flights of stairs without dyspnea. ETELVINA reviewed with patient. Pt verbalized understanding of all pre-operative instructions.    Anesthesia Alert  NO--Difficult Airway CLASS II  NO--History of neck surgery or radiation  NO--Limited ROM of neck  NO--History of Malignant hyperthermia  NO--No personal or family history of Pseudocholinesterase deficiency.  NO--Prior Anesthesia Complication  NO--Latex Allergy  NO--Loose teeth  NO--History of Rheumatoid Arthritis  YES--ETELVINA on CPAP   YES--Bleeding Risk Eliquis  NO--Other_____    D[facility] Activity Status Index (DASI) from [employer]  on 12/14/2023  ** All calculations should be rechecked by clinician prior to use **    RESULT SUMMARY:  20.7 points  The higher the score (maximum 58.2), the higher the functional status.    5.29 METs        INPUTS:  Take care of self —> 2.75 = Yes  Walk indoors —> 1.75 = Yes  Walk 1&ndash;2 blocks on level ground —> 2.75 = Yes  Climb a flight of stairs or walk up a hill —> 5.5 = Yes  Run a short distance —> 0 = No  Do light work around the house —> 2.7 = Yes  Do moderate work around the house —> 0 = No  Do heavy work around the house —> 0 = No  Do yardwork —> 0 = No  Have sexual relations —> 5.25 = Yes  Participate in moderate recreational activities —> 0 = No  Participate in strenuous sports —> 0 = No  Revised Cardiac Risk Index for Pre-Operative Risk from [employer]  on 12/14/2023  ** All calculations should be rechecked by clinician prior to use **    RESULT SUMMARY:  2 points  Class III Risk    10.1 %  30-day risk of death, MI, or cardiac arrest    From [employer] 2017. These numbers are higher than those from the original study (Tank 1999). See Evidence for details.      INPUTS:  Elevated-risk surgery —> 0 = No  History of ischemic heart disease —> 1 = Yes  History of congestive heart failure —> 1 = Yes  History of cerebrovascular disease —> 0 = No  Pre-operative treatment with insulin —> 0 = No  Pre-operative creatinine >2 mg/dL / 176.8 µmol/L —> 0 = No

## 2023-12-15 DIAGNOSIS — N18.9 CHRONIC KIDNEY DISEASE, UNSPECIFIED: ICD-10-CM

## 2023-12-15 DIAGNOSIS — Z01.818 ENCOUNTER FOR OTHER PREPROCEDURAL EXAMINATION: ICD-10-CM

## 2023-12-20 ENCOUNTER — OUTPATIENT (OUTPATIENT)
Dept: OUTPATIENT SERVICES | Facility: HOSPITAL | Age: 78
LOS: 1 days | End: 2023-12-20
Payer: MEDICARE

## 2023-12-20 ENCOUNTER — RX RENEWAL (OUTPATIENT)
Age: 78
End: 2023-12-20

## 2023-12-20 VITALS
OXYGEN SATURATION: 99 % | TEMPERATURE: 97 F | SYSTOLIC BLOOD PRESSURE: 141 MMHG | WEIGHT: 250 LBS | RESPIRATION RATE: 18 BRPM | DIASTOLIC BLOOD PRESSURE: 62 MMHG | HEART RATE: 68 BPM | HEIGHT: 71 IN

## 2023-12-20 VITALS
HEART RATE: 60 BPM | OXYGEN SATURATION: 98 % | RESPIRATION RATE: 16 BRPM | DIASTOLIC BLOOD PRESSURE: 65 MMHG | SYSTOLIC BLOOD PRESSURE: 108 MMHG

## 2023-12-20 DIAGNOSIS — Z98.890 OTHER SPECIFIED POSTPROCEDURAL STATES: Chronic | ICD-10-CM

## 2023-12-20 DIAGNOSIS — N20.0 CALCULUS OF KIDNEY: Chronic | ICD-10-CM

## 2023-12-20 DIAGNOSIS — Z00.8 ENCOUNTER FOR OTHER GENERAL EXAMINATION: ICD-10-CM

## 2023-12-20 DIAGNOSIS — R93.429 ABNORMAL RADIOLOGIC FINDINGS ON DIAGNOSTIC IMAGING OF UNSPECIFIED KIDNEY: ICD-10-CM

## 2023-12-20 DIAGNOSIS — I25.10 ATHEROSCLEROTIC HEART DISEASE OF NATIVE CORONARY ARTERY WITHOUT ANGINA PECTORIS: Chronic | ICD-10-CM

## 2023-12-20 DIAGNOSIS — Z98.49 CATARACT EXTRACTION STATUS, UNSPECIFIED EYE: Chronic | ICD-10-CM

## 2023-12-20 PROCEDURE — 74181 MRI ABDOMEN W/O CONTRAST: CPT

## 2023-12-20 PROCEDURE — A9579: CPT

## 2023-12-20 PROCEDURE — 74181 MRI ABDOMEN W/O CONTRAST: CPT | Mod: 26,MH

## 2023-12-20 RX ORDER — FUROSEMIDE 40 MG/1
40 TABLET ORAL
Qty: 90 | Refills: 3 | Status: ACTIVE | COMMUNITY
Start: 2021-04-19 | End: 1900-01-01

## 2023-12-21 DIAGNOSIS — R93.429 ABNORMAL RADIOLOGIC FINDINGS ON DIAGNOSTIC IMAGING OF UNSPECIFIED KIDNEY: ICD-10-CM

## 2024-01-08 ENCOUNTER — RX RENEWAL (OUTPATIENT)
Age: 79
End: 2024-01-08

## 2024-01-08 RX ORDER — VALSARTAN 160 MG/1
160 TABLET, COATED ORAL
Qty: 90 | Refills: 3 | Status: ACTIVE | COMMUNITY
Start: 2020-09-13 | End: 1900-01-01

## 2024-01-10 ENCOUNTER — APPOINTMENT (OUTPATIENT)
Dept: PULMONOLOGY | Facility: CLINIC | Age: 79
End: 2024-01-10
Payer: MEDICARE

## 2024-01-10 VITALS
DIASTOLIC BLOOD PRESSURE: 70 MMHG | OXYGEN SATURATION: 94 % | HEART RATE: 81 BPM | BODY MASS INDEX: 34.87 KG/M2 | SYSTOLIC BLOOD PRESSURE: 130 MMHG | WEIGHT: 250 LBS

## 2024-01-10 DIAGNOSIS — G47.33 OBSTRUCTIVE SLEEP APNEA (ADULT) (PEDIATRIC): ICD-10-CM

## 2024-01-10 DIAGNOSIS — E66.9 OBESITY, UNSPECIFIED: ICD-10-CM

## 2024-01-10 PROCEDURE — 99213 OFFICE O/P EST LOW 20 MIN: CPT

## 2024-01-10 NOTE — ASSESSMENT
[FreeTextEntry1] : Assessment: ETELVINA Obesity EDS controlled   PLAN: The patient is benefitting from the BiPAP device.  The pressure was turned down to 12/8 He wishes to continue to try to lose weight and then we will arrange for split-night test. New supplies will be ordered when required. Weight loss maintenance discussed. I stressed the need maintain compliance with the PAP device. The patient is not to use an Ozone or UV sterilizer. F/U in 6months

## 2024-01-10 NOTE — REASON FOR VISIT
[Follow-Up] : a follow-up visit [Sleep Apnea] : sleep apnea [TextBox_44] : continues to loose weight. Will turn down pressure on the BiPAP.

## 2024-01-18 ENCOUNTER — APPOINTMENT (OUTPATIENT)
Dept: ORTHOPEDIC SURGERY | Facility: CLINIC | Age: 79
End: 2024-01-18
Payer: MEDICARE

## 2024-01-18 PROCEDURE — 99213 OFFICE O/P EST LOW 20 MIN: CPT

## 2024-01-18 NOTE — DISCUSSION/SUMMARY
[de-identified] : Right knee pain follow-up  HPI Patient is a 78-year-old male who reports to the office for subsequent reevaluation of his right knee pain.  He had a right knee arthroscopy/meniscectomy done on 11/28/2023 by Dr. Baker.  His pain has been intermittent.  Certain range of motion, walk, and palpating certain areas of the knee aggravate the patient's pain at times.  Denies any numbness or tingling.  Right knee exam is as follows: Minimal effusion noted.  No erythema or ecchymosis.  Well-healed incision sites.  Able to perform active straight leg raise.  Knee flexion from 0 to 120 degrees.  Patellofemoral tenderness to palpation.  Calf is soft and nontender.  Negative Miriam's.  Light touch intact throughout.  Mild antalgic gait.  Assessment/plan Patient is interested in viscosupplementation injections.  He kindly declined a cortisone injection.  Right knee Synvisc 3 gel series ordered for the patient so he may receive that at his next visit.  Follow-up in 6 weeks.  All the patient's questions/concerns were answered in detail.

## 2024-02-06 ENCOUNTER — EMERGENCY (EMERGENCY)
Facility: HOSPITAL | Age: 79
LOS: 0 days | Discharge: HOME | End: 2024-02-06
Attending: EMERGENCY MEDICINE
Payer: MEDICARE

## 2024-02-06 VITALS
SYSTOLIC BLOOD PRESSURE: 108 MMHG | RESPIRATION RATE: 18 BRPM | HEART RATE: 67 BPM | OXYGEN SATURATION: 98 % | TEMPERATURE: 98 F | DIASTOLIC BLOOD PRESSURE: 67 MMHG | HEIGHT: 71 IN | WEIGHT: 246.92 LBS

## 2024-02-06 DIAGNOSIS — I10 ESSENTIAL (PRIMARY) HYPERTENSION: ICD-10-CM

## 2024-02-06 DIAGNOSIS — N20.0 CALCULUS OF KIDNEY: Chronic | ICD-10-CM

## 2024-02-06 DIAGNOSIS — Z86.39 PERSONAL HISTORY OF OTHER ENDOCRINE, NUTRITIONAL AND METABOLIC DISEASE: ICD-10-CM

## 2024-02-06 DIAGNOSIS — I48.91 UNSPECIFIED ATRIAL FIBRILLATION: ICD-10-CM

## 2024-02-06 DIAGNOSIS — I25.10 ATHEROSCLEROTIC HEART DISEASE OF NATIVE CORONARY ARTERY WITHOUT ANGINA PECTORIS: ICD-10-CM

## 2024-02-06 DIAGNOSIS — E78.5 HYPERLIPIDEMIA, UNSPECIFIED: ICD-10-CM

## 2024-02-06 DIAGNOSIS — Z98.890 OTHER SPECIFIED POSTPROCEDURAL STATES: Chronic | ICD-10-CM

## 2024-02-06 DIAGNOSIS — Z79.01 LONG TERM (CURRENT) USE OF ANTICOAGULANTS: ICD-10-CM

## 2024-02-06 DIAGNOSIS — Z98.49 CATARACT EXTRACTION STATUS, UNSPECIFIED EYE: Chronic | ICD-10-CM
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
## 2024-02-06 DIAGNOSIS — S09.90XA UNSPECIFIED INJURY OF HEAD, INITIAL ENCOUNTER: ICD-10-CM

## 2024-02-06 DIAGNOSIS — R07.89 OTHER CHEST PAIN: ICD-10-CM

## 2024-02-06 DIAGNOSIS — Z95.5 PRESENCE OF CORONARY ANGIOPLASTY IMPLANT AND GRAFT: ICD-10-CM

## 2024-02-06 DIAGNOSIS — E11.9 TYPE 2 DIABETES MELLITUS WITHOUT COMPLICATIONS: ICD-10-CM
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
## 2024-02-06 DIAGNOSIS — M54.50 LOW BACK PAIN, UNSPECIFIED: ICD-10-CM

## 2024-02-06 DIAGNOSIS — N40.0 BENIGN PROSTATIC HYPERPLASIA WITHOUT LOWER URINARY TRACT SYMPTOMS: ICD-10-CM

## 2024-02-06 DIAGNOSIS — Z88.1 ALLERGY STATUS TO OTHER ANTIBIOTIC AGENTS STATUS: ICD-10-CM

## 2024-02-06 DIAGNOSIS — I25.10 ATHEROSCLEROTIC HEART DISEASE OF NATIVE CORONARY ARTERY WITHOUT ANGINA PECTORIS: Chronic | ICD-10-CM

## 2024-02-06 DIAGNOSIS — S00.81XA ABRASION OF OTHER PART OF HEAD, INITIAL ENCOUNTER: ICD-10-CM

## 2024-02-06 LAB
ALBUMIN SERPL ELPH-MCNC: 4.2 G/DL — SIGNIFICANT CHANGE UP (ref 3.5–5.2)
ALP SERPL-CCNC: 43 U/L — SIGNIFICANT CHANGE UP (ref 30–115)
ALT FLD-CCNC: 15 U/L — SIGNIFICANT CHANGE UP (ref 0–41)
ANION GAP SERPL CALC-SCNC: 13 MMOL/L — SIGNIFICANT CHANGE UP (ref 7–14)
APTT BLD: 36.6 SEC — SIGNIFICANT CHANGE UP (ref 27–39.2)
AST SERPL-CCNC: 27 U/L — SIGNIFICANT CHANGE UP (ref 0–41)
BASOPHILS # BLD AUTO: 0.02 K/UL — SIGNIFICANT CHANGE UP (ref 0–0.2)
BASOPHILS NFR BLD AUTO: 0.3 % — SIGNIFICANT CHANGE UP (ref 0–1)
BILIRUB SERPL-MCNC: 0.5 MG/DL — SIGNIFICANT CHANGE UP (ref 0.2–1.2)
BUN SERPL-MCNC: 24 MG/DL — HIGH (ref 10–20)
CALCIUM SERPL-MCNC: 9.9 MG/DL — SIGNIFICANT CHANGE UP (ref 8.4–10.5)
CHLORIDE SERPL-SCNC: 98 MMOL/L — SIGNIFICANT CHANGE UP (ref 98–110)
CO2 SERPL-SCNC: 24 MMOL/L — SIGNIFICANT CHANGE UP (ref 17–32)
CREAT SERPL-MCNC: 1.1 MG/DL — SIGNIFICANT CHANGE UP (ref 0.7–1.5)
EGFR: 70 ML/MIN/1.73M2 — SIGNIFICANT CHANGE UP
EOSINOPHIL # BLD AUTO: 0.09 K/UL — SIGNIFICANT CHANGE UP (ref 0–0.7)
EOSINOPHIL NFR BLD AUTO: 1.3 % — SIGNIFICANT CHANGE UP (ref 0–8)
ETHANOL SERPL-MCNC: <10 MG/DL — SIGNIFICANT CHANGE UP
GLUCOSE SERPL-MCNC: 119 MG/DL — HIGH (ref 70–99)
HCT VFR BLD CALC: 37.7 % — LOW (ref 42–52)
HGB BLD-MCNC: 12.8 G/DL — LOW (ref 14–18)
IMM GRANULOCYTES NFR BLD AUTO: 0.3 % — SIGNIFICANT CHANGE UP (ref 0.1–0.3)
INR BLD: 1.85 RATIO — HIGH (ref 0.65–1.3)
LACTATE SERPL-SCNC: 1.6 MMOL/L — SIGNIFICANT CHANGE UP (ref 0.7–2)
LIDOCAIN IGE QN: 27 U/L — SIGNIFICANT CHANGE UP (ref 7–60)
LYMPHOCYTES # BLD AUTO: 1.45 K/UL — SIGNIFICANT CHANGE UP (ref 1.2–3.4)
LYMPHOCYTES # BLD AUTO: 21.6 % — SIGNIFICANT CHANGE UP (ref 20.5–51.1)
MCHC RBC-ENTMCNC: 30 PG — SIGNIFICANT CHANGE UP (ref 27–31)
MCHC RBC-ENTMCNC: 34 G/DL — SIGNIFICANT CHANGE UP (ref 32–37)
MCV RBC AUTO: 88.5 FL — SIGNIFICANT CHANGE UP (ref 80–94)
MONOCYTES # BLD AUTO: 0.51 K/UL — SIGNIFICANT CHANGE UP (ref 0.1–0.6)
MONOCYTES NFR BLD AUTO: 7.6 % — SIGNIFICANT CHANGE UP (ref 1.7–9.3)
NEUTROPHILS # BLD AUTO: 4.62 K/UL — SIGNIFICANT CHANGE UP (ref 1.4–6.5)
NEUTROPHILS NFR BLD AUTO: 68.9 % — SIGNIFICANT CHANGE UP (ref 42.2–75.2)
NRBC # BLD: 0 /100 WBCS — SIGNIFICANT CHANGE UP (ref 0–0)
PLATELET # BLD AUTO: 129 K/UL — LOW (ref 130–400)
PMV BLD: 9.2 FL — SIGNIFICANT CHANGE UP (ref 7.4–10.4)
POTASSIUM SERPL-MCNC: 5.2 MMOL/L — HIGH (ref 3.5–5)
POTASSIUM SERPL-SCNC: 5.2 MMOL/L — HIGH (ref 3.5–5)
PROT SERPL-MCNC: 7.2 G/DL — SIGNIFICANT CHANGE UP (ref 6–8)
PROTHROM AB SERPL-ACNC: 21.2 SEC — HIGH (ref 9.95–12.87)
RBC # BLD: 4.26 M/UL — LOW (ref 4.7–6.1)
RBC # FLD: 14.3 % — SIGNIFICANT CHANGE UP (ref 11.5–14.5)
SODIUM SERPL-SCNC: 135 MMOL/L — SIGNIFICANT CHANGE UP (ref 135–146)
WBC # BLD: 6.71 K/UL — SIGNIFICANT CHANGE UP (ref 4.8–10.8)
WBC # FLD AUTO: 6.71 K/UL — SIGNIFICANT CHANGE UP (ref 4.8–10.8)

## 2024-02-06 PROCEDURE — 71260 CT THORAX DX C+: CPT | Mod: 26,MA

## 2024-02-06 PROCEDURE — 71260 CT THORAX DX C+: CPT | Mod: MA

## 2024-02-06 PROCEDURE — 83605 ASSAY OF LACTIC ACID: CPT

## 2024-02-06 PROCEDURE — 72170 X-RAY EXAM OF PELVIS: CPT | Mod: 26

## 2024-02-06 PROCEDURE — 85730 THROMBOPLASTIN TIME PARTIAL: CPT

## 2024-02-06 PROCEDURE — 71045 X-RAY EXAM CHEST 1 VIEW: CPT

## 2024-02-06 PROCEDURE — 74177 CT ABD & PELVIS W/CONTRAST: CPT | Mod: MA

## 2024-02-06 PROCEDURE — 72125 CT NECK SPINE W/O DYE: CPT | Mod: MA

## 2024-02-06 PROCEDURE — 72125 CT NECK SPINE W/O DYE: CPT | Mod: 26,MA

## 2024-02-06 PROCEDURE — 85610 PROTHROMBIN TIME: CPT

## 2024-02-06 PROCEDURE — 99284 EMERGENCY DEPT VISIT MOD MDM: CPT | Mod: 25

## 2024-02-06 PROCEDURE — 74177 CT ABD & PELVIS W/CONTRAST: CPT | Mod: 26,MA

## 2024-02-06 PROCEDURE — 83690 ASSAY OF LIPASE: CPT

## 2024-02-06 PROCEDURE — 99285 EMERGENCY DEPT VISIT HI MDM: CPT | Mod: FS

## 2024-02-06 PROCEDURE — 80053 COMPREHEN METABOLIC PANEL: CPT

## 2024-02-06 PROCEDURE — 85025 COMPLETE CBC W/AUTO DIFF WBC: CPT

## 2024-02-06 PROCEDURE — 71045 X-RAY EXAM CHEST 1 VIEW: CPT | Mod: 26

## 2024-02-06 PROCEDURE — 36415 COLL VENOUS BLD VENIPUNCTURE: CPT

## 2024-02-06 PROCEDURE — 70450 CT HEAD/BRAIN W/O DYE: CPT | Mod: MA

## 2024-02-06 PROCEDURE — 70450 CT HEAD/BRAIN W/O DYE: CPT | Mod: 26,MA

## 2024-02-06 PROCEDURE — 72170 X-RAY EXAM OF PELVIS: CPT

## 2024-02-06 PROCEDURE — 80307 DRUG TEST PRSMV CHEM ANLYZR: CPT

## 2024-02-06 NOTE — ED PROVIDER NOTE - PATIENT PORTAL LINK FT
You can access the FollowMyHealth Patient Portal offered by [facility] by registering at the following website: [email or web address]. By joining [facility]’s FollowMyHealth portal, you will also be able to view your health information using other applications (apps) compatible with our system.

## 2024-02-06 NOTE — ED PROVIDER NOTE - CARE PROVIDER_API CALL
Missy Corinth  Internal Medicine  [address] [place] [id number]  Phone: [phone]  Fax: [phone]  Follow Up Time: 1-3 Days

## 2024-02-06 NOTE — ED PROVIDER NOTE - CLINICAL SUMMARY MEDICAL DECISION MAKING FREE TEXT BOX
pt s/p trip and fall with chi. pan scan negative. pt stable for dc. abrasion to face no laceration no further tx. pt aware of lymph node. results given to pt

## 2024-02-06 NOTE — ED PROVIDER NOTE - PHYSICAL EXAMINATION
VITAL SIGNS: I have reviewed nursing notes and confirm.  CONSTITUTIONAL: Patient is in no acute distress.  SKIN: +Abrasion to left forehead.   HEAD: Normocephalic; atraumatic.  EYES: PERRL, EOM intact; conjunctiva and sclera clear.  ENT: No nasal discharge; airway clear.   NECK: Supple; non tender.  CARD: S1, S2 normal; no murmurs, gallops, or rubs. Regular rate and rhythm.  RESP: Clear to auscultation bilaterally. No wheezes, rales or rhonchi. +Tenderness to left anterior chest wall.   ABD: Normal bowel sounds; soft; non-distended; non-tender.   EXT/MSK: Normal ROM. No edema. No midline tenderness. +Tenderness to left paraspinal of lumbar region.  LYMPH: No acute cervical adenopathy.  NEURO: Alert, oriented. Grossly unremarkable. No focal deficits.  PSYCH: Cooperative, appropriate.

## 2024-02-16 ENCOUNTER — APPOINTMENT (OUTPATIENT)
Dept: ELECTROPHYSIOLOGY | Facility: CLINIC | Age: 79
End: 2024-02-16
Payer: MEDICARE

## 2024-02-16 VITALS
SYSTOLIC BLOOD PRESSURE: 130 MMHG | TEMPERATURE: 98 F | DIASTOLIC BLOOD PRESSURE: 80 MMHG | HEART RATE: 63 BPM | WEIGHT: 246 LBS | HEIGHT: 71 IN | BODY MASS INDEX: 34.44 KG/M2

## 2024-02-16 PROCEDURE — 93000 ELECTROCARDIOGRAM COMPLETE: CPT

## 2024-02-16 PROCEDURE — 99214 OFFICE O/P EST MOD 30 MIN: CPT

## 2024-02-16 RX ORDER — TRAMADOL HYDROCHLORIDE 50 MG/1
50 TABLET, COATED ORAL
Refills: 0 | Status: COMPLETED | COMMUNITY
End: 2024-02-16

## 2024-02-20 RX ORDER — ROSUVASTATIN CALCIUM 40 MG/1
40 TABLET, FILM COATED ORAL
Qty: 90 | Refills: 3 | Status: ACTIVE | COMMUNITY
Start: 2020-12-07 | End: 1900-01-01

## 2024-02-22 ENCOUNTER — APPOINTMENT (OUTPATIENT)
Dept: ORTHOPEDIC SURGERY | Facility: CLINIC | Age: 79
End: 2024-02-22
Payer: MEDICARE

## 2024-02-22 PROCEDURE — 99213 OFFICE O/P EST LOW 20 MIN: CPT | Mod: 25

## 2024-02-22 PROCEDURE — 20610 DRAIN/INJ JOINT/BURSA W/O US: CPT | Mod: RT

## 2024-02-22 NOTE — PROCEDURE
[Large Joint Injection] : Large joint injection [Right] : of the right [Knee] : knee [Alcohol] : alcohol [Ethyl Chloride sprayed topically] : ethyl chloride sprayed topically [Sterile technique used] : sterile technique used [Synvisc (16mg)] : 16mg of Synvisc [#1] : series #1 [] : Patient tolerated procedure well [Call if redness, pain or fever occur] : call if redness, pain or fever occur [Apply ice for 15min out of every hour for the next 12-24 hours as tolerated] : apply ice for 15 minutes out of every hour for the next 12-24 hours as tolerated [Risks, benefits, alternatives discussed / Verbal consent obtained] : the risks benefits, and alternatives have been discussed, and verbal consent was obtained

## 2024-02-22 NOTE — DISCUSSION/SUMMARY
[de-identified] : Right knee pain follow-up  HPI Patient is a 78-year-old male who reports to the office for subsequent reevaluation of his right knee pain.  He is here to receive his first Synvisc injection to the right knee.  He had a right knee arthroscopy/meniscectomy done on 11/28/2023 by Dr. Baker.  Right knee exam is as follows: Minimal effusion noted. No erythema or ecchymosis.  Able to perform active straight leg raise. Knee flexion from 0 to 120 degrees. Patellofemoral tenderness to palpation. Calf is soft and nontender. Negative Miriam's. Light touch intact throughout. nonantalgic gait.  Assessment/plan With the patient's approval, the right knee first Synvisc injection was performed in the office today.  See the attached procedure note for further details.  Explained to the patient that the full effect of the medication may take up to 6 weeks for it to kick in.  The patient was advised to rest/ice the area and can alternate with warm compresses.  Instructed not to do any strenuous activity that would further aggravate their symptoms.  Patient will follow-up in 1 week for his second injection. All of the patient's questions/concerns were answered in detail.

## 2024-02-29 ENCOUNTER — APPOINTMENT (OUTPATIENT)
Dept: ORTHOPEDIC SURGERY | Facility: CLINIC | Age: 79
End: 2024-02-29
Payer: MEDICARE

## 2024-02-29 PROCEDURE — 20610 DRAIN/INJ JOINT/BURSA W/O US: CPT | Mod: RT

## 2024-02-29 NOTE — PROCEDURE
[Large Joint Injection] : Large joint injection [Right] : of the right [Knee] : knee [Alcohol] : alcohol [Ethyl Chloride sprayed topically] : ethyl chloride sprayed topically [Sterile technique used] : sterile technique used [Synvisc (16mg)] : 16mg of Synvisc [#2] : series #2 [] : Patient tolerated procedure well [Call if redness, pain or fever occur] : call if redness, pain or fever occur [Apply ice for 15min out of every hour for the next 12-24 hours as tolerated] : apply ice for 15 minutes out of every hour for the next 12-24 hours as tolerated [Risks, benefits, alternatives discussed / Verbal consent obtained] : the risks benefits, and alternatives have been discussed, and verbal consent was obtained

## 2024-02-29 NOTE — DISCUSSION/SUMMARY
[de-identified] : Right knee pain follow-up  HPI Patient is a 78-year-old male who reports to the office for subsequent reevaluation of his right knee pain. He is here to receive his second Synvisc injection to the right knee. He had a right knee arthroscopy/meniscectomy done on 11/28/2023 by Dr. Baker.  Assessment/plan With the patient's approval, the right second first Synvisc injection was performed in the office today. See the attached procedure note for further details. Explained to the patient that the full effect of the medication may take up to 6 weeks for it to kick in.  The patient was advised to rest/ice the area and can alternate with warm compresses. Instructed not to do any strenuous activity that would further aggravate their symptoms.  Patient will follow-up in 1 week for his third and final injection. All of the patient's questions/concerns were answered in detail.

## 2024-03-04 ENCOUNTER — APPOINTMENT (OUTPATIENT)
Dept: CARDIOLOGY | Facility: CLINIC | Age: 79
End: 2024-03-04
Payer: MEDICARE

## 2024-03-04 VITALS
WEIGHT: 244 LBS | SYSTOLIC BLOOD PRESSURE: 132 MMHG | BODY MASS INDEX: 34.16 KG/M2 | HEIGHT: 71 IN | DIASTOLIC BLOOD PRESSURE: 82 MMHG | HEART RATE: 65 BPM

## 2024-03-04 DIAGNOSIS — I50.9 HEART FAILURE, UNSPECIFIED: ICD-10-CM

## 2024-03-04 PROCEDURE — 93000 ELECTROCARDIOGRAM COMPLETE: CPT

## 2024-03-04 PROCEDURE — 99214 OFFICE O/P EST MOD 30 MIN: CPT

## 2024-03-04 NOTE — ASSESSMENT
[FreeTextEntry1] : Ms. Mcnally is a 78-year-old man with history of CAD (PCI RCA 2019, LAD 2001; LCx 100%), paroxysmal AF on DOAC, HTN, HLD, hypothyroidism, and ETELVINA presenting for follow up.  Impression: (1) CAD s/p PCI as above, stable (2) Paroxysmal AF, CHADSVASc at least 4 (Age +2, HTN, CAD), on Rivaroxaban and dronedarone (3) HTN, well controlled today (4) HLD, goal LDL <70, last 48 (5) ETELVINA  Addended Plan: - Patient is planned for a spinal procedure (injection vs ?ablation). He is able to achieve >4 METS without cardiopulmonary limitations. He has a known moderate lesion in the mid LAD, which is asymptomatic at this point and is being medically managed. He may proceed with low risk surgery at a low-to-intermediate risk for MACE. He may stop his Xarelto 48-72 hours prior to intervention. Given known CAD would substitute Xarelto with aspirin in the perioperative period and switch back to Xarelto when deemed safe by the surgical team, ideally the night of surgery. No further pre-operative cardiac testing or intervention is warranted at this period. - Reassess after surgery. - Continue Xarelto, statin, dronedarone periprocedurally - Repeat labs, consider d/c standing furosemide   RTC 1 month

## 2024-03-04 NOTE — HISTORY OF PRESENT ILLNESS
[FreeTextEntry1] : Ms. Mcnally is a 78-year-old man with history of CAD (PCI RCA 2019, PCI LAD 2001; LCx 100%), paroxysmal AF on DOAC, HTN, HLD, hypothyroidism, and ETELVINA presenting for follow up.  Patient previously followed with Dr. Rizzo and was last seen by him in office 9/2022.  8/2023 was admitted for evaluation of chest discomfort and dyspnea on exertion along with bilateral lower extremity edema in the context of starting HRT 4 weeks prior. He was noted to be in rapid AF in the setting of acute on chronic diastolic heart failure. He underwent DCCV and was started on dronedarone 400mg BID.  Today feels much improved. No further symptomatic episodes of AF.  TTE 9/2022 - Normal systolic function, posterobasal hypokinesis, severe LA dilatation, mild MR, mild AI, mild Ao sclerosis, in NSR TTE 8/2023 - EF 55-60%, G1DD, moderate LA dilatation, fibrocalcific Ao  [facility] 12/2019 - Significant double vessel CAD, RCA and LCx, s/p PCI of pRCA. - 60% mLAD iFR 0.94 - prox LCx 100% (?)  Carotid Duplex 2023 - Bilateral <50% ICA stenoses - L ECA significant stenosis  NM Stress 2019 - Moderate-to-large in size, severe in severity inferolateral defect with minimal reversibility  Labs - Hgb 12.7, Plt 127 - Cr 1.16, K 4.7 - , , HDL 37, LDL 48 - A1c 6.9% (max 7.8%), TSH 1.07, pBNP 201 (<586)  Meds: - Xarelto 20mg daily - Amlodipine 2.5mg - Valsartan 160mg - Metoprolol 50mg BID - Rosuvastatin 40mg - Fenofibrate 145mg daily - Dronedarone 400mg BID - Furosemide 40mg daily - Synthroid 150mcg

## 2024-03-07 ENCOUNTER — APPOINTMENT (OUTPATIENT)
Dept: ORTHOPEDIC SURGERY | Facility: CLINIC | Age: 79
End: 2024-03-07
Payer: MEDICARE

## 2024-03-07 DIAGNOSIS — M17.10 UNILATERAL PRIMARY OSTEOARTHRITIS, UNSPECIFIED KNEE: ICD-10-CM

## 2024-03-07 PROCEDURE — 20610 DRAIN/INJ JOINT/BURSA W/O US: CPT | Mod: RT

## 2024-03-07 NOTE — DISCUSSION/SUMMARY
[de-identified] : Right knee pain follow-up  HPI Patient is a 78-year-old male who reports to the office for subsequent reevaluation of his right knee pain. He is here to receive his third and final Synvisc injection to the right knee. He had a right knee arthroscopy/meniscectomy done on 11/28/2023 by Dr. Baker.  Assessment/plan With the patient's approval, the right third and final Synvisc injection was performed in the office today. See the attached procedure note for further details. Explained to the patient that the full effect of the medication may take up to 6 weeks for it to kick in.  The patient was advised to rest/ice the area and can alternate with warm compresses. Instructed not to do any strenuous activity that would further aggravate their symptoms.  Patient will follow-up in 5 months for repeat evaluation. All of the patient's questions/concerns were answered in detail.  Procedure Large joint injection was performed of the right knee. The site was prepped with alcohol, ethyl chloride sprayed topically and sterile technique used. An injection of 16mg of Synvisc, series #3 was used.  Patient tolerated procedure well. Patient was advised to call if redness, pain or fever occur and apply ice for 15 minutes out of every hour for the next 12-24 hours as tolerated.    The risks benefits, and alternatives have been discussed, and verbal consent was obtained.

## 2024-03-09 NOTE — PHYSICAL EXAM
[Well Developed] : well developed [Well Nourished] : well nourished [No Acute Distress] : no acute distress [Normal Venous Pressure] : normal venous pressure [No Carotid Bruit] : no carotid bruit [Normal S1, S2] : normal S1, S2 [No Murmur] : no murmur [No Rub] : no rub [No Gallop] : no gallop [Clear Lung Fields] : clear lung fields [Good Air Entry] : good air entry [No Respiratory Distress] : no respiratory distress  [Soft] : abdomen soft [Non Tender] : non-tender [No Masses/organomegaly] : no masses/organomegaly [Normal Bowel Sounds] : normal bowel sounds [Normal Gait] : normal gait [No Edema] : no edema [No Cyanosis] : no cyanosis [No Clubbing] : no clubbing [No Varicosities] : no varicosities [No Rash] : no rash [No Skin Lesions] : no skin lesions [Moves all extremities] : moves all extremities [No Focal Deficits] : no focal deficits [Normal Speech] : normal speech [Alert and Oriented] : alert and oriented [Normal memory] : normal memory [General Appearance - Well Developed] : well developed [Normal Appearance] : normal appearance [Well Groomed] : well groomed [General Appearance - Well Nourished] : well nourished [No Deformities] : no deformities [General Appearance - In No Acute Distress] : no acute distress [Normal Conjunctiva] : the conjunctiva exhibited no abnormalities [Eyelids - No Xanthelasma] : the eyelids demonstrated no xanthelasmas [No Oral Pallor] : no oral pallor [No Oral Cyanosis] : no oral cyanosis [Respiration, Rhythm And Depth] : normal respiratory rhythm and effort [Heart Rate And Rhythm] : heart rate and rhythm were normal [Heart Sounds] : normal S1 and S2 [Arterial Pulses Normal] : the arterial pulses were normal [Abnormal Walk] : normal gait [Abdomen Soft] : soft [Nail Clubbing] : no clubbing of the fingernails [Cyanosis, Localized] : no localized cyanosis [Petechial Hemorrhages (___cm)] : no petechial hemorrhages [Skin Color & Pigmentation] : normal skin color and pigmentation [] : no rash [Oriented To Time, Place, And Person] : oriented to person, place, and time [Affect] : the affect was normal [Mood] : the mood was normal [No Anxiety] : not feeling anxious [FreeTextEntry1] : No JVD

## 2024-03-09 NOTE — ASSESSMENT
[FreeTextEntry1] : AF - Patient in normal sinus rhythm and doing well - Continue Xarelto 20mg QD - Continue metoprolol 50mg BID - Continue Multaq 400 mg twice a day.   - Patient currently in normal sinus rhythm. If he continues to have frequent episodes of AF, will offer patient ablation.     Hypertension - Patient's blood pressure normally well controlled however he did not take his medication this morning - Patient is following up with cardiology and will recheck his blood pressure then - Continue Valsartan 100mg QD - Continue amlodipine 2.5 QD

## 2024-03-09 NOTE — ADDENDUM
[FreeTextEntry1] : Fawn LAZO assisted in documentation on 02/16/2024   acting as a scribe for Dr. Jero Hall.

## 2024-03-09 NOTE — HISTORY OF PRESENT ILLNESS
[FreeTextEntry1] : Patient with history DVT, ETELVINA t HTN, DM, PCI, CAD, HLD seizures and kidney stone. PAF  CHADVASC 4.  Patient had h/o syncope ,Work up was negative h/o DCCV  guided PHILIP in 2020  Presents for routine follow up Denies any sob, STRINGER, PND, orthopnea, no palpitations, no dizziness, lightheadedness, denies chest pains  Patient doing well. No palpitations, shows no signs of dyspnea on exertion.   Patient comes for routine follow-up. Patient feels great. He has lifted his head off his bed and his wife found that there is less snoring and he is sleeping better.  Patient underwent successful cardioversion for atrial fibrillation. Patient is currently in normal sinus rhythm and is feeling much better.      Patient is doing great and comes for routine follow-up. No signs of recurrence.     EKG (02/16/2024): Sinus rhythm at 63 bpm.  EKG (09/15/2023): Sinus rhythm at 71 bpm.  EKG (06/30/2023): Sinus rhythm at 75 bpm. TTE 9/2022 - Normal systolic function, posterobasal hypokinesis, severe LA dilatation, mild MR, mild AI, mild Ao sclerosis, in NSR TTE 8/2023 - EF 55-60%, G1DD, moderate LA dilatation, fibrocalcific Ao  [facility] 12/2019 - Significant double vessel CAD, RCA and LCx, s/p PCI of pRCA. - 60% mLAD iFR 0.94 - prox LCx 100% (?)   ECHO ( 4/13/2021) EF 50% Cardiologist: Dr. Rizzo

## 2024-03-21 ENCOUNTER — APPOINTMENT (OUTPATIENT)
Dept: UROLOGY | Facility: CLINIC | Age: 79
End: 2024-03-21
Payer: MEDICARE

## 2024-03-21 VITALS
BODY MASS INDEX: 34.16 KG/M2 | WEIGHT: 244 LBS | HEART RATE: 74 BPM | SYSTOLIC BLOOD PRESSURE: 149 MMHG | HEIGHT: 71 IN | DIASTOLIC BLOOD PRESSURE: 68 MMHG | OXYGEN SATURATION: 94 %

## 2024-03-21 DIAGNOSIS — N28.9 DISORDER OF KIDNEY AND URETER, UNSPECIFIED: ICD-10-CM

## 2024-03-21 PROCEDURE — 99205 OFFICE O/P NEW HI 60 MIN: CPT

## 2024-03-24 NOTE — HISTORY OF PRESENT ILLNESS
[FreeTextEntry1] : 78-year-old male referred by Dr. Saxena for consultation regarding right midpole renal lesion. He reports feeling well.  States he has PSA and prostate checks with Dr. Saxena. Denies difficulties urinating.  Reviewed images from [id number]. Patient has a 1.5 to 1.8 cm right midpole indeterminate renal lesion. On MRI in December 2023,, on addendum there is mention of enhancement suspicious for clear-cell RCC.  Images from 2021 2024 reviewed with patient and his wife. the area of interest and the entire kidney was outlined and details clearly described

## 2024-03-24 NOTE — ADDENDUM
[FreeTextEntry1] : Documented by ESTELA Morales acting as a scribe for Dr. Ame Ames   All medical record entries made by the Scribe were at my, Dr. Ames direction and personally dictated by me.  I have reviewed the chart and agree that the record accurately reflects my personal performance of the history, physical exam, procedure and imaging.

## 2024-03-24 NOTE — LETTER BODY
[Dear  ___] : Dear  [unfilled], [Please see my note below.] : Please see my note below. [Consult Letter:] : I had the pleasure of evaluating your patient, [unfilled]. [Sincerely,] : Sincerely, [FreeTextEntry3] : Ame Ames MD, FACS

## 2024-03-24 NOTE — ASSESSMENT
[FreeTextEntry1] : 78-year-old presents for consultation regarding right midpole indeterminate renal lesion. On MRI, degraded by motion artifact, there is mention of enhancement suspicious for clear-cell RCC.  Images from 2021 2024 reviewed patient and the spouse. Reviewed significance of size of lesion. Discussed options with patient and his spouse.   We reviewed the possible underlying histology of solid enhancing renal masses, with the majority being malignant (~80%) whereas ~20% are benign (e.g. oncocytoma). We discussed the role/possibility of percutaneous biopsy, with the associated risks, benefits, complications, and accuracy issues (e.g. risk of false negative results). The heterogeneous natural history/biology of renal cell carcinoma was discussed, including the fact that while many renal cancers are indolent, others behave aggressively.   The options were reviewed including active surveillance (AS), surgical extirpation and ablation. The risks of tumor growth and metastasis on active surveillance were reviewed, including the fact that metastatic progression on AS could mean missing the opportunity for curative treatment. The average growth rate of ~2-3 mm/year and metastasis rates of ~2-3% on AS over 5 year interval for small renal masses <4 cm was discussed.    Plan -Renal sonogram in August 2024.  6 months since his last CT scan. -Follow-up to review

## 2024-05-02 ENCOUNTER — APPOINTMENT (OUTPATIENT)
Dept: CARDIOLOGY | Facility: CLINIC | Age: 79
End: 2024-05-02
Payer: MEDICARE

## 2024-05-02 VITALS
HEIGHT: 71 IN | HEART RATE: 66 BPM | DIASTOLIC BLOOD PRESSURE: 80 MMHG | BODY MASS INDEX: 35 KG/M2 | SYSTOLIC BLOOD PRESSURE: 126 MMHG | WEIGHT: 250 LBS

## 2024-05-02 DIAGNOSIS — I10 ESSENTIAL (PRIMARY) HYPERTENSION: ICD-10-CM

## 2024-05-02 DIAGNOSIS — I48.91 UNSPECIFIED ATRIAL FIBRILLATION: ICD-10-CM

## 2024-05-02 DIAGNOSIS — E78.00 PURE HYPERCHOLESTEROLEMIA, UNSPECIFIED: ICD-10-CM

## 2024-05-02 DIAGNOSIS — I25.10 ATHEROSCLEROTIC HEART DISEASE OF NATIVE CORONARY ARTERY W/OUT ANGINA PECTORIS: ICD-10-CM

## 2024-05-02 PROCEDURE — 93000 ELECTROCARDIOGRAM COMPLETE: CPT

## 2024-05-02 PROCEDURE — 99214 OFFICE O/P EST MOD 30 MIN: CPT

## 2024-05-03 NOTE — ASSESSMENT
[FreeTextEntry1] : Ms. Mcnally is a 78-year-old man with history of CAD (PCI RCA 2019, LAD 2001; LCx 100%), paroxysmal AF on DOAC, HTN, HLD, hypothyroidism, and ETELVINA presenting for follow up.  Impression: (1) CAD s/p PCI as above, stable (2) Paroxysmal AF, CHADSVASc at least 4 (Age +2, HTN, CAD), on Rivaroxaban and dronedarone (3) HTN, well controlled today (4) HLD, goal LDL <70, last 48 (5) ETELVINA (6) DM2  Plan: - EP follow up as scheduled next month - Recommend switching metformin to a GLP1a vs SGLT2i in light of concurrent DM2 with CAD. Patient is amenable but not ready to commit to a medication switch. He will discuss this with his PCP. - Continue Xarelto, statin, dronedarone  - Repeat labs, consider d/c standing furosemide   RTC 6 months, sooner as needed

## 2024-05-03 NOTE — HISTORY OF PRESENT ILLNESS
[FreeTextEntry1] : Ms. Mcnally is a 78-year-old man with history of CAD (PCI RCA 2019, PCI LAD 2001; LCx 100%), paroxysmal AF on DOAC, HTN, HLD, hypothyroidism, and ETELVINA presenting for follow up.  Patient previously followed with Dr. Rizzo and was last seen by him in office 9/2022.  8/2023 was admitted for evaluation of chest discomfort and dyspnea on exertion along with bilateral lower extremity edema in the context of starting HRT 4 weeks prior. He was noted to be in rapid AF in the setting of acute on chronic diastolic heart failure. He underwent DCCV and was started on dronedarone 400mg BID.  Today feels much improved. No further symptomatic episodes of AF.  TTE 9/2022 - Normal systolic function, posterobasal hypokinesis, severe LA dilatation, mild MR, mild AI, mild Ao sclerosis, in NSR TTE 8/2023 - EF 55-60%, G1DD, moderate LA dilatation, fibrocalcific Ao  [facility] 12/2019 - Significant double vessel CAD, RCA and LCx, s/p PCI of pRCA. - 60% mLAD iFR 0.94 - prox LCx 100% (?)  Carotid Duplex 2023 - Bilateral <50% ICA stenoses - L ECA significant stenosis  NM Stress 2019 - Moderate-to-large in size, severe in severity inferolateral defect with minimal reversibility  Labs - Hgb 12, Plt 125 - Cr 1.03, K 4.4, normal transaminases - , , HDL 43, LDL 77 - A1c 6.5% (max 7.8%), TSH 0.8, hsCRP 4, pBNP 334 (<586)  Meds: - Xarelto 20mg daily - Amlodipine 2.5mg - Valsartan 160mg - Metoprolol 50mg BID - Rosuvastatin 40mg - Dronedarone 400mg BID - Furosemide 40mg daily - Synthroid 150mcg

## 2024-07-01 NOTE — H&P PST ADULT - PMH
"   Hospital Medicine  History & Physical        CHIEF COMPLAINT   \"It got worse and worse\"      HISTORY OF PRESENT ILLNESS      Dave Arredondo is a 88 y.o. 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 He was recently admitted for right sided pleural effusion and chest pain was found to have lung nodules on CT. He received a thoracentesis and was to follow up with outpatient pulmonology and oncology. He was evaluated by cardiology who determined EKG was nonischemic and pain was pleuritic in nature. He was unable to follow up outpatient with pulmonology or oncology due to providers not accepting Medicaid.    Today he presents with SOB, pleuritic chest pain, and MARY worsening since discharge 6/19. He is unable to lay flat or walk more than one hallway length without stopping. He notes that it has been harder for him to go up stairs and that he feels generally more weak. He is stable on RA in no apparent distress. His chest pain is pleuritic in nature; R sided with radiation to arm that improves with positioning. He affirms this pain has been constant x1 month PTA. CXR today with Large right pleural effusion. He is euvolemic on exam with no LE edema, reduced R sided air movement, reports chronic nonproductive cough, denies fever/chills.     PAST MEDICAL AND SURGICAL HISTORY      Past Medical History:   Diagnosis Date    Colon cancer (CMS/HCC)     Coronary artery disease     DVT (deep venous thrombosis) (CMS/[facility])     Hypertension     Lipid disorder        Past Surgical History:   Procedure Laterality Date    COLOSTOMY      HERNIA REPAIR         PCP: Pt States, No Pcp    MEDICATIONS      Prior to Admission medications    Medication Sig Start Date End Date Taking? Authorizing Provider   albuterol HFA 90 mcg/actuation inhaler INHALE 2 " PUFF USING INHALER EVERY EIGHT HOURS AS NEEDED 5/1/24   Juan Urrutia MD   atorvastatin (LIPITOR) 20 mg tablet Take 20 mg by mouth daily. 3/9/20   Nemesio Urrutia MD   cyanocobalamin (VITAMIN B-12) 1,000 mcg/mL injection Inject 1,000 mcg into the shoulder, thigh, or buttocks every 30 (thirty) days. 4/21/20   Nemesio Urrutia MD   furosemide (LASIX) 20 mg tablet Take 20 mg by mouth daily. 5/16/24   Juan Urrutia MD   metoprolol succinate XL (TOPROL-XL) 100 mg 24 hr tablet Take 100 mg by mouth daily. 10/21/19   Nemesio Urrutia MD   nitroglycerin (NITROSTAT) 0.4 mg SL tablet Place 0.4 mg under the tongue every 5 (five) minutes as needed.      Nemesio Urrutia MD   pantoprazole (PROTONIX) 40 mg EC tablet Take 1 tablet (40 mg total) by mouth nightly Indications: GI ppx w/Xarelto. 6/19/24 7/19/24  Haase, Patricia, CRNP   XARELTO 10 mg tablet TAKE 1 TABLET BY MOUTH EVERY DAY WITH DINNER    ProviderJuan MD       ALLERGIES      Patient has no known allergies.    FAMILY HISTORY      Family History   Problem Relation Age of Onset    Heart disease Biological Mother        SOCIAL HISTORY      Social History     Socioeconomic History    Marital status:      Spouse name: None    Number of children: None    Years of education: None    Highest education level: None   Tobacco Use    Smoking status: Never    Smokeless tobacco: Never   Substance and Sexual Activity    Alcohol use: Not Currently    Drug use: Never    Sexual activity: Defer   Social History Narrative    Lives in the [facility]      Social Determinants of Health     Food Insecurity: No Food Insecurity (7/1/2024)    Hunger Vital Sign     Worried About Running Out of Food in the Last Year: Never true     Ran Out of Food in the Last Year: Never true       REVIEW OF SYSTEMS      All other systems reviewed and negative except as noted in HPI    PHYSICAL EXAMINATION      Temp:  [36.9 °C (98.4  °F)] 36.9 °C (98.4 °F)  Heart Rate:  [] 84  Resp:  [20] 20  BP: (113-143)/(66-84) 113/66  Body mass index is 25.06 kg/m².    Physical Exam  Vitals reviewed.   HENT:      Head: Normocephalic and atraumatic.      Right Ear: External ear normal.      Left Ear: External ear normal.      Nose: Nose normal.   Eyes:      Extraocular Movements: Extraocular movements intact.      Conjunctiva/sclera: Conjunctivae normal.      Pupils: Pupils are equal, round, and reactive to light.   Cardiovascular:      Rate and Rhythm: Normal rate and regular rhythm.   Pulmonary:      Effort: Pulmonary effort is normal.      Comments: Reduced lung rounds/air movement R side   Normal rate  Abdominal:      General: Abdomen is flat.      Palpations: Abdomen is soft.      Comments: Colostomy intact, brown stool    Musculoskeletal:      Right lower leg: No edema.      Left lower leg: No edema.   Skin:     Capillary Refill: Capillary refill takes less than 2 seconds.      Coloration: Skin is pale.   Neurological:      Mental Status: He is oriented to person, place, and time.   Psychiatric:         Mood and Affect: Mood normal.         LABS / IMAGING / EKG        Labs  I have reviewed the patient's pertinent labs. Pertinent labs are within normal limits.    Imaging  Significant findings include: CXR with large R pleural effusion       ECG/Telemetry  I have independently reviewed the telemetry. Significant findings include NSR 1deg AVB.    ASSESSMENT AND PLAN           * Pleural effusion on right  Assessment & Plan  S/p 6/17 R sided thoracentesis   XR today with large pleural effusion   Stable on RA     Last dose Xarelto 6/30. Hold further doses Xarelto, place SCDs  c/s heme, c/s pulm   C/s CM for insurance issues with outpt follow up   C/s PT for continued MARY and difficulty with stairs   daily weights Ios  PRN bronchodilators        CAD (coronary artery disease)  Assessment & Plan  presumed CAD - declined cath, based on abnormal stress in  2018  EKG NSR 1AVB    troponin 14-> 12  06/2024 TTE:  Normal systolic function. Estimated EF 65%.Grade I diastolic dysfunction. Mild tricuspid regurgitation.  Mild MVR .  Euvolemic on exam, on RA   No concern for acute exacerbation   Continue lopressor, statin   Monitor fluid status closely, will remain on tele   Chest pain pleuritic in nature, has had for >1 month, has been previously evaluated by [facility] cardiology and can follow up outpt with Vigilante MD  Consider cardiology c/s if worsening CP, hemodynamic instability, concern for fluid retention     Pulmonary nodules  Assessment & Plan  6/18 CAT scan chest revealed pulmonary nodules  Now returns to ED with recurrent SOB, pleural effusion   C/s heme onc       Right-sided chest pain  Assessment & Plan  Pleuritic in nature; changes with movement, is relieved by laying on L side   No ischemic EKG changes  Monitor on telemetry       History of rectal cancer  Assessment & Plan  S/p colostomy   Ostomy site CDI, brown stool visualized     DVT (deep venous thrombosis) (CMS/HCC)  Assessment & Plan  Holding Xarelto   No concern for DVT on exam          VTE Assessment: Padua VTE Score: 4  VTE Prophylaxis: Current anticoagulants:    None      Code Status: Full Code     Estimated Discharge Date: 7/3/2024  Disposition Planning: home with home care vs SNF     VALENCIA Panda  7/1/2024        CAD (coronary artery disease)  STENTS 2000 AND 2002  Cardiac abnormality    Diabetes    DVT (deep venous thrombosis)  LEFT LE- 2008 AND 2018 AFTER INJURY  GERD (gastroesophageal reflux disease)    Hypertension complications    ETELVINA (obstructive sleep apnea)  NOT USING cpap

## 2024-07-06 ENCOUNTER — OUTPATIENT (OUTPATIENT)
Dept: OUTPATIENT SERVICES | Facility: HOSPITAL | Age: 79
LOS: 1 days | End: 2024-07-06
Payer: MEDICARE

## 2024-07-06 DIAGNOSIS — Z98.890 OTHER SPECIFIED POSTPROCEDURAL STATES: Chronic | ICD-10-CM

## 2024-07-06 DIAGNOSIS — Z98.49 CATARACT EXTRACTION STATUS, UNSPECIFIED EYE: Chronic | ICD-10-CM

## 2024-07-06 DIAGNOSIS — N20.0 CALCULUS OF KIDNEY: Chronic | ICD-10-CM

## 2024-07-06 DIAGNOSIS — D41.01 NEOPLASM OF UNCERTAIN BEHAVIOR OF RIGHT KIDNEY: ICD-10-CM

## 2024-07-06 DIAGNOSIS — E11.8 TYPE 2 DIABETES MELLITUS WITH UNSPECIFIED COMPLICATIONS: ICD-10-CM

## 2024-07-06 DIAGNOSIS — I25.10 ATHEROSCLEROTIC HEART DISEASE OF NATIVE CORONARY ARTERY WITHOUT ANGINA PECTORIS: Chronic | ICD-10-CM

## 2024-07-06 PROCEDURE — 74177 CT ABD & PELVIS W/CONTRAST: CPT | Mod: 26,MH

## 2024-07-06 PROCEDURE — 74177 CT ABD & PELVIS W/CONTRAST: CPT

## 2024-07-07 DIAGNOSIS — D41.01 NEOPLASM OF UNCERTAIN BEHAVIOR OF RIGHT KIDNEY: ICD-10-CM

## 2024-07-07 DIAGNOSIS — E11.8 TYPE 2 DIABETES MELLITUS WITH UNSPECIFIED COMPLICATIONS: ICD-10-CM

## 2024-07-12 ENCOUNTER — APPOINTMENT (OUTPATIENT)
Dept: ELECTROPHYSIOLOGY | Facility: CLINIC | Age: 79
End: 2024-07-12
Payer: MEDICARE

## 2024-07-12 VITALS
SYSTOLIC BLOOD PRESSURE: 130 MMHG | HEIGHT: 71 IN | TEMPERATURE: 97.1 F | HEART RATE: 58 BPM | BODY MASS INDEX: 34.86 KG/M2 | WEIGHT: 249 LBS | DIASTOLIC BLOOD PRESSURE: 72 MMHG

## 2024-07-12 DIAGNOSIS — I48.0 PAROXYSMAL ATRIAL FIBRILLATION: ICD-10-CM

## 2024-07-12 DIAGNOSIS — I10 ESSENTIAL (PRIMARY) HYPERTENSION: ICD-10-CM

## 2024-07-12 PROCEDURE — 99214 OFFICE O/P EST MOD 30 MIN: CPT

## 2024-07-12 PROCEDURE — G2211 COMPLEX E/M VISIT ADD ON: CPT

## 2024-07-12 PROCEDURE — 93000 ELECTROCARDIOGRAM COMPLETE: CPT

## 2024-07-15 ENCOUNTER — APPOINTMENT (OUTPATIENT)
Dept: PULMONOLOGY | Facility: CLINIC | Age: 79
End: 2024-07-15

## 2024-07-19 ENCOUNTER — APPOINTMENT (OUTPATIENT)
Dept: ELECTROPHYSIOLOGY | Facility: CLINIC | Age: 79
End: 2024-07-19

## 2024-07-28 ENCOUNTER — OUTPATIENT (OUTPATIENT)
Dept: OUTPATIENT SERVICES | Facility: HOSPITAL | Age: 79
LOS: 1 days | End: 2024-07-28
Payer: MEDICARE

## 2024-07-28 DIAGNOSIS — N20.0 CALCULUS OF KIDNEY: Chronic | ICD-10-CM

## 2024-07-28 DIAGNOSIS — Z98.890 OTHER SPECIFIED POSTPROCEDURAL STATES: Chronic | ICD-10-CM

## 2024-07-28 DIAGNOSIS — Z98.49 CATARACT EXTRACTION STATUS, UNSPECIFIED EYE: Chronic | ICD-10-CM

## 2024-07-28 DIAGNOSIS — Z00.8 ENCOUNTER FOR OTHER GENERAL EXAMINATION: ICD-10-CM

## 2024-07-28 DIAGNOSIS — I25.10 ATHEROSCLEROTIC HEART DISEASE OF NATIVE CORONARY ARTERY WITHOUT ANGINA PECTORIS: Chronic | ICD-10-CM

## 2024-07-28 DIAGNOSIS — D37.6 NEOPLASM OF UNCERTAIN BEHAVIOR OF LIVER, GALLBLADDER AND BILE DUCTS: ICD-10-CM

## 2024-07-28 PROCEDURE — 76705 ECHO EXAM OF ABDOMEN: CPT

## 2024-07-28 PROCEDURE — 76705 ECHO EXAM OF ABDOMEN: CPT | Mod: 26

## 2024-07-29 DIAGNOSIS — D37.6 NEOPLASM OF UNCERTAIN BEHAVIOR OF LIVER, GALLBLADDER AND BILE DUCTS: ICD-10-CM

## 2024-08-08 ENCOUNTER — APPOINTMENT (OUTPATIENT)
Dept: ORTHOPEDIC SURGERY | Facility: CLINIC | Age: 79
End: 2024-08-08

## 2024-08-12 ENCOUNTER — APPOINTMENT (OUTPATIENT)
Dept: UROLOGY | Facility: CLINIC | Age: 79
End: 2024-08-12
Payer: MEDICARE

## 2024-08-12 VITALS
HEIGHT: 71 IN | HEART RATE: 61 BPM | DIASTOLIC BLOOD PRESSURE: 78 MMHG | TEMPERATURE: 98.3 F | SYSTOLIC BLOOD PRESSURE: 153 MMHG | OXYGEN SATURATION: 94 % | RESPIRATION RATE: 16 BRPM | WEIGHT: 249 LBS | BODY MASS INDEX: 34.86 KG/M2

## 2024-08-12 PROCEDURE — 99214 OFFICE O/P EST MOD 30 MIN: CPT

## 2024-08-15 NOTE — HISTORY OF PRESENT ILLNESS
[FreeTextEntry1] : 78-year-old male initally referred by Dr. Saxena for consultation regarding right midpole renal lesion. He reports feeling well.  States he has PSA and prostate checks with Dr. Saxena. Denies difficulties urinating.  CT scan 7/2024 - [place] stable 1.5 cm right renal mass - may represent cortical neoplasm 2.1 cm hepatic hypodense lesion mild splenomegaly   Reviewed images from [id number]. Patient has a 1.5 to 1.8 cm right midpole indeterminate renal lesion. On MRI in December 2023,, on addendum there is mention of enhancement suspicious for clear-cell RCC.  Images from 2021 2024 reviewed with patient and his wife. the area of interest and the entire kidney was outlined and details clearly described

## 2024-08-15 NOTE — LETTER BODY
[Dear  ___] : Dear  [unfilled], [Consult Letter:] : I had the pleasure of evaluating your patient, [unfilled]. [Please see my note below.] : Please see my note below. [Sincerely,] : Sincerely, [FreeTextEntry3] : Ame Ames MD, FACS

## 2024-08-15 NOTE — ASSESSMENT
[FreeTextEntry1] : 78-year-old male initally referred by Dr. Saxena for consultation regarding right midpole renal lesion. He reports feeling well.  States he has PSA and prostate checks with Dr. Saxena. Denies difficulties urinating.  CT scan 7/2024 - [place] stable 1.5 cm right renal mass - may represent cortical neoplasm 2.1 cm hepatic hypodense lesion mild splenomegaly   Reviewed images from [id number]. Patient has a 1.5 to 1.8 cm right midpole indeterminate renal lesion. On MRI in December 2023,, on addendum there is mention of enhancement suspicious for clear-cell RCC.  Images from 2021 2024 reviewed with patient and his wife. the area of interest and the entire kidney was outlined and details clearly described     We reviewed the possible underlying histology of solid enhancing renal masses, with the majority being malignant (~80%) whereas ~20% are benign (e.g. oncocytoma). We discussed the role/possibility of percutaneous biopsy, with the associated risks, benefits, complications, and accuracy issues (e.g. risk of false negative results). The heterogeneous natural history/biology of renal cell carcinoma was discussed, including the fact that while many renal cancers are indolent, others behave aggressively.   The options were reviewed including active surveillance (AS), surgical extirpation and ablation. The risks of tumor growth and metastasis on active surveillance were reviewed, including the fact that metastatic progression on AS could mean missing the opportunity for curative treatment. The average growth rate of ~2-3 mm/year and metastasis rates of ~2-3% on AS over 5 year interval for small renal masses <4 cm was discussed.    Plan - continue renal surveillance - will f/u with Dr. Saxena - f/u with me as needed

## 2024-09-23 ENCOUNTER — RX RENEWAL (OUTPATIENT)
Age: 79
End: 2024-09-23

## 2024-10-03 ENCOUNTER — APPOINTMENT (OUTPATIENT)
Dept: CARDIOLOGY | Facility: CLINIC | Age: 79
End: 2024-10-03
Payer: MEDICARE

## 2024-10-03 VITALS
WEIGHT: 252 LBS | SYSTOLIC BLOOD PRESSURE: 150 MMHG | HEART RATE: 70 BPM | DIASTOLIC BLOOD PRESSURE: 80 MMHG | BODY MASS INDEX: 35.28 KG/M2 | HEIGHT: 71 IN

## 2024-10-03 DIAGNOSIS — I48.91 UNSPECIFIED ATRIAL FIBRILLATION: ICD-10-CM

## 2024-10-03 DIAGNOSIS — I25.10 ATHEROSCLEROTIC HEART DISEASE OF NATIVE CORONARY ARTERY W/OUT ANGINA PECTORIS: ICD-10-CM

## 2024-10-03 DIAGNOSIS — Z00.00 ENCOUNTER FOR GENERAL ADULT MEDICAL EXAMINATION W/OUT ABNORMAL FINDINGS: ICD-10-CM

## 2024-10-03 DIAGNOSIS — I10 ESSENTIAL (PRIMARY) HYPERTENSION: ICD-10-CM

## 2024-10-03 DIAGNOSIS — E78.00 PURE HYPERCHOLESTEROLEMIA, UNSPECIFIED: ICD-10-CM

## 2024-10-03 DIAGNOSIS — E11.9 TYPE 2 DIABETES MELLITUS W/OUT COMPLICATIONS: ICD-10-CM

## 2024-10-03 DIAGNOSIS — E66.9 OBESITY, UNSPECIFIED: ICD-10-CM

## 2024-10-03 PROCEDURE — G2211 COMPLEX E/M VISIT ADD ON: CPT

## 2024-10-03 PROCEDURE — 93000 ELECTROCARDIOGRAM COMPLETE: CPT

## 2024-10-03 PROCEDURE — 99214 OFFICE O/P EST MOD 30 MIN: CPT

## 2024-10-03 RX ORDER — METOPROLOL SUCCINATE 50 MG/1
50 TABLET, EXTENDED RELEASE ORAL
Qty: 90 | Refills: 3 | Status: ACTIVE | COMMUNITY
Start: 2024-10-03 | End: 1900-01-01

## 2024-10-03 RX ORDER — VALSARTAN AND HYDROCHLOROTHIAZIDE 320; 12.5 MG/1; MG/1
320-12.5 TABLET, FILM COATED ORAL
Qty: 90 | Refills: 3 | Status: ACTIVE | COMMUNITY
Start: 2024-10-03 | End: 1900-01-01

## 2024-10-03 NOTE — HISTORY OF PRESENT ILLNESS
[FreeTextEntry1] : Ms. Mcnally is a 79-year-old man with history of CAD (PCI RCA 2019, PCI LAD 2001; LCx 100%), paroxysmal AF on DOAC, HTN, HLD, hypothyroidism, and ETELVINA presenting for follow up.  Patient previously followed with Dr. Rizzo and was last seen by him in office 9/2022.  8/2023 was admitted for evaluation of chest discomfort and dyspnea on exertion along with bilateral lower extremity edema in the context of starting HRT 4 weeks prior. He was noted to be in rapid AF in the setting of acute on chronic diastolic heart failure. He underwent DCCV and was started on dronedarone 400mg BID.  Today feels much improved. No further symptomatic episodes of AF.  TTE 9/2022 - Normal systolic function, posterobasal hypokinesis, severe LA dilatation, mild MR, mild AI, mild Ao sclerosis, in NSR TTE 8/2023 - EF 55-60%, G1DD, moderate LA dilatation, fibrocalcific Ao  LHC 12/2019 - Significant double vessel CAD, RCA and LCx, s/p PCI of pRCA. - 60% mLAD iFR 0.94 - prox LCx 100% (?)  Carotid Duplex 2023 - Bilateral <50% ICA stenoses - L ECA significant stenosis  NM Stress 2019 - Moderate-to-large in size, severe in severity inferolateral defect with minimal reversibility  Labs - Hgb 11, Plt 114 - Cr 1.03, K 4.5, normal transaminases - , , HDL 43, LDL 77 -> LDL 65, TG 97 - A1c 6.2% (max 7.8%), TSH 1.59, hsCRP 4 -> 2.12, pBNP 311 (<586)  Meds: - Xarelto 20mg daily - Amlodipine 2.5mg - Valsartan 160mg - Metoprolol 50mg BID - Rosuvastatin 40mg - Dronedarone 400mg BID - Furosemide 40mg daily - Synthroid 150mcg

## 2024-10-03 NOTE — ASSESSMENT
[FreeTextEntry1] : Ms. Mcnally is a 79-year-old man with history of CAD (PCI RCA 2019, LAD 2001; LCx 100%), paroxysmal AF on DOAC, HTN, HLD, hypothyroidism, and ETELVINA presenting for follow up.  Impression: (1) CAD s/p PCI as above, stable (2) Paroxysmal AF, CHADSVASc at least 4 (Age +2, HTN, CAD), on Rivaroxaban and dronedarone (3) HTN, well controlled today (4) HLD, goal LDL <70, last 65 (5) ETELVINA (6) DM2  Plan: - Switch valsartan 160mg to valsartan-HCTZ 160-12.5mg daily - Switch metoprolol tartrate to succinate to minimize pill burden - Recommend switching metformin to a GLP1a vs SGLT2i in light of concurrent DM2 with CAD. Patient is amenable but not ready to commit to a medication switch. He will discuss this with his PCP. - Continue Xarelto, statin, dronedarone  - EP follow up for routine care. Consideration for LAAC if his Hgb continues to drop.   RTC 6 months, sooner as needed

## 2024-11-01 ENCOUNTER — APPOINTMENT (OUTPATIENT)
Dept: CARDIOLOGY | Facility: CLINIC | Age: 79
End: 2024-11-01

## 2024-11-26 ENCOUNTER — RX RENEWAL (OUTPATIENT)
Age: 79
End: 2024-11-26

## 2024-12-13 NOTE — ED ADULT NURSE NOTE - NS ED NURSE IV DC DT
"YOB: 2024  Kurt Juarez  [id number]  
14-Sep-2019 14:52

## 2024-12-16 ENCOUNTER — RX RENEWAL (OUTPATIENT)
Age: 79
End: 2024-12-16

## 2025-01-08 NOTE — ED ADULT NURSE NOTE - NSFALLCONCLUSION_ED_ALL_ED
LCA Cine(s)  injected and visualized utilizing power injector system. Universal Safety Interventions

## 2025-01-27 ENCOUNTER — RX RENEWAL (OUTPATIENT)
Age: 80
End: 2025-01-27

## 2025-03-12 ENCOUNTER — APPOINTMENT (OUTPATIENT)
Dept: PULMONOLOGY | Facility: CLINIC | Age: 80
End: 2025-03-12
Payer: MEDICARE

## 2025-03-12 VITALS
BODY MASS INDEX: 35 KG/M2 | DIASTOLIC BLOOD PRESSURE: 74 MMHG | OXYGEN SATURATION: 97 % | WEIGHT: 250 LBS | HEART RATE: 90 BPM | SYSTOLIC BLOOD PRESSURE: 118 MMHG | HEIGHT: 71 IN

## 2025-03-12 DIAGNOSIS — E66.9 OBESITY, UNSPECIFIED: ICD-10-CM

## 2025-03-12 DIAGNOSIS — R63.4 ABNORMAL WEIGHT LOSS: ICD-10-CM

## 2025-03-12 DIAGNOSIS — G47.33 OBSTRUCTIVE SLEEP APNEA (ADULT) (PEDIATRIC): ICD-10-CM

## 2025-03-12 PROCEDURE — 99214 OFFICE O/P EST MOD 30 MIN: CPT

## 2025-03-12 PROCEDURE — G2211 COMPLEX E/M VISIT ADD ON: CPT

## 2025-03-19 ENCOUNTER — OUTPATIENT (OUTPATIENT)
Dept: OUTPATIENT SERVICES | Facility: HOSPITAL | Age: 80
LOS: 1 days | End: 2025-03-19
Payer: MEDICARE

## 2025-03-19 DIAGNOSIS — Z98.49 CATARACT EXTRACTION STATUS, UNSPECIFIED EYE: Chronic | ICD-10-CM

## 2025-03-19 DIAGNOSIS — Z98.890 OTHER SPECIFIED POSTPROCEDURAL STATES: Chronic | ICD-10-CM

## 2025-03-19 DIAGNOSIS — Z00.8 ENCOUNTER FOR OTHER GENERAL EXAMINATION: ICD-10-CM

## 2025-03-19 DIAGNOSIS — I25.10 ATHEROSCLEROTIC HEART DISEASE OF NATIVE CORONARY ARTERY WITHOUT ANGINA PECTORIS: Chronic | ICD-10-CM

## 2025-03-19 DIAGNOSIS — N20.0 CALCULUS OF KIDNEY: Chronic | ICD-10-CM

## 2025-03-19 PROCEDURE — 76775 US EXAM ABDO BACK WALL LIM: CPT | Mod: 26

## 2025-03-19 PROCEDURE — 76775 US EXAM ABDO BACK WALL LIM: CPT

## 2025-03-20 DIAGNOSIS — R39.15 URGENCY OF URINATION: ICD-10-CM

## 2025-03-20 NOTE — ED ADULT NURSE NOTE - NSFALLRSKINDICATORS_ED_ALL_ED
[General Appearance - In No Acute Distress] : no acute distress [] : no respiratory distress [Penis Abnormality] : normal circumcised penis [Scrotum] : the scrotum was normal [Epididymis] : the epididymides were normal [Testes Tenderness] : no tenderness of the testes [Testes Mass (___cm)] : there were no testicular masses [Normal Station and Gait] : the gait and station were normal for the patient's age [No Focal Deficits] : no focal deficits [Oriented To Time, Place, And Person] : oriented to person, place, and time [de-identified] : Mild discomfort when palpating left epididymis.  No induration. [Chaperone Present] : A chaperone was present in the examining room during all aspects of the physical examination [FreeTextEntry2] : Sandro Bernard RN no

## 2025-03-28 ENCOUNTER — NON-APPOINTMENT (OUTPATIENT)
Age: 80
End: 2025-03-28

## 2025-03-28 ENCOUNTER — APPOINTMENT (OUTPATIENT)
Dept: ELECTROPHYSIOLOGY | Facility: CLINIC | Age: 80
End: 2025-03-28

## 2025-03-28 VITALS
HEART RATE: 84 BPM | DIASTOLIC BLOOD PRESSURE: 72 MMHG | WEIGHT: 250 LBS | TEMPERATURE: 97.1 F | BODY MASS INDEX: 35 KG/M2 | SYSTOLIC BLOOD PRESSURE: 142 MMHG | HEIGHT: 71 IN

## 2025-03-28 DIAGNOSIS — I10 ESSENTIAL (PRIMARY) HYPERTENSION: ICD-10-CM

## 2025-03-28 DIAGNOSIS — I48.0 PAROXYSMAL ATRIAL FIBRILLATION: ICD-10-CM

## 2025-03-28 PROCEDURE — 93000 ELECTROCARDIOGRAM COMPLETE: CPT

## 2025-03-28 PROCEDURE — 99214 OFFICE O/P EST MOD 30 MIN: CPT

## 2025-03-28 PROCEDURE — G2211 COMPLEX E/M VISIT ADD ON: CPT

## 2025-03-28 RX ORDER — TRAMADOL HYDROCHLORIDE 50 MG/1
50 TABLET, COATED ORAL
Refills: 0 | Status: ACTIVE | COMMUNITY

## 2025-03-28 RX ORDER — FENOFIBRATE 145 MG/1
145 TABLET, COATED ORAL DAILY
Refills: 0 | Status: ACTIVE | COMMUNITY

## 2025-03-28 RX ORDER — FINASTERIDE 5 MG/1
5 TABLET, FILM COATED ORAL DAILY
Refills: 0 | Status: ACTIVE | COMMUNITY

## 2025-03-30 ENCOUNTER — EMERGENCY (EMERGENCY)
Facility: HOSPITAL | Age: 80
LOS: 0 days | Discharge: HOME | End: 2025-03-31
Attending: EMERGENCY MEDICINE
Payer: MEDICARE

## 2025-03-30 VITALS
DIASTOLIC BLOOD PRESSURE: 69 MMHG | SYSTOLIC BLOOD PRESSURE: 121 MMHG | WEIGHT: 250 LBS | RESPIRATION RATE: 18 BRPM | HEART RATE: 85 BPM | HEIGHT: 71 IN | TEMPERATURE: 98 F | OXYGEN SATURATION: 98 %

## 2025-03-30 DIAGNOSIS — N20.0 CALCULUS OF KIDNEY: Chronic | ICD-10-CM

## 2025-03-30 DIAGNOSIS — R10.30 LOWER ABDOMINAL PAIN, UNSPECIFIED: ICD-10-CM

## 2025-03-30 DIAGNOSIS — E11.9 TYPE 2 DIABETES MELLITUS WITHOUT COMPLICATIONS: ICD-10-CM

## 2025-03-30 DIAGNOSIS — Z87.891 PERSONAL HISTORY OF NICOTINE DEPENDENCE: ICD-10-CM

## 2025-03-30 DIAGNOSIS — Z98.49 CATARACT EXTRACTION STATUS, UNSPECIFIED EYE: Chronic | ICD-10-CM

## 2025-03-30 DIAGNOSIS — I25.10 ATHEROSCLEROTIC HEART DISEASE OF NATIVE CORONARY ARTERY WITHOUT ANGINA PECTORIS: Chronic | ICD-10-CM

## 2025-03-30 DIAGNOSIS — Z98.890 OTHER SPECIFIED POSTPROCEDURAL STATES: Chronic | ICD-10-CM

## 2025-03-30 DIAGNOSIS — N40.0 BENIGN PROSTATIC HYPERPLASIA WITHOUT LOWER URINARY TRACT SYMPTOMS: ICD-10-CM

## 2025-03-30 DIAGNOSIS — M54.50 LOW BACK PAIN, UNSPECIFIED: ICD-10-CM

## 2025-03-30 DIAGNOSIS — I10 ESSENTIAL (PRIMARY) HYPERTENSION: ICD-10-CM

## 2025-03-30 DIAGNOSIS — I25.10 ATHEROSCLEROTIC HEART DISEASE OF NATIVE CORONARY ARTERY WITHOUT ANGINA PECTORIS: ICD-10-CM

## 2025-03-30 DIAGNOSIS — Z88.1 ALLERGY STATUS TO OTHER ANTIBIOTIC AGENTS: ICD-10-CM

## 2025-03-30 DIAGNOSIS — Z79.01 LONG TERM (CURRENT) USE OF ANTICOAGULANTS: ICD-10-CM

## 2025-03-30 DIAGNOSIS — Z87.442 PERSONAL HISTORY OF URINARY CALCULI: ICD-10-CM

## 2025-03-30 DIAGNOSIS — E03.9 HYPOTHYROIDISM, UNSPECIFIED: ICD-10-CM

## 2025-03-30 DIAGNOSIS — I48.91 UNSPECIFIED ATRIAL FIBRILLATION: ICD-10-CM

## 2025-03-30 LAB
ALBUMIN SERPL ELPH-MCNC: 4.5 G/DL — SIGNIFICANT CHANGE UP (ref 3.5–5.2)
ALP SERPL-CCNC: 59 U/L — SIGNIFICANT CHANGE UP (ref 30–115)
ALT FLD-CCNC: 13 U/L — SIGNIFICANT CHANGE UP (ref 0–41)
ANION GAP SERPL CALC-SCNC: 12 MMOL/L — SIGNIFICANT CHANGE UP (ref 7–14)
AST SERPL-CCNC: 15 U/L — SIGNIFICANT CHANGE UP (ref 0–41)
BASOPHILS NFR BLD AUTO: 0.4 % — SIGNIFICANT CHANGE UP (ref 0–1)
BUN SERPL-MCNC: 22 MG/DL — HIGH (ref 10–20)
CALCIUM SERPL-MCNC: 9.7 MG/DL — SIGNIFICANT CHANGE UP (ref 8.4–10.5)
CHLORIDE SERPL-SCNC: 99 MMOL/L — SIGNIFICANT CHANGE UP (ref 98–110)
CO2 SERPL-SCNC: 28 MMOL/L — SIGNIFICANT CHANGE UP (ref 17–32)
CREAT SERPL-MCNC: 1.2 MG/DL — SIGNIFICANT CHANGE UP (ref 0.7–1.5)
EGFR: 62 ML/MIN/1.73M2 — SIGNIFICANT CHANGE UP
EOSINOPHIL # BLD AUTO: 0.1 K/UL — SIGNIFICANT CHANGE UP (ref 0–0.7)
EOSINOPHIL NFR BLD AUTO: 1.4 % — SIGNIFICANT CHANGE UP (ref 0–8)
GLUCOSE SERPL-MCNC: 126 MG/DL — HIGH (ref 70–99)
HCT VFR BLD CALC: 37.3 % — LOW (ref 42–52)
IMM GRANULOCYTES NFR BLD AUTO: 0.3 % — SIGNIFICANT CHANGE UP (ref 0.1–0.3)
LACTATE SERPL-SCNC: 1.2 MMOL/L — SIGNIFICANT CHANGE UP (ref 0.7–2)
LIDOCAIN IGE QN: 25 U/L — SIGNIFICANT CHANGE UP (ref 7–60)
LYMPHOCYTES # BLD AUTO: 1.77 K/UL — SIGNIFICANT CHANGE UP (ref 1.2–3.4)
MCHC RBC-ENTMCNC: 26.6 PG — LOW (ref 27–31)
MCHC RBC-ENTMCNC: 31.6 G/DL — LOW (ref 32–37)
MCV RBC AUTO: 84 FL — SIGNIFICANT CHANGE UP (ref 80–94)
MONOCYTES # BLD AUTO: 0.65 K/UL — HIGH (ref 0.1–0.6)
NEUTROPHILS # BLD AUTO: 4.64 K/UL — SIGNIFICANT CHANGE UP (ref 1.4–6.5)
NEUTROPHILS NFR BLD AUTO: 64.4 % — SIGNIFICANT CHANGE UP (ref 42.2–75.2)
NRBC BLD AUTO-RTO: 0 /100 WBCS — SIGNIFICANT CHANGE UP (ref 0–0)
PLATELET # BLD AUTO: 171 K/UL — SIGNIFICANT CHANGE UP (ref 130–400)
PMV BLD: 9.6 FL — SIGNIFICANT CHANGE UP (ref 7.4–10.4)
POTASSIUM SERPL-MCNC: 4.2 MMOL/L — SIGNIFICANT CHANGE UP (ref 3.5–5)
POTASSIUM SERPL-SCNC: 4.2 MMOL/L — SIGNIFICANT CHANGE UP (ref 3.5–5)
PROT SERPL-MCNC: 7.1 G/DL — SIGNIFICANT CHANGE UP (ref 6–8)
RBC # BLD: 4.44 M/UL — LOW (ref 4.7–6.1)
RBC # FLD: 14.6 % — HIGH (ref 11.5–14.5)
SODIUM SERPL-SCNC: 139 MMOL/L — SIGNIFICANT CHANGE UP (ref 135–146)
WBC # BLD: 7.21 K/UL — SIGNIFICANT CHANGE UP (ref 4.8–10.8)
WBC # FLD AUTO: 7.21 K/UL — SIGNIFICANT CHANGE UP (ref 4.8–10.8)

## 2025-03-30 PROCEDURE — 99285 EMERGENCY DEPT VISIT HI MDM: CPT | Mod: FS

## 2025-03-30 PROCEDURE — 36415 COLL VENOUS BLD VENIPUNCTURE: CPT

## 2025-03-30 PROCEDURE — 81001 URINALYSIS AUTO W/SCOPE: CPT

## 2025-03-30 PROCEDURE — 83690 ASSAY OF LIPASE: CPT

## 2025-03-30 PROCEDURE — 85025 COMPLETE CBC W/AUTO DIFF WBC: CPT

## 2025-03-30 PROCEDURE — 74177 CT ABD & PELVIS W/CONTRAST: CPT | Mod: MC

## 2025-03-30 PROCEDURE — 80053 COMPREHEN METABOLIC PANEL: CPT

## 2025-03-30 PROCEDURE — 83605 ASSAY OF LACTIC ACID: CPT

## 2025-03-30 PROCEDURE — 96374 THER/PROPH/DIAG INJ IV PUSH: CPT | Mod: XU

## 2025-03-30 PROCEDURE — 99284 EMERGENCY DEPT VISIT MOD MDM: CPT | Mod: 25

## 2025-03-30 RX ORDER — METHOCARBAMOL 500 MG/1
1000 TABLET, FILM COATED ORAL ONCE
Refills: 0 | Status: COMPLETED | OUTPATIENT
Start: 2025-03-30 | End: 2025-03-30

## 2025-03-30 RX ADMIN — METHOCARBAMOL 1000 MILLIGRAM(S): 500 TABLET, FILM COATED ORAL at 23:06

## 2025-03-30 NOTE — ED ADULT NURSE NOTE - NSFALLUNIVINTERV_ED_ALL_ED
Bed/Stretcher in lowest position, wheels locked, appropriate side rails in place/Call bell, personal items and telephone in reach/Instruct patient to call for assistance before getting out of bed/chair/stretcher/Non-slip footwear applied when patient is off stretcher/[place] to call system/Physically safe environment - no spills, clutter or unnecessary equipment/Purposeful proactive rounding/Room/bathroom lighting operational, light cord in reach

## 2025-03-31 LAB
APPEARANCE UR: CLEAR — SIGNIFICANT CHANGE UP
BILIRUB UR-MCNC: NEGATIVE — SIGNIFICANT CHANGE UP
COLOR SPEC: YELLOW — SIGNIFICANT CHANGE UP
DIFF PNL FLD: NEGATIVE — SIGNIFICANT CHANGE UP
GLUCOSE UR QL: NEGATIVE MG/DL — SIGNIFICANT CHANGE UP
KETONES UR-MCNC: NEGATIVE MG/DL — SIGNIFICANT CHANGE UP
NITRITE UR-MCNC: NEGATIVE — SIGNIFICANT CHANGE UP
PH UR: 5.5 — SIGNIFICANT CHANGE UP (ref 5–8)
PROT UR-MCNC: NEGATIVE MG/DL — SIGNIFICANT CHANGE UP
UROBILINOGEN FLD QL: 0.2 MG/DL — SIGNIFICANT CHANGE UP (ref 0.2–1)

## 2025-03-31 PROCEDURE — 74177 CT ABD & PELVIS W/CONTRAST: CPT | Mod: 26

## 2025-03-31 RX ORDER — METHOCARBAMOL 500 MG/1
2 TABLET, FILM COATED ORAL
Qty: 30 | Refills: 0
Start: 2025-03-31 | End: 2025-04-04

## 2025-03-31 RX ADMIN — Medication 4 MILLIGRAM(S): at 00:07

## 2025-03-31 NOTE — ED PROVIDER NOTE - OBJECTIVE STATEMENT
79-year-old male with past medical history of hypertension, diabetes, A-fib on Xarelto, CAD, BPH followed by urology Dr. Saxena, hyperlipidemia, hypothyroidism, and ETELVINA presents to the ED for evaluation of left flank pain x 4 days radiating to the left lower abdomen x 2 days.  patient reports the pain is worse with movement. Patient denies excessive exercising, heavy lifting, rashes, recent trauma, testicular pain, penile pain, nausea, vomiting, fevers, chills, weakness, numbness, or tingling.

## 2025-03-31 NOTE — ED PROVIDER NOTE - PHYSICAL EXAMINATION
Physical Exam    Vital Signs: I have reviewed the initial vital signs.  Constitutional: well-nourished, appears stated age, no acute distress  Eyes: Conjunctiva pink, Sclera clear  Cardiovascular: regular rate, regular rhythm, well-perfused extremities, radial pulses equal and 2+ b/l.   Respiratory: unlabored respiratory effort, clear to auscultation bilaterally no wheezing, rales and rhonchi. pt is speaking full sentences. no accessory muscle use.   Gastrointestinal: soft, non-tender, nondistended abdomen, no pulsatile mass, no rebound, no guarding, (+) left cva tenderness, pinpoint spot where pt is having reproducible tenderness to palpation to the left lumbar paraspinal region  Musculoskeletal: FROM of b/l upper and lower extremities, no midline tenderness, no palpable spinal step offs  Integumentary: warm, dry, no rash  Neurologic: awake, alert, extremities’ motor and sensory functions grossly intact.   Psychiatric: appropriate mood, appropriate affect

## 2025-03-31 NOTE — ED PROVIDER NOTE - CLINICAL SUMMARY MEDICAL DECISION MAKING FREE TEXT BOX
79-year-old male, history of kidney stone, back pain, HTN, DM, HLD, presents with left low back pain for 1 day.  Abdomen soft and nontender, left paraspinal tenderness, no neurologic deficits.  Labs unremarkable.  Urinalysis negative.  CT abdomen no acute pathology.  Given IV fluids, Robaxin, Percocet and morphine with improvement.  Will DC and refer to PCP.

## 2025-03-31 NOTE — ED PROVIDER NOTE - PATIENT PORTAL LINK FT
You can access the FollowMyHealth Patient Portal offered by [facility] by registering at the following website: [email or web address]. By joining [employer]’s FollowMyHealth portal, you will also be able to view your health information using other applications (apps) compatible with our system.

## 2025-03-31 NOTE — ED PROVIDER NOTE - ATTENDING APP SHARED VISIT CONTRIBUTION OF CARE
79-year-old male, history of kidney stone, back pain, HTN, DM, HLD, presents to ED with left lower back pain for 1 day.  Denies trauma.  No fever, dysuria or hematuria.  Reports pain worse with movement.  Exam shows alert patient in no distress, HEENT NCAT PERRL, neck supple, lungs clear, RR S1S2, abdomen soft NT +BS, no CCE, left paraspinal tenderness, neuro A&OX3 GCS 15 no deficits.

## 2025-04-04 ENCOUNTER — OUTPATIENT (OUTPATIENT)
Dept: OUTPATIENT SERVICES | Facility: HOSPITAL | Age: 80
LOS: 1 days | End: 2025-04-04
Payer: MEDICARE

## 2025-04-04 ENCOUNTER — APPOINTMENT (OUTPATIENT)
Dept: CARDIOLOGY | Facility: CLINIC | Age: 80
End: 2025-04-04
Payer: MEDICARE

## 2025-04-04 VITALS
TEMPERATURE: 98 F | RESPIRATION RATE: 16 BRPM | SYSTOLIC BLOOD PRESSURE: 110 MMHG | WEIGHT: 248.9 LBS | HEIGHT: 71.5 IN | OXYGEN SATURATION: 98 % | DIASTOLIC BLOOD PRESSURE: 69 MMHG | HEART RATE: 64 BPM

## 2025-04-04 VITALS
WEIGHT: 250 LBS | DIASTOLIC BLOOD PRESSURE: 70 MMHG | SYSTOLIC BLOOD PRESSURE: 137 MMHG | HEIGHT: 71 IN | HEART RATE: 85 BPM | BODY MASS INDEX: 35 KG/M2

## 2025-04-04 DIAGNOSIS — Z01.818 ENCOUNTER FOR OTHER PREPROCEDURAL EXAMINATION: ICD-10-CM

## 2025-04-04 DIAGNOSIS — Z98.61 CORONARY ANGIOPLASTY STATUS: ICD-10-CM

## 2025-04-04 DIAGNOSIS — N20.0 CALCULUS OF KIDNEY: Chronic | ICD-10-CM

## 2025-04-04 DIAGNOSIS — I48.91 UNSPECIFIED ATRIAL FIBRILLATION: ICD-10-CM

## 2025-04-04 DIAGNOSIS — Z98.890 OTHER SPECIFIED POSTPROCEDURAL STATES: Chronic | ICD-10-CM

## 2025-04-04 DIAGNOSIS — E11.9 TYPE 2 DIABETES MELLITUS W/OUT COMPLICATIONS: ICD-10-CM

## 2025-04-04 DIAGNOSIS — I48.19 OTHER PERSISTENT ATRIAL FIBRILLATION: ICD-10-CM

## 2025-04-04 DIAGNOSIS — I25.10 ATHEROSCLEROTIC HEART DISEASE OF NATIVE CORONARY ARTERY WITHOUT ANGINA PECTORIS: Chronic | ICD-10-CM

## 2025-04-04 DIAGNOSIS — I50.9 HEART FAILURE, UNSPECIFIED: ICD-10-CM

## 2025-04-04 LAB
A1C WITH ESTIMATED AVERAGE GLUCOSE RESULT: 6.4 % — HIGH (ref 4–5.6)
ALBUMIN SERPL ELPH-MCNC: 4.1 G/DL — SIGNIFICANT CHANGE UP (ref 3.5–5.2)
ALP SERPL-CCNC: 55 U/L — SIGNIFICANT CHANGE UP (ref 30–115)
ALT FLD-CCNC: 11 U/L — SIGNIFICANT CHANGE UP (ref 0–41)
ANION GAP SERPL CALC-SCNC: 13 MMOL/L — SIGNIFICANT CHANGE UP (ref 7–14)
AST SERPL-CCNC: 13 U/L — SIGNIFICANT CHANGE UP (ref 0–41)
BASOPHILS # BLD AUTO: 0.01 K/UL — SIGNIFICANT CHANGE UP (ref 0–0.2)
BASOPHILS NFR BLD AUTO: 0.2 % — SIGNIFICANT CHANGE UP (ref 0–1)
BILIRUB SERPL-MCNC: 0.3 MG/DL — SIGNIFICANT CHANGE UP (ref 0.2–1.2)
BUN SERPL-MCNC: 25 MG/DL — HIGH (ref 10–20)
CALCIUM SERPL-MCNC: 9.9 MG/DL — SIGNIFICANT CHANGE UP (ref 8.4–10.5)
CHLORIDE SERPL-SCNC: 103 MMOL/L — SIGNIFICANT CHANGE UP (ref 98–110)
CO2 SERPL-SCNC: 25 MMOL/L — SIGNIFICANT CHANGE UP (ref 17–32)
CREAT SERPL-MCNC: 1.4 MG/DL — SIGNIFICANT CHANGE UP (ref 0.7–1.5)
EGFR: 52 ML/MIN/1.73M2 — LOW
EGFR: 52 ML/MIN/1.73M2 — LOW
EOSINOPHIL # BLD AUTO: 0.06 K/UL — SIGNIFICANT CHANGE UP (ref 0–0.7)
EOSINOPHIL NFR BLD AUTO: 1.1 % — SIGNIFICANT CHANGE UP (ref 0–8)
ESTIMATED AVERAGE GLUCOSE: 137 MG/DL — HIGH (ref 68–114)
GLUCOSE SERPL-MCNC: 127 MG/DL — HIGH (ref 70–99)
HCT VFR BLD CALC: 35.7 % — LOW (ref 42–52)
HGB BLD-MCNC: 11.4 G/DL — LOW (ref 14–18)
IMM GRANULOCYTES NFR BLD AUTO: 0.2 % — SIGNIFICANT CHANGE UP (ref 0.1–0.3)
LYMPHOCYTES # BLD AUTO: 1.68 K/UL — SIGNIFICANT CHANGE UP (ref 1.2–3.4)
LYMPHOCYTES # BLD AUTO: 31 % — SIGNIFICANT CHANGE UP (ref 20.5–51.1)
MCHC RBC-ENTMCNC: 26.6 PG — LOW (ref 27–31)
MCHC RBC-ENTMCNC: 31.9 G/DL — LOW (ref 32–37)
MCV RBC AUTO: 83.2 FL — SIGNIFICANT CHANGE UP (ref 80–94)
MONOCYTES # BLD AUTO: 0.43 K/UL — SIGNIFICANT CHANGE UP (ref 0.1–0.6)
MONOCYTES NFR BLD AUTO: 7.9 % — SIGNIFICANT CHANGE UP (ref 1.7–9.3)
NEUTROPHILS # BLD AUTO: 3.23 K/UL — SIGNIFICANT CHANGE UP (ref 1.4–6.5)
NEUTROPHILS NFR BLD AUTO: 59.6 % — SIGNIFICANT CHANGE UP (ref 42.2–75.2)
NRBC BLD AUTO-RTO: 0 /100 WBCS — SIGNIFICANT CHANGE UP (ref 0–0)
PLATELET # BLD AUTO: 157 K/UL — SIGNIFICANT CHANGE UP (ref 130–400)
PMV BLD: 9.9 FL — SIGNIFICANT CHANGE UP (ref 7.4–10.4)
POTASSIUM SERPL-MCNC: 4.1 MMOL/L — SIGNIFICANT CHANGE UP (ref 3.5–5)
POTASSIUM SERPL-SCNC: 4.1 MMOL/L — SIGNIFICANT CHANGE UP (ref 3.5–5)
PROT SERPL-MCNC: 7 G/DL — SIGNIFICANT CHANGE UP (ref 6–8)
RBC # BLD: 4.29 M/UL — LOW (ref 4.7–6.1)
RBC # FLD: 14.7 % — HIGH (ref 11.5–14.5)
SODIUM SERPL-SCNC: 141 MMOL/L — SIGNIFICANT CHANGE UP (ref 135–146)
WBC # BLD: 5.42 K/UL — SIGNIFICANT CHANGE UP (ref 4.8–10.8)
WBC # FLD AUTO: 5.42 K/UL — SIGNIFICANT CHANGE UP (ref 4.8–10.8)

## 2025-04-04 PROCEDURE — 99214 OFFICE O/P EST MOD 30 MIN: CPT | Mod: 25

## 2025-04-04 PROCEDURE — 80053 COMPREHEN METABOLIC PANEL: CPT

## 2025-04-04 PROCEDURE — 93010 ELECTROCARDIOGRAM REPORT: CPT

## 2025-04-04 PROCEDURE — 83036 HEMOGLOBIN GLYCOSYLATED A1C: CPT

## 2025-04-04 PROCEDURE — 85025 COMPLETE CBC W/AUTO DIFF WBC: CPT

## 2025-04-04 PROCEDURE — 99214 OFFICE O/P EST MOD 30 MIN: CPT

## 2025-04-04 PROCEDURE — 93005 ELECTROCARDIOGRAM TRACING: CPT

## 2025-04-04 PROCEDURE — 36415 COLL VENOUS BLD VENIPUNCTURE: CPT

## 2025-04-04 PROCEDURE — G2211 COMPLEX E/M VISIT ADD ON: CPT

## 2025-04-04 PROCEDURE — 93000 ELECTROCARDIOGRAM COMPLETE: CPT

## 2025-04-04 NOTE — H&P PST ADULT - REASON FOR ADMISSION
Case Type: OP Block TimeSuite: Cath LabProceduralist: Nakul Steele  Confirmed Surgery DateTime: 04- - 0:00PAST DateTime: 04- - 15:30Procedure: PHILIP CARDIOVERSION  ERP?: UnavailableLaterality: N/ALength of Procedure: 180 Minutes  Anesthesia Type: Local Standby

## 2025-04-04 NOTE — H&P PST ADULT - NSICDXPASTSURGICALHX_GEN_ALL_CORE_FT
PAST SURGICAL HISTORY:  CAD (coronary artery disease) stents (4)    H/O Achilles tendon repair     H/O arthroscopy of knee RT -2 AND LT -1    H/O arthroscopy of shoulder RT ROTATOR CUFF 2019    H/O bilateral inguinal hernia repair     H/O hernia repair RT INGUINAL -2 AND LT INGUINAL -1    History of parathyroid surgery     Kidney stone RT ESWL OCT 2019    S/P cardiac cath     S/P cataract surgery rt and lt

## 2025-04-04 NOTE — H&P PST ADULT - NSICDXPASTMEDICALHX_GEN_ALL_CORE_FT
PAST MEDICAL HISTORY:  Afib Sep 2019; on Xarelto    Back pain chronic    BPH (benign prostatic hyperplasia)     Broken ankle     Broken clavicle     CAD (coronary artery disease) STENTS 2000 AND 2002 (4 stents)    Ciprofloxacin adverse reaction     DM (diabetes mellitus)     Dry eyes     DVT (deep venous thrombosis) LEFT LE- 2008 AND 2018 AFTER INJURY    Esophagitis     GERD (gastroesophageal reflux disease)     H/O arthritis     H/O laceration of skin     High cholesterol     HTN (hypertension)     Hypertension complications     Hypothyroid     Myocardial infarction 2000    Obesity     ETELVINA (obstructive sleep apnea) NOT USING cpap    Pain     Seizure last episode 2 yrs ago-REACTION TO CIPRO    Torn Achilles tendon

## 2025-04-04 NOTE — H&P PST ADULT - NSWEIGHTCALCTOOLDRUG_GEN_A_CORE
DOMENIC Lara  Liv Houser  : 1982  MRN: [id number]  CSN: [id number]    Post-operative Day #2  Subjective   Her pain is well controlled. Vaginal bleeding is normal in amount. She is ambulating without difficulty. She is passing gas. She is voiding without difficulty. Her baby is doing well.     Objective     Min/max vitals past 24 hours:   Temp  Min: 98 °F (36.7 °C)  Max: 98.8 °F (37.1 °C)  BP  Min: 122/73  Max: 136/85  Pulse  Min: 56  Max: 65  Resp  Min: 16  Max: 16        General: well developed; well nourished  no acute distress   Abdomen: soft, non-tender; no masses  no umbilical or inginual hernias are present  no hepato-splenomegaly   Pelvic: Not performed   Ext: Calves NT       Results from last 7 days  Lab Units 17  0718 17  2058   WBC 10*3/mm3  --  9.30   HEMOGLOBIN g/dL 9.7* 11.3*   HEMATOCRIT % 29.2* 33.5*   PLATELETS 10*3/mm3  --  236     Lab Results   Component Value Date    RH Positive 2017    HEPBSAG Non-Reactive 2017        Assessment   1. POD #2 S/P Primary   - 1 layer closure     Plan   1. Continue routine post-operative care  2. Ambulate    Ag Montgomery MD  2017  5:42 PM           used

## 2025-04-04 NOTE — H&P PST ADULT - HISTORY OF PRESENT ILLNESS
79y Male presents today for presurgical testing for PHILIP CARDIOVERSION. Per Cardio note dated 4/4/25 "79-year-old man with history of CAD (PCI RCA 2019, PCI LAD 2001; LCx 100%), paroxysmal AF on DOAC, HTN, HLD, hypothyroidism, and ETELVINA presenting for follow up.  ?  Patient previously followed with Dr. Rizzo and was last seen by him in office 9/2022.  ?  8/2023 was admitted for evaluation of chest discomfort and dyspnea on exertion along with bilateral lower extremity edema in the context of starting HRT 4 weeks prior. He was noted to be in rapid AF in the setting of acute on chronic diastolic heart failure. He underwent DCCV and was started on dronedarone 400mg BID.  ?  Recently he had recurrence of AF and was seen by EP with recommendation for cardioversion. He opted to undergo nerve ablation in his knees first, followed by DCCV.  He is also planned for MRI with sedation to evaluate his back."  Patient states above is true and accurate. He reports chronic back and leg pain, but offers no other complaints at this time, now for scheduled procedure.   Patient/guardian denies any CP, palpitations, SOB, cough, or dysuria. No recent URI. +UTI in past 2 weeks.  Stated exercise tolerance is FOS 1, limited due to back and knee pain  ETELVINA screen reviewed    Patient/guardian denies any recent personal exposure to COVID19. Denies any sick contacts. Patient/guardian denies travel within the past 30 days. Patient was instructed to quarantine until after procedure.    Anesthesia Alert  YES--Difficult Airway - Class IV  NO--History of neck surgery or radiation  YES--Limited ROM of neck -  LIMITED EXTENSION NECK  NO--History of Malignant hyperthermia  NO--Personal or family history of Pseudocholinesterase deficiency.  NO--Prior Anesthesia Complication  NO--Latex Allergy  NO--Loose teeth  NO--History of Rheumatoid Arthritis  YES--ETELVINA - CAN NOT TOLERATE DEVICE  YES--Bleeding risk - DAILY XARELTO  NO--Other_____    Duke Activity Status Index (DASI) from [employer]  on 4/4/2025  ** All calculations should be rechecked by clinician prior to use **    RESULT SUMMARY:  30.2 points  The higher the score (maximum 58.2), the higher the functional status.    6.45 METs        INPUTS:  Take care of self —> 2.75 = Yes  Walk indoors —> 1.75 = Yes  Walk 1&ndash;2 blocks on level ground —> 2.75 = Yes  Climb a flight of stairs or walk up a hill —> 5.5 = Yes  Run a short distance —> 0 = No  Do light work around the house —> 2.7 = Yes  Do moderate work around the house —> 3.5 = Yes  Do heavy work around the house —> 0 = No  Do yardwork —> 0 = No  Have sexual relations —> 5.25 = Yes  Participate in moderate recreational activities —> 6 = Yes  Participate in strenuous sports —> 0 = No    Revised Cardiac Risk Index for Pre-Operative Risk from [employer]  on 4/4/2025  ** All calculations should be rechecked by clinician prior to use **    RESULT SUMMARY:  2 points  RCRI Score    10.1 %  Risk of major cardiac event      INPUTS:  High-risk surgery —> 1 = Yes  History of ischemic heart disease —> 1 = Yes  History of congestive heart failure —> 0 = No  History of cerebrovascular disease —> 0 = No  Pre-operative treatment with insulin —> 0 = No  Pre-operative creatinine >2 mg/dL / 176.8 µmol/L —> 0 = No    Diabetes education given during PAST visit.    Patient/guardian states that this is their complete medical history and list of medications.  Patient/guardian understands instructions given during this visit and was given the opportunity to ask questions and have them answered. They were instructed to follow up with their surgeon/surgeon's office with any questions regarding their procedure.

## 2025-04-04 NOTE — H&P PST ADULT - NSICDXFAMILYHX_GEN_ALL_CORE_FT
FAMILY HISTORY:  Mother  Still living? No  FH: hepatic cirrhosis, Age at diagnosis: Age Unknown    Child  Still living? No  FH: lung cancer, Age at diagnosis: Age Unknown  FH: thyroid cancer, Age at diagnosis: Age Unknown

## 2025-04-05 DIAGNOSIS — Z01.818 ENCOUNTER FOR OTHER PREPROCEDURAL EXAMINATION: ICD-10-CM

## 2025-04-05 DIAGNOSIS — I48.19 OTHER PERSISTENT ATRIAL FIBRILLATION: ICD-10-CM

## 2025-04-05 PROBLEM — G47.30 SLEEP APNEA, UNSPECIFIED: Chronic | Status: INACTIVE | Noted: 2019-11-04 | Resolved: 2025-04-04

## 2025-04-11 ENCOUNTER — RESULT REVIEW (OUTPATIENT)
Age: 80
End: 2025-04-11

## 2025-04-11 ENCOUNTER — OUTPATIENT (OUTPATIENT)
Dept: OUTPATIENT SERVICES | Facility: HOSPITAL | Age: 80
LOS: 1 days | Discharge: HOME | End: 2025-04-11
Payer: MEDICARE

## 2025-04-11 VITALS — WEIGHT: 250 LBS | HEIGHT: 71 IN

## 2025-04-11 DIAGNOSIS — Z98.890 OTHER SPECIFIED POSTPROCEDURAL STATES: Chronic | ICD-10-CM

## 2025-04-11 DIAGNOSIS — Z98.49 CATARACT EXTRACTION STATUS, UNSPECIFIED EYE: Chronic | ICD-10-CM

## 2025-04-11 DIAGNOSIS — I48.19 OTHER PERSISTENT ATRIAL FIBRILLATION: ICD-10-CM

## 2025-04-11 DIAGNOSIS — I25.10 ATHEROSCLEROTIC HEART DISEASE OF NATIVE CORONARY ARTERY WITHOUT ANGINA PECTORIS: Chronic | ICD-10-CM

## 2025-04-11 DIAGNOSIS — N20.0 CALCULUS OF KIDNEY: Chronic | ICD-10-CM

## 2025-04-11 PROCEDURE — 92960 CARDIOVERSION ELECTRIC EXT: CPT

## 2025-04-11 PROCEDURE — 93312 ECHO TRANSESOPHAGEAL: CPT | Mod: 26,XU

## 2025-04-11 PROCEDURE — 93312 ECHO TRANSESOPHAGEAL: CPT

## 2025-04-11 PROCEDURE — 93320 DOPPLER ECHO COMPLETE: CPT | Mod: 26

## 2025-04-11 PROCEDURE — 93325 DOPPLER ECHO COLOR FLOW MAPG: CPT

## 2025-04-11 PROCEDURE — 93320 DOPPLER ECHO COMPLETE: CPT

## 2025-04-11 PROCEDURE — 93325 DOPPLER ECHO COLOR FLOW MAPG: CPT | Mod: 26

## 2025-04-11 NOTE — CHART NOTE - NSCHARTNOTEFT_GEN_A_CORE
PACU ANESTHESIA ADMISSION NOTE      Procedure:   Post op diagnosis:      ____  Intubated  TV:______       Rate: ______      FiO2: ______    __x__  Patent Airway    __x__  Full return of protective reflexes    __x__  Full recovery from anesthesia / back to baseline status    Vitals:  T(C): --  HR: --  BP: --  RR: --  SpO2: --    Mental Status:  __x__ Awake   ___x__ Alert   _____ Drowsy   _____ Sedated    Nausea/Vomiting:  __x__ NO  ______Yes,   See Post - Op Orders          Pain Scale (0-10):  _____    Treatment: ____ None    __x__ See Post - Op/PCA Orders    Post - Operative Fluids:   ____ Oral   __x__ See Post - Op Orders    Plan: Discharge:   ____Home       _____Floor     _____Critical Care    _____  Other:_________________    Comments: Patient had smooth intraoperative event, no anesthesia complication.  PACU Vital signs: HR:     60       BP:  132      /74          RR:     14        O2 Sat:    98   %     Temp 37C

## 2025-04-11 NOTE — CHART NOTE - NSCHARTNOTEFT_GEN_A_CORE
POST OPERATIVE PROCEDURAL DOCUMENTATION  PRE-OP DIAGNOSIS: Afib    POST-OP DIAGNOSIS: NSR    PROCEDURE: Transesophageal Echocardiogram and DCCV    Primary Physician: Dr. Steele  Cardiology Fellow: Brando Carias    ANESTHESIA TYPE  [ x ] As per anesthesia       CONDITION  [ x ] Good    SPECIMENS REMOVED (IF APPLICABLE): N/A    IMPLANTS (IF APPLICABLE): None    ESTIMATED BLOOD LOSS: None    COMPLICATIONS: None    After risks and benefits of procedures were explained, informed consent was obtained and placed in chart.   The patient received topical anesthetic to the oropharynx with viscous lidocaine and benzocaine spray.  Refer to Anesthesia note for sedation details.  The PHILIP probe was passed into the esophagus without difficulty.  Transesophageal and transgastric images were obtained.  The PHILIP probe was removed without difficulty and examined.  There was no evidence for bleeding.  The patient tolerated the procedure well without any immediate PHILIP-related complications.      Preliminary Findings:  LA: Mildly enlarged  LISSETH: Left atrial appendage was clear of clot, has mild smoke. Decreased doppler velocities   LV: LVEF was estimated at 55-60%  MV: Moderate MR   AV: Mild AI  RA: Mildly enlarged  RV: Normal size and function  TV: Trace TR  PV: No WY, no PS  IAS: PFO with L->R shunt by color doppler  Aorta: There was mild, non-mobile atheroma seen in the thoracic aorta.    Patient successfully converted to sinus rhythm with 200 J of synchronized direct current cardioversion (DCCV) via AP pads.        DIAGNOSIS/IMPRESSION:    Full report to follow    PLAN OF CARE:  [x] Discharge home    [x] Continue xarelto      Results of procedure/ plan of care discussed with patient/  in detail. POST OPERATIVE PROCEDURAL DOCUMENTATION  PRE-OP DIAGNOSIS: Afib    POST-OP DIAGNOSIS: NSR    PROCEDURE: Transesophageal Echocardiogram and DCCV    Primary Physician: Dr. Steele  Cardiology Fellow: Brando Carias    ANESTHESIA TYPE  [ x ] As per anesthesia       CONDITION  [ x ] Good    SPECIMENS REMOVED (IF APPLICABLE): N/A    IMPLANTS (IF APPLICABLE): None    ESTIMATED BLOOD LOSS: None    COMPLICATIONS: None    After risks and benefits of procedures were explained, informed consent was obtained and placed in chart.   The patient received topical anesthetic to the oropharynx with viscous lidocaine and benzocaine spray.  Refer to Anesthesia note for sedation details.  The PHILIP probe was passed into the esophagus without difficulty.  Transesophageal and transgastric images were obtained.  The PHILIP probe was removed without difficulty and examined.  There was no evidence for bleeding.  The patient tolerated the procedure well without any immediate PHILIP-related complications.      Preliminary Findings:  LA: enlarged  LISSETH: Left atrial appendage was clear of clot, has mild smoke. Decreased doppler velocities   LV: LVEF was estimated at 55-60%  MV: Moderate MR with pulmonary venous systolic flow blunting  AV: Mild AI  RA: enlarged  RV: Normal size and function  TV: Trace TR  PV: No IA, no PS  IAS: PFO with L->R shunt by color doppler  Aorta: There was mild, non-mobile atheroma seen in the thoracic aorta.    Patient successfully converted to sinus rhythm with 200 J of synchronized direct current cardioversion (DCCV) via AP pads.    DIAGNOSIS/IMPRESSION:    Full report to follow    PLAN OF CARE:  [x] Discharge home    [x] Continue xarelto    Results of procedure/ plan of care discussed with patient/  in detail.

## 2025-04-11 NOTE — PRE-ANESTHESIA EVALUATION ADULT - HEIGHT IN CM
Ari Grey,     If you are due for any health screening(s) below please notify me so we can arrange them to be ordered and scheduled to maintain your health. Most healthy patients complete it. Don't lose out on improving your health.     Health Maintenance   Topic Date Due    Hepatitis C Screening  Never done    Mammogram  11/11/2022    TETANUS VACCINE  06/26/2023    Lipid Panel  07/01/2025 June 21, 2022       Alina Aguilar  [address] [place] [id number]           Dear Alina Grey:    Your [facility] care is dedicated to helping you stay healthy with regular scheduled recommended screenings.  Scheduling routine screenings is important to maintaining good health. Our records indicate that you may be overdue for your screening pap smear.  Pap smear screening can help identify patients at risk for developing cervical cancer at an early stage, when it is most likely to be successfully treated.    The current recommendation for Pap smear screening is every 3-5 years.  We encourage you to schedule your appointment with your [facility] health provider.  Many women see a Gynecologist for this screening but some primary care providers also provide Pap screening  If you recently had your pap smear screening performed outside of [facility], please let your Health care team know so that they can update your health record.      If you have questions or want to schedule your screening elsewhere, please call [phone] to speak to a [facility] coordinator.    Sincerely,    Your [facility] Care Team               [phone]

## 2025-04-14 ENCOUNTER — OUTPATIENT (OUTPATIENT)
Dept: OUTPATIENT SERVICES | Facility: HOSPITAL | Age: 80
LOS: 1 days | End: 2025-04-14
Payer: MEDICARE

## 2025-04-14 VITALS
DIASTOLIC BLOOD PRESSURE: 76 MMHG | RESPIRATION RATE: 16 BRPM | SYSTOLIC BLOOD PRESSURE: 150 MMHG | WEIGHT: 257.06 LBS | HEIGHT: 71 IN | HEART RATE: 74 BPM | TEMPERATURE: 98 F | OXYGEN SATURATION: 99 %

## 2025-04-14 DIAGNOSIS — Z98.890 OTHER SPECIFIED POSTPROCEDURAL STATES: Chronic | ICD-10-CM

## 2025-04-14 DIAGNOSIS — I25.10 ATHEROSCLEROTIC HEART DISEASE OF NATIVE CORONARY ARTERY WITHOUT ANGINA PECTORIS: Chronic | ICD-10-CM

## 2025-04-14 DIAGNOSIS — Z01.818 ENCOUNTER FOR OTHER PREPROCEDURAL EXAMINATION: ICD-10-CM

## 2025-04-14 DIAGNOSIS — I48.91 UNSPECIFIED ATRIAL FIBRILLATION: ICD-10-CM

## 2025-04-14 DIAGNOSIS — N20.0 CALCULUS OF KIDNEY: Chronic | ICD-10-CM

## 2025-04-14 DIAGNOSIS — Z98.49 CATARACT EXTRACTION STATUS, UNSPECIFIED EYE: Chronic | ICD-10-CM

## 2025-04-14 PROBLEM — S42.009A FRACTURE OF UNSPECIFIED PART OF UNSPECIFIED CLAVICLE, INITIAL ENCOUNTER FOR CLOSED FRACTURE: Chronic | Status: ACTIVE | Noted: 2025-04-04

## 2025-04-14 PROBLEM — Z87.828 PERSONAL HISTORY OF OTHER (HEALED) PHYSICAL INJURY AND TRAUMA: Chronic | Status: ACTIVE | Noted: 2025-04-04

## 2025-04-14 PROBLEM — Z87.39 PERSONAL HISTORY OF OTHER DISEASES OF THE MUSCULOSKELETAL SYSTEM AND CONNECTIVE TISSUE: Chronic | Status: ACTIVE | Noted: 2025-04-04

## 2025-04-14 PROCEDURE — 93010 ELECTROCARDIOGRAM REPORT: CPT

## 2025-04-14 PROCEDURE — 93005 ELECTROCARDIOGRAM TRACING: CPT

## 2025-04-14 PROCEDURE — 99214 OFFICE O/P EST MOD 30 MIN: CPT | Mod: 25

## 2025-04-14 RX ORDER — DULOXETINE 20 MG/1
1 CAPSULE, DELAYED RELEASE ORAL
Refills: 0 | DISCHARGE

## 2025-04-14 NOTE — H&P PST ADULT - HISTORY OF PRESENT ILLNESS
Pt reports he recently had a PHILIP done when a lesion was noted on one of his kidneys and on his live. He denies any concerns/symptoms. He is unable to tolerate MRI as he feels claustrophobic. Now proceeding with above.    Denies any chest pain, difficulty breathing, SOB, palpitations, dysuria, URI, or any other infections in the last 2 weeks. Denies any suicidal or homicidal ideations. ETELVINA reviewed with patient.     Endorses this is their full medical & surgical history including medications prescribed. Pt verbalized understanding of all pre-operative instructions and was given the opportunity to ask questions and have them answered. They were instructed to follow up with their surgeon/proceduralists office with any additional questions regarding their procedure.    Anesthesia Alert  YES--Difficult Airway: Class IV  NO--History of neck surgery or radiation  YES--Limited ROM of neck  NO--History of Malignant hyperthermia  NO--No personal or family history of Pseudocholinesterase deficiency.  NO--Prior Anesthesia Complication  NO--Latex Allergy  NO--Loose teeth  NO--History of Rheumatoid Arthritis  YES--ETELVINA  YES--Bleeding Risk  NO--Other_____    Revised Cardiac Risk Index for Pre-Operative Risk  RESULT SUMMARY:  1 points  RCRI Score    6.0 %  Risk of major cardiac event      INPUTS:  High-risk surgery —> 0 = No  History of ischemic heart disease —> 1 = Yes  History of congestive heart failure —> 0 = No  History of cerebrovascular disease —> 0 = No  Pre-operative treatment with insulin —> 0 = No  Pre-operative creatinine >2 mg/dL / 176.8 µmol/L —> 0 = No    Duke Activity Status Index (DASI)  RESULT SUMMARY:  24.2 points  The higher the score (maximum 58.2), the higher the functional status.    5.72 METs        INPUTS:  Take care of self —> 2.75 = Yes  Walk indoors —> 1.75 = Yes  Walk 1&ndash;2 blocks on level ground —> 2.75 = Yes  Climb a flight of stairs or walk up a hill —> 5.5 = Yes  Run a short distance —> 0 = No  Do light work around the house —> 2.7 = Yes  Do moderate work around the house —> 3.5 = Yes  Do heavy work around the house —> 0 = No  Do yardwork —> 0 = No  Have sexual relations —> 5.25 = Yes  Participate in moderate recreational activities —> 0 = No  Participate in strenuous sports —> 0 = No

## 2025-04-14 NOTE — H&P PST ADULT - REASON FOR ADMISSION
78 yo male presents for PAST in preparation for MRI kidney and liver on 4/21/2025 under general anesthesia by Dr. Becerril (Radiology).

## 2025-04-15 DIAGNOSIS — Z01.818 ENCOUNTER FOR OTHER PREPROCEDURAL EXAMINATION: ICD-10-CM

## 2025-04-21 ENCOUNTER — OUTPATIENT (OUTPATIENT)
Dept: OUTPATIENT SERVICES | Facility: HOSPITAL | Age: 80
LOS: 1 days | End: 2025-04-21
Payer: MEDICARE

## 2025-04-21 VITALS
TEMPERATURE: 97 F | HEIGHT: 71 IN | DIASTOLIC BLOOD PRESSURE: 80 MMHG | RESPIRATION RATE: 18 BRPM | HEART RATE: 79 BPM | SYSTOLIC BLOOD PRESSURE: 134 MMHG | OXYGEN SATURATION: 97 % | WEIGHT: 250 LBS

## 2025-04-21 VITALS
TEMPERATURE: 97 F | DIASTOLIC BLOOD PRESSURE: 79 MMHG | RESPIRATION RATE: 19 BRPM | HEART RATE: 75 BPM | SYSTOLIC BLOOD PRESSURE: 128 MMHG | OXYGEN SATURATION: 100 %

## 2025-04-21 DIAGNOSIS — D37.6 NEOPLASM OF UNCERTAIN BEHAVIOR OF LIVER, GALLBLADDER AND BILE DUCTS: ICD-10-CM

## 2025-04-21 DIAGNOSIS — Z98.890 OTHER SPECIFIED POSTPROCEDURAL STATES: Chronic | ICD-10-CM

## 2025-04-21 DIAGNOSIS — Z00.8 ENCOUNTER FOR OTHER GENERAL EXAMINATION: ICD-10-CM

## 2025-04-21 DIAGNOSIS — Z98.49 CATARACT EXTRACTION STATUS, UNSPECIFIED EYE: Chronic | ICD-10-CM

## 2025-04-21 DIAGNOSIS — R93.2 ABNORMAL FINDINGS ON DIAGNOSTIC IMAGING OF LIVER AND BILIARY TRACT: ICD-10-CM

## 2025-04-21 DIAGNOSIS — N20.0 CALCULUS OF KIDNEY: Chronic | ICD-10-CM

## 2025-04-21 DIAGNOSIS — I25.10 ATHEROSCLEROTIC HEART DISEASE OF NATIVE CORONARY ARTERY WITHOUT ANGINA PECTORIS: Chronic | ICD-10-CM

## 2025-04-21 LAB
GLUCOSE BLDC GLUCOMTR-MCNC: 122 MG/DL — HIGH (ref 70–99)
GLUCOSE BLDC GLUCOMTR-MCNC: 123 MG/DL — HIGH (ref 70–99)

## 2025-04-21 PROCEDURE — 74183 MRI ABD W/O CNTR FLWD CNTR: CPT | Mod: 26

## 2025-04-21 PROCEDURE — 74183 MRI ABD W/O CNTR FLWD CNTR: CPT

## 2025-04-21 PROCEDURE — 82962 GLUCOSE BLOOD TEST: CPT

## 2025-04-21 PROCEDURE — A9579: CPT

## 2025-04-21 RX ORDER — TRAMADOL HYDROCHLORIDE 50 MG/1
1 TABLET, FILM COATED ORAL
Refills: 0 | DISCHARGE

## 2025-04-21 RX ORDER — METOPROLOL SUCCINATE 50 MG/1
1 TABLET, EXTENDED RELEASE ORAL
Refills: 0 | DISCHARGE

## 2025-04-21 NOTE — CHART NOTE - NSCHARTNOTEFT_GEN_A_CORE
PACU ANESTHESIA ADMISSION NOTE      Procedure:   Post op diagnosis:      ____  Intubated  TV:______       Rate: ______      FiO2: ______    _x___  Patent Airway    _x___  Full return of protective reflexes    __x__  Full recovery from anesthesia / back to baseline     Vitals:   T:  37         R:  20                BP: 117/60                 Sat: 100                  P: 65      Mental Status:  _x___ Awake   __x___ Alert   _____ Drowsy   _____ Sedated    Nausea/Vomiting:  _x___ NO  ______Yes,   See Post - Op Orders          Pain Scale (0-10):  _0____    Treatment: ____ None    ____ See Post - Op/PCA Orders    Post - Operative Fluids:   _x___ Oral   ____ See Post - Op Orders    Plan: Discharge:  x ____Home       _____Floor     _____Critical Care    _____  Other:_________________    Comments:

## 2025-04-21 NOTE — ASU PATIENT PROFILE, ADULT - FALL HARM RISK - UNIVERSAL INTERVENTIONS
Bed in lowest position, wheels locked, appropriate side rails in place/Call bell, personal items and telephone in reach/Instruct patient to call for assistance before getting out of bed or chair/Non-slip footwear when patient is out of bed/[place] to call system/Physically safe environment - no spills, clutter or unnecessary equipment/Purposeful Proactive Rounding/Room/bathroom lighting operational, light cord in reach Dizziness for 2 days , called primary care and was told to come to er.,   Patient denies dizziness at present . States this morning it was so bad she could not get out of bed.

## 2025-04-22 DIAGNOSIS — D37.6 NEOPLASM OF UNCERTAIN BEHAVIOR OF LIVER, GALLBLADDER AND BILE DUCTS: ICD-10-CM

## 2025-04-28 PROBLEM — S86.019A STRAIN OF UNSPECIFIED ACHILLES TENDON, INITIAL ENCOUNTER: Chronic | Status: ACTIVE | Noted: 2025-04-04

## 2025-04-28 PROBLEM — S82.899A OTHER FRACTURE OF UNSPECIFIED LOWER LEG, INITIAL ENCOUNTER FOR CLOSED FRACTURE: Chronic | Status: ACTIVE | Noted: 2025-04-04

## 2025-04-30 ENCOUNTER — APPOINTMENT (OUTPATIENT)
Dept: CARDIOLOGY | Facility: CLINIC | Age: 80
End: 2025-04-30
Payer: MEDICARE

## 2025-04-30 PROCEDURE — 93306 TTE W/DOPPLER COMPLETE: CPT

## 2025-05-07 ENCOUNTER — OUTPATIENT (OUTPATIENT)
Dept: OUTPATIENT SERVICES | Facility: HOSPITAL | Age: 80
LOS: 1 days | Discharge: HOME | End: 2025-05-07
Payer: MEDICARE

## 2025-05-07 ENCOUNTER — APPOINTMENT (OUTPATIENT)
Dept: SLEEP CENTER | Facility: HOSPITAL | Age: 80
End: 2025-05-07

## 2025-05-07 DIAGNOSIS — G47.33 OBSTRUCTIVE SLEEP APNEA (ADULT) (PEDIATRIC): ICD-10-CM

## 2025-05-07 DIAGNOSIS — Z98.49 CATARACT EXTRACTION STATUS, UNSPECIFIED EYE: Chronic | ICD-10-CM

## 2025-05-07 DIAGNOSIS — Z98.890 OTHER SPECIFIED POSTPROCEDURAL STATES: Chronic | ICD-10-CM

## 2025-05-07 DIAGNOSIS — I25.10 ATHEROSCLEROTIC HEART DISEASE OF NATIVE CORONARY ARTERY WITHOUT ANGINA PECTORIS: Chronic | ICD-10-CM

## 2025-05-07 DIAGNOSIS — N20.0 CALCULUS OF KIDNEY: Chronic | ICD-10-CM

## 2025-05-07 PROCEDURE — 95811 POLYSOM 6/>YRS CPAP 4/> PARM: CPT | Mod: 26

## 2025-05-07 PROCEDURE — 95811 POLYSOM 6/>YRS CPAP 4/> PARM: CPT

## 2025-05-09 DIAGNOSIS — G47.33 OBSTRUCTIVE SLEEP APNEA (ADULT) (PEDIATRIC): ICD-10-CM

## 2025-05-19 ENCOUNTER — EMERGENCY (EMERGENCY)
Facility: HOSPITAL | Age: 80
LOS: 0 days | Discharge: HOME | End: 2025-05-19
Attending: EMERGENCY MEDICINE
Payer: MEDICARE

## 2025-05-19 VITALS
RESPIRATION RATE: 18 BRPM | WEIGHT: 254.85 LBS | HEIGHT: 71 IN | TEMPERATURE: 98 F | SYSTOLIC BLOOD PRESSURE: 108 MMHG | HEART RATE: 78 BPM | DIASTOLIC BLOOD PRESSURE: 72 MMHG | OXYGEN SATURATION: 97 %

## 2025-05-19 VITALS
OXYGEN SATURATION: 98 % | RESPIRATION RATE: 18 BRPM | DIASTOLIC BLOOD PRESSURE: 59 MMHG | TEMPERATURE: 98 F | HEART RATE: 62 BPM | SYSTOLIC BLOOD PRESSURE: 112 MMHG

## 2025-05-19 DIAGNOSIS — Z98.890 OTHER SPECIFIED POSTPROCEDURAL STATES: Chronic | ICD-10-CM

## 2025-05-19 DIAGNOSIS — I25.10 ATHEROSCLEROTIC HEART DISEASE OF NATIVE CORONARY ARTERY WITHOUT ANGINA PECTORIS: Chronic | ICD-10-CM

## 2025-05-19 DIAGNOSIS — E78.5 HYPERLIPIDEMIA, UNSPECIFIED: ICD-10-CM

## 2025-05-19 DIAGNOSIS — R00.2 PALPITATIONS: ICD-10-CM

## 2025-05-19 DIAGNOSIS — R53.83 OTHER FATIGUE: ICD-10-CM

## 2025-05-19 DIAGNOSIS — R53.1 WEAKNESS: ICD-10-CM

## 2025-05-19 DIAGNOSIS — I10 ESSENTIAL (PRIMARY) HYPERTENSION: ICD-10-CM

## 2025-05-19 DIAGNOSIS — R42 DIZZINESS AND GIDDINESS: ICD-10-CM

## 2025-05-19 DIAGNOSIS — E03.9 HYPOTHYROIDISM, UNSPECIFIED: ICD-10-CM

## 2025-05-19 DIAGNOSIS — I48.0 PAROXYSMAL ATRIAL FIBRILLATION: ICD-10-CM

## 2025-05-19 DIAGNOSIS — Z95.5 PRESENCE OF CORONARY ANGIOPLASTY IMPLANT AND GRAFT: ICD-10-CM

## 2025-05-19 DIAGNOSIS — G47.33 OBSTRUCTIVE SLEEP APNEA (ADULT) (PEDIATRIC): ICD-10-CM

## 2025-05-19 DIAGNOSIS — Z98.49 CATARACT EXTRACTION STATUS, UNSPECIFIED EYE: Chronic | ICD-10-CM

## 2025-05-19 DIAGNOSIS — I25.10 ATHEROSCLEROTIC HEART DISEASE OF NATIVE CORONARY ARTERY WITHOUT ANGINA PECTORIS: ICD-10-CM

## 2025-05-19 DIAGNOSIS — Z88.1 ALLERGY STATUS TO OTHER ANTIBIOTIC AGENTS: ICD-10-CM

## 2025-05-19 DIAGNOSIS — N20.0 CALCULUS OF KIDNEY: Chronic | ICD-10-CM

## 2025-05-19 LAB
ALBUMIN SERPL ELPH-MCNC: 4.4 G/DL — SIGNIFICANT CHANGE UP (ref 3.5–5.2)
ALP SERPL-CCNC: 59 U/L — SIGNIFICANT CHANGE UP (ref 30–115)
ALT FLD-CCNC: 11 U/L — SIGNIFICANT CHANGE UP (ref 0–41)
ANION GAP SERPL CALC-SCNC: 13 MMOL/L — SIGNIFICANT CHANGE UP (ref 7–14)
APTT BLD: 35.5 SEC — SIGNIFICANT CHANGE UP (ref 27–39.2)
AST SERPL-CCNC: 16 U/L — SIGNIFICANT CHANGE UP (ref 0–41)
BASOPHILS # BLD AUTO: 0.01 K/UL — SIGNIFICANT CHANGE UP (ref 0–0.2)
BASOPHILS NFR BLD AUTO: 0.2 % — SIGNIFICANT CHANGE UP (ref 0–1)
BILIRUB SERPL-MCNC: 0.3 MG/DL — SIGNIFICANT CHANGE UP (ref 0.2–1.2)
BLD GP AB SCN SERPL QL: SIGNIFICANT CHANGE UP
BUN SERPL-MCNC: 33 MG/DL — HIGH (ref 10–20)
CALCIUM SERPL-MCNC: 10 MG/DL — SIGNIFICANT CHANGE UP (ref 8.4–10.5)
CHLORIDE SERPL-SCNC: 99 MMOL/L — SIGNIFICANT CHANGE UP (ref 98–110)
CO2 SERPL-SCNC: 26 MMOL/L — SIGNIFICANT CHANGE UP (ref 17–32)
CREAT SERPL-MCNC: 1.9 MG/DL — HIGH (ref 0.7–1.5)
EGFR: 36 ML/MIN/1.73M2 — LOW
EGFR: 36 ML/MIN/1.73M2 — LOW
EOSINOPHIL # BLD AUTO: 0.09 K/UL — SIGNIFICANT CHANGE UP (ref 0–0.7)
EOSINOPHIL NFR BLD AUTO: 1.7 % — SIGNIFICANT CHANGE UP (ref 0–8)
GLUCOSE SERPL-MCNC: 125 MG/DL — HIGH (ref 70–99)
HCT VFR BLD CALC: 35.1 % — LOW (ref 42–52)
HGB BLD-MCNC: 11.5 G/DL — LOW (ref 14–18)
IMM GRANULOCYTES NFR BLD AUTO: 0.4 % — HIGH (ref 0.1–0.3)
INR BLD: 1.45 RATIO — HIGH (ref 0.65–1.3)
LYMPHOCYTES # BLD AUTO: 1.63 K/UL — SIGNIFICANT CHANGE UP (ref 1.2–3.4)
LYMPHOCYTES # BLD AUTO: 30.7 % — SIGNIFICANT CHANGE UP (ref 20.5–51.1)
MAGNESIUM SERPL-MCNC: 1.7 MG/DL — LOW (ref 1.8–2.4)
MCHC RBC-ENTMCNC: 27.3 PG — SIGNIFICANT CHANGE UP (ref 27–31)
MCHC RBC-ENTMCNC: 32.8 G/DL — SIGNIFICANT CHANGE UP (ref 32–37)
MCV RBC AUTO: 83.4 FL — SIGNIFICANT CHANGE UP (ref 80–94)
MONOCYTES # BLD AUTO: 0.43 K/UL — SIGNIFICANT CHANGE UP (ref 0.1–0.6)
MONOCYTES NFR BLD AUTO: 8.1 % — SIGNIFICANT CHANGE UP (ref 1.7–9.3)
NEUTROPHILS # BLD AUTO: 3.13 K/UL — SIGNIFICANT CHANGE UP (ref 1.4–6.5)
NEUTROPHILS NFR BLD AUTO: 58.9 % — SIGNIFICANT CHANGE UP (ref 42.2–75.2)
NRBC BLD AUTO-RTO: 0 /100 WBCS — SIGNIFICANT CHANGE UP (ref 0–0)
PHOSPHATE SERPL-MCNC: 3.6 MG/DL — SIGNIFICANT CHANGE UP (ref 2.1–4.9)
PLATELET # BLD AUTO: 141 K/UL — SIGNIFICANT CHANGE UP (ref 130–400)
PMV BLD: 10.1 FL — SIGNIFICANT CHANGE UP (ref 7.4–10.4)
POTASSIUM SERPL-MCNC: 4.3 MMOL/L — SIGNIFICANT CHANGE UP (ref 3.5–5)
POTASSIUM SERPL-SCNC: 4.3 MMOL/L — SIGNIFICANT CHANGE UP (ref 3.5–5)
PROT SERPL-MCNC: 7.3 G/DL — SIGNIFICANT CHANGE UP (ref 6–8)
PROTHROM AB SERPL-ACNC: 17.2 SEC — HIGH (ref 9.95–12.87)
RBC # BLD: 4.21 M/UL — LOW (ref 4.7–6.1)
RBC # FLD: 16.4 % — HIGH (ref 11.5–14.5)
SODIUM SERPL-SCNC: 138 MMOL/L — SIGNIFICANT CHANGE UP (ref 135–146)
WBC # BLD: 5.31 K/UL — SIGNIFICANT CHANGE UP (ref 4.8–10.8)
WBC # FLD AUTO: 5.31 K/UL — SIGNIFICANT CHANGE UP (ref 4.8–10.8)

## 2025-05-19 PROCEDURE — 93005 ELECTROCARDIOGRAM TRACING: CPT

## 2025-05-19 PROCEDURE — 99285 EMERGENCY DEPT VISIT HI MDM: CPT | Mod: 25

## 2025-05-19 PROCEDURE — 93010 ELECTROCARDIOGRAM REPORT: CPT

## 2025-05-19 PROCEDURE — 85610 PROTHROMBIN TIME: CPT

## 2025-05-19 PROCEDURE — 84100 ASSAY OF PHOSPHORUS: CPT

## 2025-05-19 PROCEDURE — 86900 BLOOD TYPING SEROLOGIC ABO: CPT

## 2025-05-19 PROCEDURE — 85730 THROMBOPLASTIN TIME PARTIAL: CPT

## 2025-05-19 PROCEDURE — 99285 EMERGENCY DEPT VISIT HI MDM: CPT

## 2025-05-19 PROCEDURE — 80053 COMPREHEN METABOLIC PANEL: CPT

## 2025-05-19 PROCEDURE — 96374 THER/PROPH/DIAG INJ IV PUSH: CPT

## 2025-05-19 PROCEDURE — 71046 X-RAY EXAM CHEST 2 VIEWS: CPT | Mod: 26

## 2025-05-19 PROCEDURE — 93010 ELECTROCARDIOGRAM REPORT: CPT | Mod: 77

## 2025-05-19 PROCEDURE — 86850 RBC ANTIBODY SCREEN: CPT

## 2025-05-19 PROCEDURE — 83735 ASSAY OF MAGNESIUM: CPT

## 2025-05-19 PROCEDURE — 71046 X-RAY EXAM CHEST 2 VIEWS: CPT

## 2025-05-19 PROCEDURE — 36415 COLL VENOUS BLD VENIPUNCTURE: CPT

## 2025-05-19 PROCEDURE — 86901 BLOOD TYPING SEROLOGIC RH(D): CPT

## 2025-05-19 PROCEDURE — 85025 COMPLETE CBC W/AUTO DIFF WBC: CPT

## 2025-05-19 RX ORDER — SODIUM CHLORIDE 9 G/1000ML
1000 INJECTION, SOLUTION INTRAVENOUS ONCE
Refills: 0 | Status: COMPLETED | OUTPATIENT
Start: 2025-05-19 | End: 2025-05-19

## 2025-05-19 RX ORDER — MAGNESIUM SULFATE 500 MG/ML
2 SYRINGE (ML) INJECTION ONCE
Refills: 0 | Status: COMPLETED | OUTPATIENT
Start: 2025-05-19 | End: 2025-05-19

## 2025-05-19 RX ADMIN — Medication 25 GRAM(S): at 17:34

## 2025-05-19 RX ADMIN — SODIUM CHLORIDE 1000 MILLILITER(S): 9 INJECTION, SOLUTION INTRAVENOUS at 15:10

## 2025-05-19 NOTE — CONSULT NOTE ADULT - SUBJECTIVE AND OBJECTIVE BOX
Patient is a 79y old  Male who presents with a chief complaint of dizziness      HPI: Patient is a 79-year-old man with history of CAD (PCI RCA 2019, PCI LAD 2001; LCx 100%), paroxysmal AF on DOAC, HTN, HLD, hypothyroidism, and ETELVINA sent to ED by PMD for c/o generalized fatigue, weakness and lightheadedness. In the ED pt found to be in AF with HR in 60s. Recently had PHILIP/DCCV on 4/25 and D/Mayur on Metoprolol and Multaq. No syncope.    PAST MEDICAL & SURGICAL HISTORY:  Hypertension complications      GERD (gastroesophageal reflux disease)      ETELVINA (obstructive sleep apnea)  NOT USING cpap      CAD (coronary artery disease)  STENTS 2000 AND 2002 (4 stents)      DVT (deep venous thrombosis)  LEFT LE- 2008 AND 2018 AFTER INJURY      Seizure  last episode 2 yrs ago-REACTION TO CIPRO      High cholesterol      Hypothyroid      Pain      Back pain  chronic      Esophagitis      BPH (benign prostatic hyperplasia)      Myocardial infarction  2000      Afib  Sep 2019; on Xarelto      DM (diabetes mellitus)      HTN (hypertension)      Obesity      Dry eyes      Ciprofloxacin adverse reaction      H/O arthritis      H/O laceration of skin      Broken ankle      Broken clavicle      Torn Achilles tendon      H/O hernia repair  RT INGUINAL -2 AND LT INGUINAL -1      H/O arthroscopy of knee  RT -2 AND LT -1      CAD (coronary artery disease)  stents (4)      History of parathyroid surgery      H/O arthroscopy of shoulder  RT ROTATOR CUFF 2019      S/P cataract surgery  rt and lt      Kidney stone  RT ESWL OCT 2019      S/P cardiac cath      H/O bilateral inguinal hernia repair      H/O Achilles tendon repair                      PREVIOUS DIAGNOSTIC TESTING:      ECHO  FINDINGS:  < from: TTE Echo Complete w/o Contrast w/ Doppler (08.07.23 @ 08:20) >  Summary:   1. Technically difficult study.   2. Endocardial visualization was enhanced with intravenous echo contrast.   3. Left ventricular ejection fraction, by visual estimation, is >55%.   4. Normal global left ventricular systolic function.   5. The left ventricular diastolic function could not be assessed in this   study.   6. Left atrial enlargement.   7. Mild dilatation of the ascending aorta.    < end of copied text >      STRESS  FINDINGS:    CATHETERIZATION  FINDINGS:    ELECTROPHYSIOLOGY STUDY  FINDINGS:    CAROTID ULTRASOUND:  FINDINGS    VENOUS DUPLEX SCAN:  FINDINGS:    CHEST CT PULMONARY ANGIO with IV Contrast:  FINDINGS:    MEDICATIONS  (STANDING):    MEDICATIONS  (PRN):      FAMILY HISTORY:  FH: lung cancer (Child)    FH: thyroid cancer (Child)    FH: hepatic cirrhosis (Mother)        SOCIAL HISTORY: No smoking, ETOH or illicit drug use    Past Surgical History: see above    Allergies:    Cipro (Other)      REVIEW OF SYSTEMS: No CP, palpitations, dizziness or SOB       Vital Signs Last 24 Hrs  T(C): 36.6 (19 May 2025 15:47), Max: 36.6 (19 May 2025 15:47)  T(F): 97.8 (19 May 2025 15:47), Max: 97.8 (19 May 2025 15:47)  HR: 62 (19 May 2025 15:47) (62 - 85)  BP: 112/59 (19 May 2025 15:47) (108/72 - 134/76)  BP(mean): --  RR: 18 (19 May 2025 15:47) (18 - 20)  SpO2: 98% (19 May 2025 15:47) (97% - 99%)    Parameters below as of 19 May 2025 15:47  Patient On (Oxygen Delivery Method): room air        PHYSICAL EXAM:  GENERAL: In no apparent distress, well nourished, and hydrated.  NECK: Supple, No JVD   HEART: Irregular rate and rhythm; No murmurs, rubs, or gallops.  PULMONARY: Clear to auscultation and perfusion.  No rales, wheezing, or rhonchi bilaterally.  EXTREMITIES:  2+ Peripheral Pulses, no LE edema BL  NEUROLOGICAL: Grossly nonfocal      INTERPRETATION OF TELEMETRY: AF 63 bpm    ECG:  < from: 12 Lead ECG (04.14.25 @ 09:14) >  Ventricular Rate 70 BPM    Atrial Rate 70 BPM    P-R Interval 256 ms    QRS Duration 104 ms    Q-T Interval 432 ms    QTC Calculation(Bazett) 466 ms    P Axis 36 degrees    R Axis -2 degrees    T Axis 22 degrees    Diagnosis Line Sinus rhythm with sinus arrhythmia with 1st degree A-V block  Otherwise normal ECG    Confirmed by Cuca Belle MD ([id number]) on 4/14/2025 6:50:31 PM    < end of copied text >      I&O's Detail      LABS:                        11.5   5.31  )-----------( 141      ( 19 May 2025 15:25 )             35.1     05-19    138  |  99  |  33[H]  ----------------------------<  125[H]  4.3   |  26  |  1.9[H]    Ca    10.0      19 May 2025 15:25  Phos  3.6     05-19  Mg     1.7     05-19    TPro  7.3  /  Alb  4.4  /  TBili  0.3  /  DBili  x   /  AST  16  /  ALT  11  /  AlkPhos  59  05-19          Urinalysis Basic - ( 19 May 2025 15:25 )    Color: x / Appearance: x / SG: x / pH: x  Gluc: 125 mg/dL / Ketone: x  / Bili: x / Urobili: x   Blood: x / Protein: x / Nitrite: x   Leuk Esterase: x / RBC: x / WBC x   Sq Epi: x / Non Sq Epi: x / Bacteria: x      BNP  I&O's Detail    Daily Height in cm: 180.34 (19 May 2025 13:22)    Daily     RADIOLOGY & ADDITIONAL STUDIES:

## 2025-05-19 NOTE — ED PROVIDER NOTE - CLINICAL SUMMARY MEDICAL DECISION MAKING FREE TEXT BOX
79-year-old male presents to the emerged from for evaluation of palpitations and dizziness.  In the emergency department had screening exam, labs and imaging, consult electrophysiology with bedside evaluation by PMD Dr. Hall.  No acute interventions taken, patient stable for discharge per EP, will add in recommendations on medication reconciliation, discussed with patient family bedside expressed understanding, will discharge with outpatient management and return precautions.

## 2025-05-19 NOTE — ED PROVIDER NOTE - PROGRESS NOTE DETAILS
PETER-- seen by EP in the ED who recommend stopping Multaq and lasix for now, follow up outpt with cardiology

## 2025-05-19 NOTE — ED ADULT NURSE NOTE - NSFALLUNIVINTERV_ED_ALL_ED
Bed/Stretcher in lowest position, wheels locked, appropriate side rails in place/Call bell, personal items and telephone in reach/Instruct patient to call for assistance before getting out of bed/chair/stretcher/Non-slip footwear applied when patient is off stretcher/[place] to call system/Physically safe environment - no spills, clutter or unnecessary equipment/Purposeful proactive rounding/Room/bathroom lighting operational, light cord in reach

## 2025-05-19 NOTE — ED ADULT NURSE NOTE - NSICDXPASTSURGICALHX_GEN_ALL_CORE_FT
PAST SURGICAL HISTORY:  CAD (coronary artery disease) stents (4)    H/O Achilles tendon repair     H/O arthroscopy of knee RT -2 AND LT -1    H/O arthroscopy of shoulder RT ROTATOR CUFF 2019    H/O bilateral inguinal hernia repair     H/O hernia repair RT INGUINAL -2 AND LT INGUINAL -1    History of parathyroid surgery     Kidney stone RT ESWL OCT 2019    S/P cardiac cath     S/P cataract surgery rt and lt     Full Thickness Lip Wedge Repair (Flap) Text: Given the location of the defect and the proximity to free margins a full thickness wedge repair was deemed most appropriate.  Using a sterile surgical marker, the appropriate repair was drawn incorporating the defect and placing the expected incisions perpendicular to the vermilion border.  The vermilion border was also meticulously outlined to ensure appropriate reapproximation during the repair.  The area thus outlined was incised through and through with a #15 scalpel blade.  The muscularis and dermis were reaproximated with deep sutures following hemostasis. Care was taken to realign the vermilion border before proceeding with the superficial closure.  Once the vermilion was realigned the superfical and mucosal closure was finished.

## 2025-05-19 NOTE — CONSULT NOTE ADULT - ASSESSMENT
EP: Dr Hall  Cardiologist:  EP: Dr Hall  Cardiologist: Dr Steele    79-year-old man with history of CAD (PCI RCA 2019, PCI LAD 2001; LCx 100%), paroxysmal AF on DOAC, HTN, HLD, hypothyroidism, and ETELVINA sent to ED by PMD for c/o generalized fatigue, weakness and lightheadedness. In the ED pt found to be in AF with HR in 60s.    Impression:  Atrial Fibrillation sp DCCV  CAD sp PCI  HTN  HLD    Plan:  - Stop Multaq and Furosemide  - Cont Metoprolol and all other medications  - Discussed AF Ablation as outpatient, pt will discuss this further at appt on June 20  - No further inpatient EP intervention  - Recall as needed [id number]

## 2025-05-19 NOTE — ED PROVIDER NOTE - OBJECTIVE STATEMENT
79yoM PMHx 79yoM PMHx Afib (s/p recent ablation by Dr. Steele 1 month ago, following with EP Dr. Hall) referred to the ED for evaluation of fatigue and intermittent lightheadedness for the past 1 week. Patient was seen by his PMD today, who found patient to be in possible aflutter and referred him to the ED. Patient denies any chest pain, sob, fever, chills, nausea, vomiting, dark or bloody stools, cough, congestion, or abdominal pain

## 2025-05-19 NOTE — ED ADULT TRIAGE NOTE - CHIEF COMPLAINT QUOTE
Pt c/o palpitations  3 days, saw his PMD and they sent him to ED for aflutter on EKG. Hx of ablation

## 2025-05-19 NOTE — ED PROVIDER NOTE - NSFOLLOWUPINSTRUCTIONS_ED_ALL_ED_FT
Our Emergency Department Referral Coordinators will be reaching out to you in the next 24-48 hours from 9:00am to 5:00pm (Monday to Friday) with a follow up appointment. Please expect a phone call from the hospital in that time frame. If you do not receive a call or if you have any questions or concerns, you can reach them at [phone]    FOLLOW UP WITH CARDIOLOGY AS DISCUSSED      Atrial Flutter  A body outline, with a close-up of the heart, showing the left atrium and the right atrium.   Atrial flutter is a type of abnormal heart rhythm (arrhythmia). The heart has an electrical system that tells it how to beat. In atrial flutter, the signals move rapidly in the top chambers of the heart (the atria). This makes your heart beat very fast. Atrial flutter can come and go, or it can be permanent.    The goal of treatment is to prevent blood clots from forming, control your heart rate, or get your heartbeat back into a normal rhythm. If this condition is not treated, it can cause serious problems, such as a weakened heart muscle (cardiomyopathy) or a stroke.    What are the causes?  This condition is often caused by conditions that damage the heart's electrical system. These include:  Heart conditions and heart surgery. These include heart attacks and open-heart surgery.  Lung problems, such as COPD or a blood clot in the lung (pulmonary embolism, or PE).  Poorly controlled high blood pressure (hypertension).  Diabetes.  Overactive thyroid (hyperthyroidism).  Taking medicines to treat other heart rhythm problems (antiarrhythmic medicines).  Obstructive sleep apnea.  In some cases, the cause of this condition is not known.    What increases the risk?  This condition is more likely to develop in people who:  Are older adults.  Are overweight (obese).  Have a family history of atrial flutter.  Use alcohol or drugs, including cannabis.  Smoke.  What are the signs or symptoms?  Symptoms of this condition include:  A feeling that your heart is pounding or racing (palpitations).  Shortness of breath.  Chest pain.  Feeling dizzy or light-headed.  Fainting.  Low blood pressure (hypotension).  Feeling tired (fatigue).  Tiring easily during exercise or activity.  In some cases, there are no symptoms.    How is this diagnosed?  A person lying on a bed to have an electrocardiogram. Wires go from a machine to electrodes attached to the person's body.   This condition may be diagnosed with:  An electrocardiogram (ECG) to check the electrical signals of your heart.  An ambulatory cardiac monitor to record your heart's activity for a few days.  An echocardiogram. This test uses ultrasound imaging to create pictures of your heart.  A transesophageal echocardiogram (PHILIP). This uses a flexible tube passed down your esophagus to create better pictures of your heart.  A stress test to check your heart's blood supply while you exercise.  Imaging tests, such as a CT scan or chest X-ray.  Blood tests.  How is this treated?  Treatment depends on underlying conditions and how you feel when you have atrial flutter. This condition may be treated with:  Medicines to prevent blood clots or to treat heart rate or heart rhythm problems.  Electrical cardioversion. This uses a jolt of electricity to reset the heart's rhythm.  A procedure to remove the heart tissue that sends abnormal signals (ablation).  Left atrial appendage closure. This is a procedure to seal off a small area of the heart where blood clots can form.  In some cases, underlying conditions will be treated.    Follow these instructions at home:  Medicines    Take over-the-counter and prescription medicines only as told by your health care provider.  Do not take any new medicines without talking to your provider.  If you are taking blood thinners:  Talk with your provider before taking aspirin or NSAIDs. These medicines can raise your risk of bleeding.  Take your medicines as told. Take them at the same time each day.  Do not do things that could hurt or bruise you. Be careful to avoid falls.  Wear an alert bracelet or carry a card that says you take blood thinners.  Lifestyle    Eat heart-healthy foods. Talk with a dietitian to make an eating plan that is right for you.  Do not use any products that contain nicotine or tobacco. These products include cigarettes, chewing tobacco, and vaping devices, such as e-cigarettes. If you need help quitting, ask your provider.  Do not drink alcohol.  Do not use drugs, including cannabis.  Lose weight if you are overweight or obese.  Exercise regularly as told by your provider.  General instructions    Do not use diet pills unless your provider approves. Diet pills may make heart problems worse.  If you have obstructive sleep apnea, manage your condition as told.  Keep all follow-up visits for continued treatment and for your safety.  Contact a health care provider if:  You notice a change in the rate, rhythm, or strength of your heartbeat.  You are taking a blood thinner and you have more bruising.  You have a sudden change in weight.  You tire more easily when you exercise or do heavy work.  Get help right away if you have:  Pain or pressure in your chest.  Shortness of breath.  Fainting.  Increasing sweating with no known cause.  Side effects of blood thinners, such as blood in your vomit, stool, or pee (urine), or bleeding that you cannot stop.  Any symptoms of a stroke. "BE FAST" is an easy way to remember the main warning signs of a stroke:  B - Balance. Signs are dizziness, sudden trouble walking, or loss of balance.  E - Eyes. Signs are trouble seeing or a sudden change in vision.  F - Face. Signs are sudden weakness or numbness of the face, or the face or eyelid drooping on one side.  A - Arms. Signs are weakness or numbness in an arm. This happens suddenly and usually on one side of the body.  S - Speech. Signs are sudden trouble speaking, slurred speech, or trouble understanding what people say.  T - Time. Time to call emergency services. Write down what time symptoms started.  Other signs of a stroke, such as:  A sudden, severe headache with no known cause.  Nausea or vomiting.  Seizure.  These symptoms may be an emergency. Get help right away. Call 911.  Do not wait to see if the symptoms will go away.  Do not drive yourself to the hospital.  This information is not intended to replace advice given to you by your health care provider. Make sure you discuss any questions you have with your health care provider.

## 2025-05-19 NOTE — ED PROVIDER NOTE - ATTENDING CONTRIBUTION TO CARE
I personally evaluated the patient. I reviewed the Resident's or Physician Assistant's note (as assigned above), and agree with the findings and plan except as documented in my note.    79-year-old male presents to the emergency room for palpitations and dizziness.  PMH includes multiple cardiac issues, paroxysmal atrial fibrillation, hypertension, hypothyroidism, hyperlipidemia compliant with medications.  Compliant with outpatient workup as well, recent PHILIP.    Patient is known to local electrophysiology,  Plan was for procedure but patient declined.    GENERAL: male in no distress.   HEENT: EOMI   CHEST: normal work of breathing noted  CV: pulses intact   EXTR: FROM   NEURO: AAO 3 no focal deficits  SKIN: normal no pallor   PSYCH: normal mood & mentation      Impression: Palpitations    Plan: IV, labs, imaging, supportive care & reevaluation

## 2025-05-19 NOTE — ED PROVIDER NOTE - PATIENT PORTAL LINK FT
You can access the FollowMyHealth Patient Portal offered by [facility] by registering at the following website: [email or web address]. By joining [facility]’s FollowMyHealth portal, you will also be able to view your health information using other applications (apps) compatible with our system.

## 2025-05-27 ENCOUNTER — NON-APPOINTMENT (OUTPATIENT)
Age: 80
End: 2025-05-27

## 2025-05-28 ENCOUNTER — APPOINTMENT (OUTPATIENT)
Dept: CARDIOLOGY | Facility: CLINIC | Age: 80
End: 2025-05-28
Payer: MEDICARE

## 2025-05-28 VITALS
HEART RATE: 92 BPM | BODY MASS INDEX: 35 KG/M2 | SYSTOLIC BLOOD PRESSURE: 125 MMHG | DIASTOLIC BLOOD PRESSURE: 71 MMHG | WEIGHT: 250 LBS | HEIGHT: 71 IN

## 2025-05-28 DIAGNOSIS — E11.9 TYPE 2 DIABETES MELLITUS W/OUT COMPLICATIONS: ICD-10-CM

## 2025-05-28 DIAGNOSIS — I25.10 ATHEROSCLEROTIC HEART DISEASE OF NATIVE CORONARY ARTERY W/OUT ANGINA PECTORIS: ICD-10-CM

## 2025-05-28 DIAGNOSIS — I48.91 UNSPECIFIED ATRIAL FIBRILLATION: ICD-10-CM

## 2025-05-28 DIAGNOSIS — I10 ESSENTIAL (PRIMARY) HYPERTENSION: ICD-10-CM

## 2025-05-28 DIAGNOSIS — E78.00 PURE HYPERCHOLESTEROLEMIA, UNSPECIFIED: ICD-10-CM

## 2025-05-28 PROCEDURE — 93000 ELECTROCARDIOGRAM COMPLETE: CPT

## 2025-05-28 PROCEDURE — 99214 OFFICE O/P EST MOD 30 MIN: CPT

## 2025-05-28 PROCEDURE — G2211 COMPLEX E/M VISIT ADD ON: CPT

## 2025-06-04 ENCOUNTER — RESULT REVIEW (OUTPATIENT)
Age: 80
End: 2025-06-04

## 2025-06-04 ENCOUNTER — OUTPATIENT (OUTPATIENT)
Dept: OUTPATIENT SERVICES | Facility: HOSPITAL | Age: 80
LOS: 1 days | End: 2025-06-04
Payer: MEDICARE

## 2025-06-04 DIAGNOSIS — Z98.890 OTHER SPECIFIED POSTPROCEDURAL STATES: Chronic | ICD-10-CM

## 2025-06-04 DIAGNOSIS — I25.10 ATHEROSCLEROTIC HEART DISEASE OF NATIVE CORONARY ARTERY WITHOUT ANGINA PECTORIS: Chronic | ICD-10-CM

## 2025-06-04 DIAGNOSIS — N20.0 CALCULUS OF KIDNEY: Chronic | ICD-10-CM

## 2025-06-04 DIAGNOSIS — I25.10 ATHEROSCLEROTIC HEART DISEASE OF NATIVE CORONARY ARTERY WITHOUT ANGINA PECTORIS: ICD-10-CM

## 2025-06-04 DIAGNOSIS — Z98.49 CATARACT EXTRACTION STATUS, UNSPECIFIED EYE: Chronic | ICD-10-CM

## 2025-06-04 PROCEDURE — 78452 HT MUSCLE IMAGE SPECT MULT: CPT

## 2025-06-04 PROCEDURE — A9500: CPT

## 2025-06-04 PROCEDURE — 78452 HT MUSCLE IMAGE SPECT MULT: CPT | Mod: 26

## 2025-06-04 PROCEDURE — 93018 CV STRESS TEST I&R ONLY: CPT

## 2025-06-05 DIAGNOSIS — I25.10 ATHEROSCLEROTIC HEART DISEASE OF NATIVE CORONARY ARTERY WITHOUT ANGINA PECTORIS: ICD-10-CM

## 2025-06-20 ENCOUNTER — APPOINTMENT (OUTPATIENT)
Dept: ELECTROPHYSIOLOGY | Facility: CLINIC | Age: 80
End: 2025-06-20
Payer: MEDICARE

## 2025-06-20 VITALS — SYSTOLIC BLOOD PRESSURE: 126 MMHG | HEART RATE: 91 BPM | DIASTOLIC BLOOD PRESSURE: 75 MMHG

## 2025-06-20 VITALS — BODY MASS INDEX: 34.72 KG/M2 | WEIGHT: 248 LBS | HEIGHT: 71 IN

## 2025-06-20 PROCEDURE — G2211 COMPLEX E/M VISIT ADD ON: CPT

## 2025-06-20 PROCEDURE — 93000 ELECTROCARDIOGRAM COMPLETE: CPT

## 2025-06-20 PROCEDURE — 99215 OFFICE O/P EST HI 40 MIN: CPT | Mod: 57

## 2025-06-20 RX ORDER — FENOFIBRATE 145 MG/1
145 TABLET, COATED ORAL DAILY
Refills: 0 | Status: ACTIVE | COMMUNITY

## 2025-06-20 RX ORDER — FINASTERIDE 5 MG/1
5 TABLET, FILM COATED ORAL DAILY
Refills: 0 | Status: ACTIVE | COMMUNITY

## 2025-06-20 RX ORDER — DICYCLOMINE HYDROCHLORIDE 20 MG/1
20 TABLET ORAL 3 TIMES DAILY
Refills: 0 | Status: ACTIVE | COMMUNITY

## 2025-06-20 RX ORDER — TRAMADOL HYDROCHLORIDE 50 MG/1
50 TABLET, COATED ORAL
Refills: 0 | Status: ACTIVE | COMMUNITY

## 2025-06-25 ENCOUNTER — OUTPATIENT (OUTPATIENT)
Dept: OUTPATIENT SERVICES | Facility: HOSPITAL | Age: 80
LOS: 1 days | End: 2025-06-25
Payer: MEDICARE

## 2025-06-25 VITALS
HEIGHT: 71 IN | SYSTOLIC BLOOD PRESSURE: 129 MMHG | OXYGEN SATURATION: 97 % | RESPIRATION RATE: 16 BRPM | WEIGHT: 250 LBS | HEART RATE: 98 BPM | DIASTOLIC BLOOD PRESSURE: 62 MMHG | TEMPERATURE: 98 F

## 2025-06-25 DIAGNOSIS — Z98.890 OTHER SPECIFIED POSTPROCEDURAL STATES: Chronic | ICD-10-CM

## 2025-06-25 DIAGNOSIS — Z01.818 ENCOUNTER FOR OTHER PREPROCEDURAL EXAMINATION: ICD-10-CM

## 2025-06-25 DIAGNOSIS — I48.19 OTHER PERSISTENT ATRIAL FIBRILLATION: ICD-10-CM

## 2025-06-25 DIAGNOSIS — Z98.49 CATARACT EXTRACTION STATUS, UNSPECIFIED EYE: Chronic | ICD-10-CM

## 2025-06-25 DIAGNOSIS — N20.0 CALCULUS OF KIDNEY: Chronic | ICD-10-CM

## 2025-06-25 DIAGNOSIS — I25.10 ATHEROSCLEROTIC HEART DISEASE OF NATIVE CORONARY ARTERY WITHOUT ANGINA PECTORIS: Chronic | ICD-10-CM

## 2025-06-25 LAB
ALBUMIN SERPL ELPH-MCNC: 4.2 G/DL — SIGNIFICANT CHANGE UP (ref 3.5–5.2)
ALP SERPL-CCNC: 62 U/L — SIGNIFICANT CHANGE UP (ref 30–115)
ALT FLD-CCNC: 9 U/L — SIGNIFICANT CHANGE UP (ref 0–41)
ANION GAP SERPL CALC-SCNC: 10 MMOL/L — SIGNIFICANT CHANGE UP (ref 7–14)
AST SERPL-CCNC: 15 U/L — SIGNIFICANT CHANGE UP (ref 0–41)
BASOPHILS # BLD AUTO: 0.02 K/UL — SIGNIFICANT CHANGE UP (ref 0–0.2)
BASOPHILS NFR BLD AUTO: 0.4 % — SIGNIFICANT CHANGE UP (ref 0–1)
BILIRUB SERPL-MCNC: 0.3 MG/DL — SIGNIFICANT CHANGE UP (ref 0.2–1.2)
BLD GP AB SCN SERPL QL: SIGNIFICANT CHANGE UP
BUN SERPL-MCNC: 26 MG/DL — HIGH (ref 10–20)
CALCIUM SERPL-MCNC: 10 MG/DL — SIGNIFICANT CHANGE UP (ref 8.4–10.5)
CHLORIDE SERPL-SCNC: 103 MMOL/L — SIGNIFICANT CHANGE UP (ref 98–110)
CO2 SERPL-SCNC: 24 MMOL/L — SIGNIFICANT CHANGE UP (ref 17–32)
CREAT SERPL-MCNC: 1.3 MG/DL — SIGNIFICANT CHANGE UP (ref 0.7–1.5)
EGFR: 57 ML/MIN/1.73M2 — LOW
EGFR: 57 ML/MIN/1.73M2 — LOW
EOSINOPHIL # BLD AUTO: 0.14 K/UL — SIGNIFICANT CHANGE UP (ref 0–0.7)
EOSINOPHIL NFR BLD AUTO: 2.8 % — SIGNIFICANT CHANGE UP (ref 0–8)
GLUCOSE SERPL-MCNC: 120 MG/DL — HIGH (ref 70–99)
HCT VFR BLD CALC: 34.3 % — LOW (ref 42–52)
HGB BLD-MCNC: 11 G/DL — LOW (ref 14–18)
IMM GRANULOCYTES NFR BLD AUTO: 0.2 % — SIGNIFICANT CHANGE UP (ref 0.1–0.3)
LYMPHOCYTES # BLD AUTO: 1.69 K/UL — SIGNIFICANT CHANGE UP (ref 1.2–3.4)
LYMPHOCYTES # BLD AUTO: 33.3 % — SIGNIFICANT CHANGE UP (ref 20.5–51.1)
MCHC RBC-ENTMCNC: 26.8 PG — LOW (ref 27–31)
MCHC RBC-ENTMCNC: 32.1 G/DL — SIGNIFICANT CHANGE UP (ref 32–37)
MCV RBC AUTO: 83.7 FL — SIGNIFICANT CHANGE UP (ref 80–94)
MONOCYTES # BLD AUTO: 0.38 K/UL — SIGNIFICANT CHANGE UP (ref 0.1–0.6)
MONOCYTES NFR BLD AUTO: 7.5 % — SIGNIFICANT CHANGE UP (ref 1.7–9.3)
NEUTROPHILS # BLD AUTO: 2.83 K/UL — SIGNIFICANT CHANGE UP (ref 1.4–6.5)
NEUTROPHILS NFR BLD AUTO: 55.8 % — SIGNIFICANT CHANGE UP (ref 42.2–75.2)
NRBC BLD AUTO-RTO: 0 /100 WBCS — SIGNIFICANT CHANGE UP (ref 0–0)
PLATELET # BLD AUTO: 156 K/UL — SIGNIFICANT CHANGE UP (ref 130–400)
PMV BLD: 9.7 FL — SIGNIFICANT CHANGE UP (ref 7.4–10.4)
POTASSIUM SERPL-MCNC: 4.8 MMOL/L — SIGNIFICANT CHANGE UP (ref 3.5–5)
POTASSIUM SERPL-SCNC: 4.8 MMOL/L — SIGNIFICANT CHANGE UP (ref 3.5–5)
PROT SERPL-MCNC: 6.6 G/DL — SIGNIFICANT CHANGE UP (ref 6–8)
RBC # BLD: 4.1 M/UL — LOW (ref 4.7–6.1)
RBC # FLD: 15.8 % — HIGH (ref 11.5–14.5)
SODIUM SERPL-SCNC: 137 MMOL/L — SIGNIFICANT CHANGE UP (ref 135–146)
WBC # BLD: 5.07 K/UL — SIGNIFICANT CHANGE UP (ref 4.8–10.8)
WBC # FLD AUTO: 5.07 K/UL — SIGNIFICANT CHANGE UP (ref 4.8–10.8)

## 2025-06-25 PROCEDURE — 99214 OFFICE O/P EST MOD 30 MIN: CPT | Mod: 25

## 2025-06-25 PROCEDURE — 80053 COMPREHEN METABOLIC PANEL: CPT

## 2025-06-25 PROCEDURE — 36415 COLL VENOUS BLD VENIPUNCTURE: CPT

## 2025-06-25 PROCEDURE — 86900 BLOOD TYPING SEROLOGIC ABO: CPT

## 2025-06-25 PROCEDURE — 93005 ELECTROCARDIOGRAM TRACING: CPT

## 2025-06-25 PROCEDURE — 93010 ELECTROCARDIOGRAM REPORT: CPT

## 2025-06-25 PROCEDURE — 86850 RBC ANTIBODY SCREEN: CPT

## 2025-06-25 PROCEDURE — 85025 COMPLETE CBC W/AUTO DIFF WBC: CPT

## 2025-06-25 PROCEDURE — 86901 BLOOD TYPING SEROLOGIC RH(D): CPT

## 2025-06-25 NOTE — H&P PST ADULT - HISTORY OF PRESENT ILLNESS
79y Male presents today for presurgical testing for AF ABLATION PHILIP PFA. Per EPS note dated 6/20/25 "Patient with history DVT, ETELVINA t HTN, DM, PCI, CAD, HLD seizures and kidney stone. PAF CHADVASC 4. Patient had h/o syncope. Workup was negative h/o DCCV guided PHILIP in 2020...  06/20/2025: Patient comes to the office for follow-up. Reviewed nuclear stress test and echo. He denies any shortness of breath, dyspnea on exertion, or chest pain. He is able to walk up a flight of 15 stairs without any issues. Yesterday, he walked with his wife in the mall without difficulty....   Patient has decreased EF while in AF.  - Nuclear stress test was performed while in AF with significant reduction in EF from 55% to 35%. Therefore, recommend ablation.  - I have discussed different treatment options with EMILIE CORONA including anti-arrhythmic medications or ablation. After a long discussion, the patient would like to proceed with an ablation."  Patient states above is true and accurate. He denies pain and offers no other complaints at this time, now for scheduled procedure.     Patient/guardian denies any CP, palpitations, SOB, cough, or dysuria. No recent URI or UTI.  Stated exercise tolerance is FOS 1, limited due to knee pain, back pain  ETELVINA screen reviewed    Patient/guardian denies any recent personal exposure to COVID19. Denies any sick contacts. Patient/guardian denies travel within the past 30 days. Patient was instructed to quarantine until after procedure.    Anesthesia Alert  YES--Difficult Airway - Class IV  NO--History of neck surgery or radiation  YES--Limited ROM of neck - LIMITED EXTENSION NECK  NO--History of Malignant hyperthermia  NO--Personal or family history of Pseudocholinesterase deficiency.  NO--Prior Anesthesia Complication  NO--Latex Allergy  NO--Loose teeth  NO--History of Rheumatoid Arthritis  YES--ETELVINA - CAN NOT TOLERATE CPAP  YES--Bleeding risk - DAILY ELIQUIS  NO--Other_____    2 points  RCRI Score  10.1 %  Risk of major cardiac event    28.7 points  The higher the score (maximum 58.2), the higher the functional status.  6.27 METs    Diabetes education given during PAST visit.    Patient/guardian states that this is their complete medical history and list of medications.  Patient/guardian understands instructions given during this visit and was given the opportunity to ask questions and have them answered. They were instructed to follow up with their surgeon/surgeon's office with any questions regarding their procedure.

## 2025-06-25 NOTE — H&P PST ADULT - NSICDXPASTMEDICALHX_GEN_ALL_CORE_FT
PAST MEDICAL HISTORY:  Afib Sep 2019; on Xarelto    Back pain chronic    BPH (benign prostatic hyperplasia)     Broken ankle     Broken clavicle     CAD (coronary artery disease) STENTS 2000 AND 2002 (4 stents)    Ciprofloxacin adverse reaction     DM (diabetes mellitus)     Dry eyes     DVT (deep venous thrombosis) LEFT LE- 2008 AND 2018 AFTER INJURY    Esophagitis     GERD (gastroesophageal reflux disease)     H/O arthritis     H/O fracture of fibula     H/O heart artery stent     H/O inguinal hernia     H/O laceration of skin     High cholesterol     History of torn meniscus of knee     Hypertension complications     Hypothyroid     Myocardial infarction 2000    Obesity     ETELVINA (obstructive sleep apnea) NOT USING cpap    Pain     Seizure last episode 2 yrs ago-REACTION TO CIPRO    Shoulder injury     Torn Achilles tendon

## 2025-06-25 NOTE — H&P PST ADULT - NSICDXPASTSURGICALHX_GEN_ALL_CORE_FT
PAST SURGICAL HISTORY:  H/O Achilles tendon repair     H/O arthroscopy of knee RT -2 AND LT -1    H/O arthroscopy of shoulder RT ROTATOR CUFF 2019    H/O bilateral inguinal hernia repair     H/O hernia repair RT INGUINAL -2 AND LT INGUINAL -1    History of parathyroid surgery     Kidney stone RT ESWL OCT 2019    S/P cardiac cath     S/P cataract surgery rt and lt

## 2025-06-25 NOTE — H&P PST ADULT - REASON FOR ADMISSION
Case Type: OP Block TimeSuite: EP LabProceduralist: Jero Hall  Confirmed Surgery DateTime: 06- - 11:00PAST DateTime: 06- - 14:30Procedure: AF ABLATION PHILIP PFA  ERP?: UnavailableLaterality: N/ALength of Procedure: 180 Minutes  Anesthesia Type: General

## 2025-06-26 DIAGNOSIS — I48.19 OTHER PERSISTENT ATRIAL FIBRILLATION: ICD-10-CM

## 2025-06-26 DIAGNOSIS — Z01.818 ENCOUNTER FOR OTHER PREPROCEDURAL EXAMINATION: ICD-10-CM

## 2025-06-26 PROBLEM — I10 ESSENTIAL (PRIMARY) HYPERTENSION: Chronic | Status: INACTIVE | Noted: 2019-11-04 | Resolved: 2025-06-25

## 2025-06-30 ENCOUNTER — APPOINTMENT (OUTPATIENT)
Dept: ELECTROPHYSIOLOGY | Facility: HOSPITAL | Age: 80
End: 2025-06-30

## 2025-06-30 ENCOUNTER — INPATIENT (INPATIENT)
Facility: HOSPITAL | Age: 80
LOS: 0 days | Discharge: HOME | DRG: 310 | End: 2025-07-01
Attending: STUDENT IN AN ORGANIZED HEALTH CARE EDUCATION/TRAINING PROGRAM | Admitting: STUDENT IN AN ORGANIZED HEALTH CARE EDUCATION/TRAINING PROGRAM
Payer: MEDICARE

## 2025-06-30 ENCOUNTER — TRANSCRIPTION ENCOUNTER (OUTPATIENT)
Age: 80
End: 2025-06-30

## 2025-06-30 ENCOUNTER — RESULT REVIEW (OUTPATIENT)
Age: 80
End: 2025-06-30

## 2025-06-30 VITALS
DIASTOLIC BLOOD PRESSURE: 68 MMHG | SYSTOLIC BLOOD PRESSURE: 151 MMHG | OXYGEN SATURATION: 99 % | HEART RATE: 90 BPM | TEMPERATURE: 98 F | RESPIRATION RATE: 18 BRPM

## 2025-06-30 DIAGNOSIS — Z98.890 OTHER SPECIFIED POSTPROCEDURAL STATES: Chronic | ICD-10-CM

## 2025-06-30 DIAGNOSIS — N20.0 CALCULUS OF KIDNEY: Chronic | ICD-10-CM

## 2025-06-30 DIAGNOSIS — I48.19 OTHER PERSISTENT ATRIAL FIBRILLATION: ICD-10-CM

## 2025-06-30 DIAGNOSIS — Z98.49 CATARACT EXTRACTION STATUS, UNSPECIFIED EYE: Chronic | ICD-10-CM

## 2025-06-30 DIAGNOSIS — I25.10 ATHEROSCLEROTIC HEART DISEASE OF NATIVE CORONARY ARTERY WITHOUT ANGINA PECTORIS: Chronic | ICD-10-CM

## 2025-06-30 LAB
BLD GP AB SCN SERPL QL: SIGNIFICANT CHANGE UP
GLUCOSE BLDC GLUCOMTR-MCNC: 122 MG/DL — HIGH (ref 70–99)

## 2025-06-30 PROCEDURE — C1769: CPT

## 2025-06-30 PROCEDURE — C1730: CPT

## 2025-06-30 PROCEDURE — C1893: CPT

## 2025-06-30 PROCEDURE — 86901 BLOOD TYPING SEROLOGIC RH(D): CPT

## 2025-06-30 PROCEDURE — 93306 TTE W/DOPPLER COMPLETE: CPT | Mod: 26

## 2025-06-30 PROCEDURE — 86850 RBC ANTIBODY SCREEN: CPT

## 2025-06-30 PROCEDURE — C1759: CPT

## 2025-06-30 PROCEDURE — C1894: CPT

## 2025-06-30 PROCEDURE — C1733: CPT

## 2025-06-30 PROCEDURE — C1760: CPT

## 2025-06-30 PROCEDURE — 93657 TX L/R ATRIAL FIB ADDL: CPT

## 2025-06-30 PROCEDURE — 36415 COLL VENOUS BLD VENIPUNCTURE: CPT

## 2025-06-30 PROCEDURE — 83036 HEMOGLOBIN GLYCOSYLATED A1C: CPT

## 2025-06-30 PROCEDURE — 93312 ECHO TRANSESOPHAGEAL: CPT

## 2025-06-30 PROCEDURE — 93318 ECHO TRANSESOPHAGEAL INTRAOP: CPT

## 2025-06-30 PROCEDURE — 80048 BASIC METABOLIC PNL TOTAL CA: CPT

## 2025-06-30 PROCEDURE — 93312 ECHO TRANSESOPHAGEAL: CPT | Mod: 26

## 2025-06-30 PROCEDURE — C1731: CPT

## 2025-06-30 PROCEDURE — C9399: CPT

## 2025-06-30 PROCEDURE — C1766: CPT

## 2025-06-30 PROCEDURE — 93656 COMPRE EP EVAL ABLTJ ATR FIB: CPT

## 2025-06-30 PROCEDURE — 86900 BLOOD TYPING SEROLOGIC ABO: CPT

## 2025-06-30 PROCEDURE — 93005 ELECTROCARDIOGRAM TRACING: CPT

## 2025-06-30 PROCEDURE — 82962 GLUCOSE BLOOD TEST: CPT

## 2025-06-30 RX ORDER — DEXTROSE 50 % IN WATER 50 %
12.5 SYRINGE (ML) INTRAVENOUS ONCE
Refills: 0 | Status: DISCONTINUED | OUTPATIENT
Start: 2025-06-30 | End: 2025-07-01

## 2025-06-30 RX ORDER — PREGABALIN 50 MG/1
50 CAPSULE ORAL DAILY
Refills: 0 | Status: DISCONTINUED | OUTPATIENT
Start: 2025-06-30 | End: 2025-07-01

## 2025-06-30 RX ORDER — TAMSULOSIN HYDROCHLORIDE 0.4 MG/1
0.4 CAPSULE ORAL AT BEDTIME
Refills: 0 | Status: DISCONTINUED | OUTPATIENT
Start: 2025-06-30 | End: 2025-07-01

## 2025-06-30 RX ORDER — METOPROLOL SUCCINATE 50 MG/1
50 TABLET, EXTENDED RELEASE ORAL
Refills: 0 | Status: DISCONTINUED | OUTPATIENT
Start: 2025-06-30 | End: 2025-07-01

## 2025-06-30 RX ORDER — DEXTROSE 50 % IN WATER 50 %
15 SYRINGE (ML) INTRAVENOUS ONCE
Refills: 0 | Status: DISCONTINUED | OUTPATIENT
Start: 2025-06-30 | End: 2025-07-01

## 2025-06-30 RX ORDER — FENOFIBRATE 160 MG/1
145 TABLET ORAL DAILY
Refills: 0 | Status: DISCONTINUED | OUTPATIENT
Start: 2025-06-30 | End: 2025-07-01

## 2025-06-30 RX ORDER — TRAMADOL HYDROCHLORIDE 50 MG/1
50 TABLET, FILM COATED ORAL
Refills: 0 | Status: DISCONTINUED | OUTPATIENT
Start: 2025-06-30 | End: 2025-07-01

## 2025-06-30 RX ORDER — GLUCAGON 3 MG/1
1 POWDER NASAL ONCE
Refills: 0 | Status: DISCONTINUED | OUTPATIENT
Start: 2025-06-30 | End: 2025-07-01

## 2025-06-30 RX ORDER — RIVAROXABAN 10 MG/1
20 TABLET, FILM COATED ORAL
Refills: 0 | Status: DISCONTINUED | OUTPATIENT
Start: 2025-06-30 | End: 2025-07-01

## 2025-06-30 RX ORDER — FUROSEMIDE 10 MG/ML
40 INJECTION INTRAMUSCULAR; INTRAVENOUS DAILY
Refills: 0 | Status: DISCONTINUED | OUTPATIENT
Start: 2025-07-01 | End: 2025-07-01

## 2025-06-30 RX ORDER — CYANOCOBALAMIN 1000 UG/ML
1 INJECTION INTRAMUSCULAR; SUBCUTANEOUS
Refills: 0 | DISCHARGE

## 2025-06-30 RX ORDER — DEXTROSE 50 % IN WATER 50 %
25 SYRINGE (ML) INTRAVENOUS ONCE
Refills: 0 | Status: DISCONTINUED | OUTPATIENT
Start: 2025-06-30 | End: 2025-07-01

## 2025-06-30 RX ORDER — LEVOTHYROXINE SODIUM 300 MCG
150 TABLET ORAL DAILY
Refills: 0 | Status: DISCONTINUED | OUTPATIENT
Start: 2025-07-01 | End: 2025-07-01

## 2025-06-30 RX ORDER — ROSUVASTATIN CALCIUM 5 MG/1
40 TABLET, FILM COATED ORAL AT BEDTIME
Refills: 0 | Status: DISCONTINUED | OUTPATIENT
Start: 2025-06-30 | End: 2025-07-01

## 2025-06-30 RX ORDER — SODIUM CHLORIDE 9 G/1000ML
1000 INJECTION, SOLUTION INTRAVENOUS
Refills: 0 | Status: DISCONTINUED | OUTPATIENT
Start: 2025-06-30 | End: 2025-07-01

## 2025-06-30 RX ORDER — FINASTERIDE 1 MG/1
5 TABLET, FILM COATED ORAL DAILY
Refills: 0 | Status: DISCONTINUED | OUTPATIENT
Start: 2025-06-30 | End: 2025-07-01

## 2025-06-30 RX ORDER — ACETAMINOPHEN 500 MG/5ML
650 LIQUID (ML) ORAL EVERY 6 HOURS
Refills: 0 | Status: DISCONTINUED | OUTPATIENT
Start: 2025-06-30 | End: 2025-07-01

## 2025-06-30 RX ORDER — INSULIN LISPRO 100 U/ML
INJECTION, SOLUTION INTRAVENOUS; SUBCUTANEOUS
Refills: 0 | Status: DISCONTINUED | OUTPATIENT
Start: 2025-06-30 | End: 2025-07-01

## 2025-06-30 RX ADMIN — ROSUVASTATIN CALCIUM 40 MILLIGRAM(S): 5 TABLET, FILM COATED ORAL at 21:38

## 2025-06-30 RX ADMIN — TAMSULOSIN HYDROCHLORIDE 0.4 MILLIGRAM(S): 0.4 CAPSULE ORAL at 21:38

## 2025-06-30 RX ADMIN — RIVAROXABAN 20 MILLIGRAM(S): 10 TABLET, FILM COATED ORAL at 18:21

## 2025-06-30 RX ADMIN — Medication 650 MILLIGRAM(S): at 18:25

## 2025-06-30 NOTE — DISCHARGE NOTE PROVIDER - NSDCFUSCHEDAPPT_GEN_ALL_CORE_FT
Jono Burk  [facility]  [facility] [address]  Scheduled Appointment: 07/24/2025     Jono Burk  [facility]  [facility] [address]  Scheduled Appointment: 07/24/2025    Jero Hall  [facility]  [facility] [address]  Scheduled Appointment: 08/19/2025

## 2025-06-30 NOTE — PROGRESS NOTE ADULT - SUBJECTIVE AND OBJECTIVE BOX
I have personally seen and examined the patient.  I agree with the history and physical which I have reviewed and noted any changes below.  
  Electrophysiology Brief Post-Op Note    PRE-OP DIAGNOSIS: AF    POST-OP DIAGNOSIS: AF    PROCEDURE: ablation    Vendor Representative was present for clinical support.    Physician: Isabel  Assistant: None    ANESTHESIA TYPE:  [ X ]General Anesthesia  [  ] Sedation  [ X ] Local/Regional    ESTIMATED BLOOD LOSS: 100      mL    CONDITION  [  ] Critical  [  ] Serious  [  ]Fair  [ X ]Good      SPECIMENS REMOVED (IF APPLICABLE):  none     IMPLANTS (IF APPLICABLE)  none    FINDINGS: none    COMPLICATIONS: none     PLAN OF CARE    -	Start Xarelto 20 mg qd tonight at 6 pm  -	Start protonix 40 mg daily tonight  -	Bed rest till am  -	Admit to telemetry

## 2025-06-30 NOTE — DISCHARGE NOTE PROVIDER - NSDCMRMEDTOKEN_GEN_ALL_CORE_FT
B-12 2500 mcg oral tablet: 1 tab(s) orally once a day  dicyclomine 20 mg oral tablet: 1 tab(s) orally 3 times a day  famotidine 40 mg oral tablet: 1 tab(s) orally once a day (at bedtime)  fenofibrate 145 mg oral tablet:   finasteride 5 mg oral tablet: 1 orally once a day  Flomax 0.4 mg oral capsule: 1 cap(s) orally once a day (at bedtime)  Lasix 40 mg oral tablet: 1 tab(s) orally once a day  levothyroxine 150 mcg (0.15 mg) oral tablet:   metFORMIN 1000 mg oral tablet: 1 tab(s) orally once a day  metoprolol succinate 50 mg oral capsule, extended release: 1 cap(s) orally once a day  omeprazole 40 mg oral delayed release capsule: 1 cap(s) orally once a day  rosuvastatin 40 mg oral tablet: 1 tab(s) orally once a day  traMADol 50 mg oral tablet: 1 tab(s) orally every 6 hours as needed for  moderate pain  valsartan-hydrochlorothiazide 320 mg-12.5 mg oral tablet: 1 tab(s) orally once a day  Xarelto 20 mg oral tablet: 1 tab(s) orally   B-12 2500 mcg oral tablet: 1 tab(s) orally once a day  dicyclomine 20 mg oral tablet: 1 tab(s) orally 3 times a day  famotidine 40 mg oral tablet: 1 tab(s) orally once a day (at bedtime)  fenofibrate 145 mg oral tablet:   finasteride 5 mg oral tablet: 1 orally once a day  Flomax 0.4 mg oral capsule: 1 cap(s) orally once a day (at bedtime)  Lasix 40 mg oral tablet: 1 tab(s) orally once a day  levothyroxine 150 mcg (0.15 mg) oral tablet:   metFORMIN 1000 mg oral tablet: 1 tab(s) orally once a day  metoprolol succinate 50 mg oral capsule, extended release: 1 cap(s) orally once a day  Protonix 40 mg oral delayed release tablet: 1 tab(s) orally once a day (before a meal)  rosuvastatin 40 mg oral tablet: 1 tab(s) orally once a day  traMADol 50 mg oral tablet: 1 tab(s) orally every 6 hours as needed for  moderate pain  valsartan-hydrochlorothiazide 320 mg-12.5 mg oral tablet: 1 tab(s) orally once a day  Xarelto 20 mg oral tablet: 1 tab(s) orally once a day

## 2025-06-30 NOTE — PRE-ANESTHESIA EVALUATION ADULT - MALLAMPATI CLASS
coronary stent 2014 in [place] Class III - visualization of the soft palate and the base of the uvula

## 2025-06-30 NOTE — ASU PATIENT PROFILE, ADULT - FALL HARM RISK - UNIVERSAL INTERVENTIONS
Bed in lowest position, wheels locked, appropriate side rails in place/Call bell, personal items and telephone in reach/Instruct patient to call for assistance before getting out of bed or chair/Non-slip footwear when patient is out of bed/[place] to call system/Physically safe environment - no spills, clutter or unnecessary equipment/Purposeful Proactive Rounding/Room/bathroom lighting operational, light cord in reach

## 2025-06-30 NOTE — PATIENT PROFILE ADULT - FALL HARM RISK - RISK INTERVENTIONS
Assistance OOB with selected safe patient handling equipment/Assistance with ambulation/Communicate Fall Risk and Risk Factors to all staff, patient, and family/Monitor gait and stability/Reinforce activity limits and safety measures with patient and family/Sit up slowly, dangle for a short time, stand at bedside before walking/Use of alarms - bed, chair and/or voice tab/Visual Cue: Yellow wristband/Bed in lowest position, wheels locked, appropriate side rails in place/Call bell, personal items and telephone in reach/Instruct patient to call for assistance before getting out of bed or chair/Non-slip footwear when patient is out of bed/[place] to call system/Physically safe environment - no spills, clutter or unnecessary equipment/Purposeful Proactive Rounding/Room/bathroom lighting operational, light cord in reach

## 2025-06-30 NOTE — DISCHARGE NOTE PROVIDER - CARE PROVIDER_API CALL
Jero Hall  Clinical Cardiac Electrophysiology  [address] [place] [address], [address]  [place] [address]  Phone: [phone]  Fax: [phone]  Follow Up Time: 1 month

## 2025-06-30 NOTE — DISCHARGE NOTE PROVIDER - NSDCCPCAREPLAN_GEN_ALL_CORE_FT
PRINCIPAL DISCHARGE DIAGNOSIS  Diagnosis: Atrial fibrillation  Assessment and Plan of Treatment: s/p ablation. You must take your xarelto 20mg daily.

## 2025-06-30 NOTE — DISCHARGE NOTE PROVIDER - NSDCCPTREATMENT_GEN_ALL_CORE_FT
PRINCIPAL PROCEDURE  Procedure: Cardiac ablation  Findings and Treatment:   - Please start taking pantoprazole 40 mg daily for 30 days.   - You may shower today.  - Do not drive or operate heavy machinery for 3 days.  - Do not submerge in water (example: baths, swimming) for 1 week.  - No squatting, exertional activities, or lifting anything > 10 lbs for 1 week.  - Any sudden swelling, redness, fever, discharge, or severe pain, call the Electrophysiology Office at [phone].

## 2025-06-30 NOTE — DISCHARGE NOTE PROVIDER - HOSPITAL COURSE
Patient is a 79y Male  with PMHx of PAF CHADVASC 4, DVT, ETELVINA, HTN, DM, PCI, CAD, HLD, seizures, kidney stone. Patient had h/o syncope. Workup was negative h/o DCCV guided PHILIP in 2020. Pt presented to [facility] for elective AF Ablation. On 6/30/25 patient underwent successful (Cryo ablation/RFA) for afib. Patient was monitored overnight. On POD 1 patient remains HD stable with no complaints. Patient remains in SR with no arrhythmias noted on tele. Examination of B/L common femoral venous access sites reveal a Clear and dry area with no hematoma or erythema. For PVI- Patient should continue Xarelto for thromboembolic prophylaxis. Patient is being DC home in stable condition.

## 2025-07-01 ENCOUNTER — TRANSCRIPTION ENCOUNTER (OUTPATIENT)
Age: 80
End: 2025-07-01

## 2025-07-01 VITALS
HEART RATE: 67 BPM | OXYGEN SATURATION: 95 % | DIASTOLIC BLOOD PRESSURE: 74 MMHG | RESPIRATION RATE: 20 BRPM | TEMPERATURE: 97 F | SYSTOLIC BLOOD PRESSURE: 111 MMHG

## 2025-07-01 PROBLEM — Z95.5 PRESENCE OF CORONARY ANGIOPLASTY IMPLANT AND GRAFT: Chronic | Status: ACTIVE | Noted: 2025-06-25

## 2025-07-01 PROBLEM — Z87.828 PERSONAL HISTORY OF OTHER (HEALED) PHYSICAL INJURY AND TRAUMA: Chronic | Status: ACTIVE | Noted: 2025-06-25

## 2025-07-01 PROBLEM — S49.90XA UNSPECIFIED INJURY OF SHOULDER AND UPPER ARM, UNSPECIFIED ARM, INITIAL ENCOUNTER: Chronic | Status: ACTIVE | Noted: 2025-06-25

## 2025-07-01 PROBLEM — Z87.19 PERSONAL HISTORY OF OTHER DISEASES OF THE DIGESTIVE SYSTEM: Chronic | Status: ACTIVE | Noted: 2025-06-25

## 2025-07-01 LAB
A1C WITH ESTIMATED AVERAGE GLUCOSE RESULT: 6.8 % — HIGH (ref 4–5.6)
ANION GAP SERPL CALC-SCNC: 10 MMOL/L — SIGNIFICANT CHANGE UP (ref 7–14)
BUN SERPL-MCNC: 25 MG/DL — HIGH (ref 10–20)
CALCIUM SERPL-MCNC: 9 MG/DL — SIGNIFICANT CHANGE UP (ref 8.4–10.5)
CHLORIDE SERPL-SCNC: 104 MMOL/L — SIGNIFICANT CHANGE UP (ref 98–110)
CO2 SERPL-SCNC: 24 MMOL/L — SIGNIFICANT CHANGE UP (ref 17–32)
CREAT SERPL-MCNC: 1.1 MG/DL — SIGNIFICANT CHANGE UP (ref 0.7–1.5)
EGFR: 69 ML/MIN/1.73M2 — SIGNIFICANT CHANGE UP
EGFR: 69 ML/MIN/1.73M2 — SIGNIFICANT CHANGE UP
ESTIMATED AVERAGE GLUCOSE: 148 MG/DL — HIGH (ref 68–114)
GLUCOSE SERPL-MCNC: 126 MG/DL — HIGH (ref 70–99)
POTASSIUM SERPL-MCNC: 3.9 MMOL/L — SIGNIFICANT CHANGE UP (ref 3.5–5)
POTASSIUM SERPL-SCNC: 3.9 MMOL/L — SIGNIFICANT CHANGE UP (ref 3.5–5)
SODIUM SERPL-SCNC: 138 MMOL/L — SIGNIFICANT CHANGE UP (ref 135–146)

## 2025-07-01 PROCEDURE — 93010 ELECTROCARDIOGRAM REPORT: CPT

## 2025-07-01 PROCEDURE — 99239 HOSP IP/OBS DSCHRG MGMT >30: CPT

## 2025-07-01 RX ADMIN — Medication 150 MICROGRAM(S): at 05:48

## 2025-07-01 RX ADMIN — Medication 20 MILLIGRAM(S): at 05:49

## 2025-07-01 RX ADMIN — Medication 320 MILLIGRAM(S): at 05:48

## 2025-07-01 RX ADMIN — METOPROLOL SUCCINATE 50 MILLIGRAM(S): 50 TABLET, EXTENDED RELEASE ORAL at 05:48

## 2025-07-01 RX ADMIN — Medication 20 MILLIEQUIVALENT(S): at 09:10

## 2025-07-01 RX ADMIN — Medication 40 MILLIGRAM(S): at 05:49

## 2025-07-01 NOTE — DISCHARGE NOTE NURSING/CASE MANAGEMENT/SOCIAL WORK - NSDCPEXARELTODIET_GEN_ALL_CORE
Eat healthy foods you enjoy. Rivaroxaban/Xarelto DOES NOT have a special diet. Limit your alcohol intake. Abnormal VS & WBC

## 2025-07-01 NOTE — DISCHARGE NOTE NURSING/CASE MANAGEMENT/SOCIAL WORK - FINANCIAL ASSISTANCE
[facility] provides services at a reduced cost to those who are determined to be eligible through [facility]’s financial assistance program. Information regarding [facility]’s financial assistance program can be found by going to [email or web address] or by calling [phone].

## 2025-07-07 ENCOUNTER — APPOINTMENT (OUTPATIENT)
Dept: CARDIOLOGY | Facility: CLINIC | Age: 80
End: 2025-07-07
Payer: MEDICARE

## 2025-07-07 VITALS
WEIGHT: 248 LBS | DIASTOLIC BLOOD PRESSURE: 68 MMHG | SYSTOLIC BLOOD PRESSURE: 124 MMHG | HEIGHT: 71 IN | HEART RATE: 77 BPM | BODY MASS INDEX: 34.72 KG/M2

## 2025-07-07 PROBLEM — Z87.81 PERSONAL HISTORY OF (HEALED) TRAUMATIC FRACTURE: Chronic | Status: ACTIVE | Noted: 2025-06-25

## 2025-07-07 PROCEDURE — 93000 ELECTROCARDIOGRAM COMPLETE: CPT

## 2025-07-07 PROCEDURE — 99214 OFFICE O/P EST MOD 30 MIN: CPT

## 2025-07-07 PROCEDURE — G2211 COMPLEX E/M VISIT ADD ON: CPT

## 2025-07-08 DIAGNOSIS — Z79.01 LONG TERM (CURRENT) USE OF ANTICOAGULANTS: ICD-10-CM

## 2025-07-08 DIAGNOSIS — Z95.5 PRESENCE OF CORONARY ANGIOPLASTY IMPLANT AND GRAFT: ICD-10-CM

## 2025-07-08 DIAGNOSIS — E11.9 TYPE 2 DIABETES MELLITUS WITHOUT COMPLICATIONS: ICD-10-CM

## 2025-07-08 DIAGNOSIS — I10 ESSENTIAL (PRIMARY) HYPERTENSION: ICD-10-CM

## 2025-07-08 DIAGNOSIS — Z86.718 PERSONAL HISTORY OF OTHER VENOUS THROMBOSIS AND EMBOLISM: ICD-10-CM

## 2025-07-08 DIAGNOSIS — I48.0 PAROXYSMAL ATRIAL FIBRILLATION: ICD-10-CM

## 2025-07-08 DIAGNOSIS — Z88.1 ALLERGY STATUS TO OTHER ANTIBIOTIC AGENTS: ICD-10-CM

## 2025-07-08 DIAGNOSIS — I25.2 OLD MYOCARDIAL INFARCTION: ICD-10-CM

## 2025-07-08 DIAGNOSIS — N40.0 BENIGN PROSTATIC HYPERPLASIA WITHOUT LOWER URINARY TRACT SYMPTOMS: ICD-10-CM

## 2025-07-08 DIAGNOSIS — I25.10 ATHEROSCLEROTIC HEART DISEASE OF NATIVE CORONARY ARTERY WITHOUT ANGINA PECTORIS: ICD-10-CM

## 2025-07-08 DIAGNOSIS — K21.9 GASTRO-ESOPHAGEAL REFLUX DISEASE WITHOUT ESOPHAGITIS: ICD-10-CM

## 2025-07-08 DIAGNOSIS — Z79.84 LONG TERM (CURRENT) USE OF ORAL HYPOGLYCEMIC DRUGS: ICD-10-CM

## 2025-07-08 DIAGNOSIS — E66.9 OBESITY, UNSPECIFIED: ICD-10-CM

## 2025-07-08 DIAGNOSIS — E03.9 HYPOTHYROIDISM, UNSPECIFIED: ICD-10-CM

## 2025-07-08 DIAGNOSIS — Z79.890 HORMONE REPLACEMENT THERAPY: ICD-10-CM

## 2025-07-08 DIAGNOSIS — G47.33 OBSTRUCTIVE SLEEP APNEA (ADULT) (PEDIATRIC): ICD-10-CM

## 2025-08-27 ENCOUNTER — APPOINTMENT (OUTPATIENT)
Dept: CARDIOLOGY | Facility: CLINIC | Age: 80
End: 2025-08-27
Payer: MEDICARE

## 2025-08-27 PROCEDURE — 93306 TTE W/DOPPLER COMPLETE: CPT

## 2025-09-02 ENCOUNTER — APPOINTMENT (OUTPATIENT)
Dept: ELECTROPHYSIOLOGY | Facility: CLINIC | Age: 80
End: 2025-09-02

## 2025-09-03 ENCOUNTER — APPOINTMENT (OUTPATIENT)
Dept: CARDIOLOGY | Facility: CLINIC | Age: 80
End: 2025-09-03

## 2025-09-04 RX ORDER — METOPROLOL SUCCINATE 50 MG/1
50 TABLET, EXTENDED RELEASE ORAL DAILY
Refills: 0 | Status: ACTIVE | COMMUNITY

## 2025-09-12 ENCOUNTER — APPOINTMENT (OUTPATIENT)
Dept: ELECTROPHYSIOLOGY | Facility: CLINIC | Age: 80
End: 2025-09-12

## 2025-09-12 VITALS — WEIGHT: 252 LBS | HEIGHT: 71 IN | BODY MASS INDEX: 35.28 KG/M2

## 2025-09-12 VITALS — DIASTOLIC BLOOD PRESSURE: 77 MMHG | HEART RATE: 80 BPM | SYSTOLIC BLOOD PRESSURE: 150 MMHG

## 2025-09-12 DIAGNOSIS — I10 ESSENTIAL (PRIMARY) HYPERTENSION: ICD-10-CM

## 2025-09-12 DIAGNOSIS — I48.91 UNSPECIFIED ATRIAL FIBRILLATION: ICD-10-CM

## 2025-09-12 PROCEDURE — 99214 OFFICE O/P EST MOD 30 MIN: CPT

## 2025-09-12 PROCEDURE — G2211 COMPLEX E/M VISIT ADD ON: CPT

## 2025-09-12 PROCEDURE — 93000 ELECTROCARDIOGRAM COMPLETE: CPT

## 2025-09-12 RX ORDER — VALSARTAN 320 MG/1
320 TABLET, COATED ORAL
Qty: 90 | Refills: 3 | Status: ACTIVE | COMMUNITY

## 2025-09-12 RX ORDER — RIVAROXABAN 20 MG/1
20 TABLET, FILM COATED ORAL
Qty: 30 | Refills: 6 | Status: ACTIVE | COMMUNITY